# Patient Record
Sex: MALE | Race: WHITE | Employment: OTHER | URBAN - NONMETROPOLITAN AREA
[De-identification: names, ages, dates, MRNs, and addresses within clinical notes are randomized per-mention and may not be internally consistent; named-entity substitution may affect disease eponyms.]

---

## 2021-08-27 ENCOUNTER — HOSPITAL ENCOUNTER (INPATIENT)
Age: 66
LOS: 5 days | Discharge: ANOTHER HOSPITAL | DRG: 253 | End: 2021-09-01
Attending: HOSPITALIST | Admitting: HOSPITALIST
Payer: OTHER GOVERNMENT

## 2021-08-27 ENCOUNTER — APPOINTMENT (OUTPATIENT)
Dept: GENERAL RADIOLOGY | Age: 66
DRG: 253 | End: 2021-08-27
Attending: HOSPITALIST
Payer: OTHER GOVERNMENT

## 2021-08-27 PROBLEM — I99.8 ISCHEMIA OF LEFT UPPER EXTREMITY: Status: ACTIVE | Noted: 2021-08-27

## 2021-08-27 LAB
ABO/RH: NORMAL
ALBUMIN SERPL-MCNC: 3.6 G/DL (ref 3.5–5.2)
ALP BLD-CCNC: 86 U/L (ref 40–130)
ALT SERPL-CCNC: 44 U/L (ref 5–41)
ANION GAP SERPL CALCULATED.3IONS-SCNC: 14 MMOL/L (ref 7–19)
ANTIBODY SCREEN: NORMAL
APTT: 22.7 SEC (ref 26–36.2)
AST SERPL-CCNC: 22 U/L (ref 5–40)
BASOPHILS ABSOLUTE: 0.1 K/UL (ref 0–0.2)
BASOPHILS RELATIVE PERCENT: 0.3 % (ref 0–1)
BILIRUB SERPL-MCNC: 0.7 MG/DL (ref 0.2–1.2)
BUN BLDV-MCNC: 41 MG/DL (ref 8–23)
CALCIUM SERPL-MCNC: 8.6 MG/DL (ref 8.8–10.2)
CHLORIDE BLD-SCNC: 99 MMOL/L (ref 98–111)
CO2: 22 MMOL/L (ref 22–29)
CREAT SERPL-MCNC: 1.4 MG/DL (ref 0.5–1.2)
D DIMER: 0.94 UG/ML FEU (ref 0–0.48)
EOSINOPHILS ABSOLUTE: 0 K/UL (ref 0–0.6)
EOSINOPHILS RELATIVE PERCENT: 0.2 % (ref 0–5)
GFR AFRICAN AMERICAN: >59
GFR NON-AFRICAN AMERICAN: 51
GLUCOSE BLD-MCNC: 147 MG/DL (ref 74–109)
HCT VFR BLD CALC: 51.1 % (ref 42–52)
HEMOGLOBIN: 16.8 G/DL (ref 14–18)
IMMATURE GRANULOCYTES #: 0.3 K/UL
INR BLD: 0.99 (ref 0.88–1.18)
LYMPHOCYTES ABSOLUTE: 1.2 K/UL (ref 1.1–4.5)
LYMPHOCYTES RELATIVE PERCENT: 5.7 % (ref 20–40)
MAGNESIUM: 2.1 MG/DL (ref 1.6–2.4)
MCH RBC QN AUTO: 29.7 PG (ref 27–31)
MCHC RBC AUTO-ENTMCNC: 32.9 G/DL (ref 33–37)
MCV RBC AUTO: 90.3 FL (ref 80–94)
MONOCYTES ABSOLUTE: 1.3 K/UL (ref 0–0.9)
MONOCYTES RELATIVE PERCENT: 6.3 % (ref 0–10)
NEUTROPHILS ABSOLUTE: 18.1 K/UL (ref 1.5–7.5)
NEUTROPHILS RELATIVE PERCENT: 86.1 % (ref 50–65)
OVALOCYTES: ABNORMAL
PDW BLD-RTO: 12.8 % (ref 11.5–14.5)
PHOSPHORUS: 6.5 MG/DL (ref 2.5–4.5)
PLATELET # BLD: 103 K/UL (ref 130–400)
PLATELET SLIDE REVIEW: ABNORMAL
PMV BLD AUTO: 10.5 FL (ref 9.4–12.4)
POTASSIUM SERPL-SCNC: 4.8 MMOL/L (ref 3.5–5)
PROTHROMBIN TIME: 13.3 SEC (ref 12–14.6)
RBC # BLD: 5.66 M/UL (ref 4.7–6.1)
REASON FOR REJECTION: NORMAL
REJECTED TEST: NORMAL
SODIUM BLD-SCNC: 135 MMOL/L (ref 136–145)
TOTAL PROTEIN: 6.3 G/DL (ref 6.6–8.7)
TROPONIN: <0.01 NG/ML (ref 0–0.03)
WBC # BLD: 21 K/UL (ref 4.8–10.8)

## 2021-08-27 PROCEDURE — 85730 THROMBOPLASTIN TIME PARTIAL: CPT

## 2021-08-27 PROCEDURE — 86900 BLOOD TYPING SEROLOGIC ABO: CPT

## 2021-08-27 PROCEDURE — 86850 RBC ANTIBODY SCREEN: CPT

## 2021-08-27 PROCEDURE — 93931 UPPER EXTREMITY STUDY: CPT

## 2021-08-27 PROCEDURE — 71045 X-RAY EXAM CHEST 1 VIEW: CPT

## 2021-08-27 PROCEDURE — 6360000002 HC RX W HCPCS: Performed by: HOSPITALIST

## 2021-08-27 PROCEDURE — 80053 COMPREHEN METABOLIC PANEL: CPT

## 2021-08-27 PROCEDURE — 85379 FIBRIN DEGRADATION QUANT: CPT

## 2021-08-27 PROCEDURE — 99221 1ST HOSP IP/OBS SF/LOW 40: CPT | Performed by: SURGERY

## 2021-08-27 PROCEDURE — 1210000000 HC MED SURG R&B

## 2021-08-27 PROCEDURE — 85610 PROTHROMBIN TIME: CPT

## 2021-08-27 PROCEDURE — 85025 COMPLETE CBC W/AUTO DIFF WBC: CPT

## 2021-08-27 PROCEDURE — 2580000003 HC RX 258: Performed by: HOSPITALIST

## 2021-08-27 PROCEDURE — 36415 COLL VENOUS BLD VENIPUNCTURE: CPT

## 2021-08-27 PROCEDURE — 83735 ASSAY OF MAGNESIUM: CPT

## 2021-08-27 PROCEDURE — 86901 BLOOD TYPING SEROLOGIC RH(D): CPT

## 2021-08-27 PROCEDURE — 84100 ASSAY OF PHOSPHORUS: CPT

## 2021-08-27 PROCEDURE — 84484 ASSAY OF TROPONIN QUANT: CPT

## 2021-08-27 RX ORDER — CALCIUM CARBONATE 200(500)MG
500 TABLET,CHEWABLE ORAL 3 TIMES DAILY PRN
Status: DISCONTINUED | OUTPATIENT
Start: 2021-08-27 | End: 2021-09-01 | Stop reason: HOSPADM

## 2021-08-27 RX ORDER — SODIUM CHLORIDE 0.9 % (FLUSH) 0.9 %
5-40 SYRINGE (ML) INJECTION PRN
Status: DISCONTINUED | OUTPATIENT
Start: 2021-08-27 | End: 2021-08-30 | Stop reason: SDUPTHER

## 2021-08-27 RX ORDER — POLYETHYLENE GLYCOL 3350 17 G/17G
17 POWDER, FOR SOLUTION ORAL DAILY PRN
Status: DISCONTINUED | OUTPATIENT
Start: 2021-08-27 | End: 2021-08-31

## 2021-08-27 RX ORDER — SODIUM CHLORIDE 9 MG/ML
25 INJECTION, SOLUTION INTRAVENOUS PRN
Status: DISCONTINUED | OUTPATIENT
Start: 2021-08-27 | End: 2021-08-30 | Stop reason: SDUPTHER

## 2021-08-27 RX ORDER — NAPROXEN 375 MG/1
375 TABLET ORAL 2 TIMES DAILY WITH MEALS
COMMUNITY

## 2021-08-27 RX ORDER — SODIUM CHLORIDE 0.9 % (FLUSH) 0.9 %
5-40 SYRINGE (ML) INJECTION EVERY 12 HOURS SCHEDULED
Status: DISCONTINUED | OUTPATIENT
Start: 2021-08-27 | End: 2021-08-30 | Stop reason: SDUPTHER

## 2021-08-27 RX ORDER — HEPARIN SODIUM 1000 [USP'U]/ML
4000 INJECTION, SOLUTION INTRAVENOUS; SUBCUTANEOUS PRN
Status: DISCONTINUED | OUTPATIENT
Start: 2021-08-27 | End: 2021-08-28

## 2021-08-27 RX ORDER — HEPARIN SODIUM 1000 [USP'U]/ML
2000 INJECTION, SOLUTION INTRAVENOUS; SUBCUTANEOUS PRN
Status: DISCONTINUED | OUTPATIENT
Start: 2021-08-27 | End: 2021-08-28

## 2021-08-27 RX ORDER — OMEPRAZOLE 20 MG/1
20 CAPSULE, DELAYED RELEASE ORAL DAILY
COMMUNITY

## 2021-08-27 RX ORDER — HYDROCHLOROTHIAZIDE 25 MG/1
25 TABLET ORAL DAILY
COMMUNITY

## 2021-08-27 RX ORDER — LISINOPRIL 20 MG/1
20 TABLET ORAL DAILY
COMMUNITY

## 2021-08-27 RX ORDER — MECOBALAMIN 5000 MCG
5 TABLET,DISINTEGRATING ORAL NIGHTLY PRN
Status: DISCONTINUED | OUTPATIENT
Start: 2021-08-27 | End: 2021-09-01 | Stop reason: HOSPADM

## 2021-08-27 RX ORDER — MIRTAZAPINE 15 MG/1
45 TABLET, FILM COATED ORAL NIGHTLY
COMMUNITY

## 2021-08-27 RX ORDER — NALOXONE HYDROCHLORIDE 0.4 MG/ML
0.4 INJECTION, SOLUTION INTRAMUSCULAR; INTRAVENOUS; SUBCUTANEOUS PRN
Status: DISCONTINUED | OUTPATIENT
Start: 2021-08-27 | End: 2021-09-01 | Stop reason: HOSPADM

## 2021-08-27 RX ORDER — ONDANSETRON 2 MG/ML
4 INJECTION INTRAMUSCULAR; INTRAVENOUS EVERY 6 HOURS PRN
Status: DISCONTINUED | OUTPATIENT
Start: 2021-08-27 | End: 2021-09-01 | Stop reason: HOSPADM

## 2021-08-27 RX ORDER — ONDANSETRON 4 MG/1
4 TABLET, ORALLY DISINTEGRATING ORAL EVERY 8 HOURS PRN
Status: DISCONTINUED | OUTPATIENT
Start: 2021-08-27 | End: 2021-09-01 | Stop reason: HOSPADM

## 2021-08-27 RX ORDER — HEPARIN SODIUM 10000 [USP'U]/100ML
5-30 INJECTION, SOLUTION INTRAVENOUS CONTINUOUS
Status: DISCONTINUED | OUTPATIENT
Start: 2021-08-27 | End: 2021-08-28

## 2021-08-27 RX ADMIN — SODIUM CHLORIDE, PRESERVATIVE FREE 10 ML: 5 INJECTION INTRAVENOUS at 21:56

## 2021-08-27 RX ADMIN — HEPARIN SODIUM 4000 UNITS: 1000 INJECTION INTRAVENOUS; SUBCUTANEOUS at 21:54

## 2021-08-27 RX ADMIN — HEPARIN SODIUM 9.77 UNITS/KG/HR: 10000 INJECTION, SOLUTION INTRAVENOUS at 21:53

## 2021-08-27 ASSESSMENT — PAIN SCALES - GENERAL: PAINLEVEL_OUTOF10: 0

## 2021-08-28 ENCOUNTER — ANESTHESIA (OUTPATIENT)
Dept: OPERATING ROOM | Age: 66
DRG: 253 | End: 2021-08-28
Payer: OTHER GOVERNMENT

## 2021-08-28 ENCOUNTER — APPOINTMENT (OUTPATIENT)
Dept: INTERVENTIONAL RADIOLOGY/VASCULAR | Age: 66
DRG: 253 | End: 2021-08-28
Attending: HOSPITALIST
Payer: OTHER GOVERNMENT

## 2021-08-28 ENCOUNTER — ANESTHESIA EVENT (OUTPATIENT)
Dept: OPERATING ROOM | Age: 66
DRG: 253 | End: 2021-08-28
Payer: OTHER GOVERNMENT

## 2021-08-28 VITALS — SYSTOLIC BLOOD PRESSURE: 128 MMHG | DIASTOLIC BLOOD PRESSURE: 64 MMHG | TEMPERATURE: 98.4 F | OXYGEN SATURATION: 97 %

## 2021-08-28 PROBLEM — I82.602: Status: ACTIVE | Noted: 2021-08-28

## 2021-08-28 PROBLEM — I74.2 ARTERIAL EMBOLISM AND THROMBOSIS OF UPPER EXTREMITY (HCC): Status: ACTIVE | Noted: 2021-08-28

## 2021-08-28 LAB
ANION GAP SERPL CALCULATED.3IONS-SCNC: 13 MMOL/L (ref 7–19)
APTT: 41.5 SEC (ref 26–36.2)
APTT: 50.2 SEC (ref 26–36.2)
APTT: 50.5 SEC (ref 26–36.2)
APTT: 51.7 SEC (ref 26–36.2)
APTT: 54.9 SEC (ref 26–36.2)
BASOPHILS ABSOLUTE: 0.1 K/UL (ref 0–0.2)
BASOPHILS ABSOLUTE: 0.1 K/UL (ref 0–0.2)
BASOPHILS RELATIVE PERCENT: 0.3 % (ref 0–1)
BASOPHILS RELATIVE PERCENT: 0.3 % (ref 0–1)
BUN BLDV-MCNC: 43 MG/DL (ref 8–23)
CALCIUM SERPL-MCNC: 8.6 MG/DL (ref 8.8–10.2)
CHLORIDE BLD-SCNC: 100 MMOL/L (ref 98–111)
CO2: 20 MMOL/L (ref 22–29)
CREAT SERPL-MCNC: 1.4 MG/DL (ref 0.5–1.2)
EOSINOPHILS ABSOLUTE: 0 K/UL (ref 0–0.6)
EOSINOPHILS ABSOLUTE: 0 K/UL (ref 0–0.6)
EOSINOPHILS RELATIVE PERCENT: 0.1 % (ref 0–5)
EOSINOPHILS RELATIVE PERCENT: 0.2 % (ref 0–5)
GFR AFRICAN AMERICAN: >59
GFR NON-AFRICAN AMERICAN: 51
GLUCOSE BLD-MCNC: 222 MG/DL (ref 74–109)
HCT VFR BLD CALC: 47.6 % (ref 42–52)
HCT VFR BLD CALC: 50.8 % (ref 42–52)
HEMOGLOBIN: 15.5 G/DL (ref 14–18)
HEMOGLOBIN: 16.5 G/DL (ref 14–18)
IMMATURE GRANULOCYTES #: 0.3 K/UL
IMMATURE GRANULOCYTES #: 0.3 K/UL
LV EF: 58 %
LVEF MODALITY: NORMAL
LYMPHOCYTES ABSOLUTE: 1 K/UL (ref 1.1–4.5)
LYMPHOCYTES ABSOLUTE: 1.5 K/UL (ref 1.1–4.5)
LYMPHOCYTES RELATIVE PERCENT: 5.7 % (ref 20–40)
LYMPHOCYTES RELATIVE PERCENT: 7.3 % (ref 20–40)
MCH RBC QN AUTO: 29.5 PG (ref 27–31)
MCH RBC QN AUTO: 29.8 PG (ref 27–31)
MCHC RBC AUTO-ENTMCNC: 32.5 G/DL (ref 33–37)
MCHC RBC AUTO-ENTMCNC: 32.6 G/DL (ref 33–37)
MCV RBC AUTO: 90.7 FL (ref 80–94)
MCV RBC AUTO: 91.5 FL (ref 80–94)
MONOCYTES ABSOLUTE: 0.4 K/UL (ref 0–0.9)
MONOCYTES ABSOLUTE: 1.5 K/UL (ref 0–0.9)
MONOCYTES RELATIVE PERCENT: 2.3 % (ref 0–10)
MONOCYTES RELATIVE PERCENT: 7.1 % (ref 0–10)
NEUTROPHILS ABSOLUTE: 16.4 K/UL (ref 1.5–7.5)
NEUTROPHILS ABSOLUTE: 17.1 K/UL (ref 1.5–7.5)
NEUTROPHILS RELATIVE PERCENT: 83.7 % (ref 50–65)
NEUTROPHILS RELATIVE PERCENT: 89.8 % (ref 50–65)
PDW BLD-RTO: 12.8 % (ref 11.5–14.5)
PDW BLD-RTO: 13 % (ref 11.5–14.5)
PLATELET # BLD: 108 K/UL (ref 130–400)
PLATELET # BLD: 97 K/UL (ref 130–400)
PMV BLD AUTO: 10.8 FL (ref 9.4–12.4)
PMV BLD AUTO: 11.1 FL (ref 9.4–12.4)
POTASSIUM REFLEX MAGNESIUM: 4.7 MMOL/L (ref 3.5–5)
RBC # BLD: 5.2 M/UL (ref 4.7–6.1)
RBC # BLD: 5.6 M/UL (ref 4.7–6.1)
SODIUM BLD-SCNC: 133 MMOL/L (ref 136–145)
WBC # BLD: 18.3 K/UL (ref 4.8–10.8)
WBC # BLD: 20.4 K/UL (ref 4.8–10.8)

## 2021-08-28 PROCEDURE — 36415 COLL VENOUS BLD VENIPUNCTURE: CPT

## 2021-08-28 PROCEDURE — 2580000003 HC RX 258: Performed by: SURGERY

## 2021-08-28 PROCEDURE — 3600000017 HC SURGERY HYBRID ADDL 15MIN: Performed by: SURGERY

## 2021-08-28 PROCEDURE — 6360000002 HC RX W HCPCS: Performed by: SURGERY

## 2021-08-28 PROCEDURE — 7100000001 HC PACU RECOVERY - ADDTL 15 MIN: Performed by: SURGERY

## 2021-08-28 PROCEDURE — 99221 1ST HOSP IP/OBS SF/LOW 40: CPT | Performed by: INTERNAL MEDICINE

## 2021-08-28 PROCEDURE — 03CA0ZZ EXTIRPATION OF MATTER FROM LEFT ULNAR ARTERY, OPEN APPROACH: ICD-10-PCS | Performed by: SURGERY

## 2021-08-28 PROCEDURE — 7100000000 HC PACU RECOVERY - FIRST 15 MIN: Performed by: SURGERY

## 2021-08-28 PROCEDURE — 34101 REMOVAL OF ARTERY CLOT: CPT | Performed by: SURGERY

## 2021-08-28 PROCEDURE — 80048 BASIC METABOLIC PNL TOTAL CA: CPT

## 2021-08-28 PROCEDURE — 88304 TISSUE EXAM BY PATHOLOGIST: CPT

## 2021-08-28 PROCEDURE — 03CC0ZZ EXTIRPATION OF MATTER FROM LEFT RADIAL ARTERY, OPEN APPROACH: ICD-10-PCS | Performed by: SURGERY

## 2021-08-28 PROCEDURE — 2700000000 HC OXYGEN THERAPY PER DAY

## 2021-08-28 PROCEDURE — 34111 REMOVAL OF ARM ARTERY CLOT: CPT | Performed by: SURGERY

## 2021-08-28 PROCEDURE — 6360000002 HC RX W HCPCS

## 2021-08-28 PROCEDURE — 2580000003 HC RX 258

## 2021-08-28 PROCEDURE — 3700000001 HC ADD 15 MINUTES (ANESTHESIA): Performed by: SURGERY

## 2021-08-28 PROCEDURE — 03C80ZZ EXTIRPATION OF MATTER FROM LEFT BRACHIAL ARTERY, OPEN APPROACH: ICD-10-PCS | Performed by: SURGERY

## 2021-08-28 PROCEDURE — 99223 1ST HOSP IP/OBS HIGH 75: CPT | Performed by: INTERNAL MEDICINE

## 2021-08-28 PROCEDURE — 6370000000 HC RX 637 (ALT 250 FOR IP): Performed by: SURGERY

## 2021-08-28 PROCEDURE — 2500000003 HC RX 250 WO HCPCS

## 2021-08-28 PROCEDURE — C1757 CATH, THROMBECTOMY/EMBOLECT: HCPCS | Performed by: SURGERY

## 2021-08-28 PROCEDURE — 85025 COMPLETE CBC W/AUTO DIFF WBC: CPT

## 2021-08-28 PROCEDURE — 2709999900 HC NON-CHARGEABLE SUPPLY: Performed by: SURGERY

## 2021-08-28 PROCEDURE — 3600000007 HC SURGERY HYBRID BASE: Performed by: SURGERY

## 2021-08-28 PROCEDURE — 85730 THROMBOPLASTIN TIME PARTIAL: CPT

## 2021-08-28 PROCEDURE — C8929 TTE W OR WO FOL WCON,DOPPLER: HCPCS

## 2021-08-28 PROCEDURE — 3700000000 HC ANESTHESIA ATTENDED CARE: Performed by: SURGERY

## 2021-08-28 PROCEDURE — 1210000000 HC MED SURG R&B

## 2021-08-28 PROCEDURE — C1769 GUIDE WIRE: HCPCS | Performed by: SURGERY

## 2021-08-28 RX ORDER — DEXAMETHASONE SODIUM PHOSPHATE 10 MG/ML
INJECTION, SOLUTION INTRAMUSCULAR; INTRAVENOUS PRN
Status: DISCONTINUED | OUTPATIENT
Start: 2021-08-28 | End: 2021-08-28 | Stop reason: SDUPTHER

## 2021-08-28 RX ORDER — SODIUM CHLORIDE 9 MG/ML
25 INJECTION, SOLUTION INTRAVENOUS PRN
Status: DISCONTINUED | OUTPATIENT
Start: 2021-08-28 | End: 2021-08-30 | Stop reason: SDUPTHER

## 2021-08-28 RX ORDER — SUCCINYLCHOLINE CHLORIDE 20 MG/ML
INJECTION INTRAMUSCULAR; INTRAVENOUS PRN
Status: DISCONTINUED | OUTPATIENT
Start: 2021-08-28 | End: 2021-08-28 | Stop reason: SDUPTHER

## 2021-08-28 RX ORDER — MORPHINE SULFATE 4 MG/ML
4 INJECTION, SOLUTION INTRAMUSCULAR; INTRAVENOUS EVERY 5 MIN PRN
Status: DISCONTINUED | OUTPATIENT
Start: 2021-08-28 | End: 2021-08-28 | Stop reason: HOSPADM

## 2021-08-28 RX ORDER — PROMETHAZINE HYDROCHLORIDE 25 MG/ML
6.25 INJECTION, SOLUTION INTRAMUSCULAR; INTRAVENOUS
Status: DISCONTINUED | OUTPATIENT
Start: 2021-08-28 | End: 2021-08-28 | Stop reason: HOSPADM

## 2021-08-28 RX ORDER — HYDROMORPHONE HYDROCHLORIDE 1 MG/ML
0.25 INJECTION, SOLUTION INTRAMUSCULAR; INTRAVENOUS; SUBCUTANEOUS EVERY 5 MIN PRN
Status: DISCONTINUED | OUTPATIENT
Start: 2021-08-28 | End: 2021-08-28 | Stop reason: HOSPADM

## 2021-08-28 RX ORDER — ROCURONIUM BROMIDE 10 MG/ML
INJECTION, SOLUTION INTRAVENOUS PRN
Status: DISCONTINUED | OUTPATIENT
Start: 2021-08-28 | End: 2021-08-28 | Stop reason: SDUPTHER

## 2021-08-28 RX ORDER — PROPOFOL 10 MG/ML
INJECTION, EMULSION INTRAVENOUS PRN
Status: DISCONTINUED | OUTPATIENT
Start: 2021-08-28 | End: 2021-08-28 | Stop reason: SDUPTHER

## 2021-08-28 RX ORDER — LISINOPRIL 20 MG/1
20 TABLET ORAL DAILY
Status: DISCONTINUED | OUTPATIENT
Start: 2021-08-28 | End: 2021-09-01 | Stop reason: HOSPADM

## 2021-08-28 RX ORDER — MIDAZOLAM HYDROCHLORIDE 1 MG/ML
INJECTION INTRAMUSCULAR; INTRAVENOUS PRN
Status: DISCONTINUED | OUTPATIENT
Start: 2021-08-28 | End: 2021-08-28 | Stop reason: SDUPTHER

## 2021-08-28 RX ORDER — MIRTAZAPINE 15 MG/1
45 TABLET, FILM COATED ORAL NIGHTLY
Status: DISCONTINUED | OUTPATIENT
Start: 2021-08-28 | End: 2021-08-31

## 2021-08-28 RX ORDER — LIDOCAINE HYDROCHLORIDE 10 MG/ML
INJECTION, SOLUTION EPIDURAL; INFILTRATION; INTRACAUDAL; PERINEURAL PRN
Status: DISCONTINUED | OUTPATIENT
Start: 2021-08-28 | End: 2021-08-28 | Stop reason: SDUPTHER

## 2021-08-28 RX ORDER — HYDROCODONE BITARTRATE AND ACETAMINOPHEN 7.5; 325 MG/1; MG/1
1 TABLET ORAL EVERY 4 HOURS PRN
Status: DISCONTINUED | OUTPATIENT
Start: 2021-08-28 | End: 2021-09-01 | Stop reason: HOSPADM

## 2021-08-28 RX ORDER — ONDANSETRON 2 MG/ML
INJECTION INTRAMUSCULAR; INTRAVENOUS PRN
Status: DISCONTINUED | OUTPATIENT
Start: 2021-08-28 | End: 2021-08-28 | Stop reason: SDUPTHER

## 2021-08-28 RX ORDER — HYDROMORPHONE HYDROCHLORIDE 1 MG/ML
0.5 INJECTION, SOLUTION INTRAMUSCULAR; INTRAVENOUS; SUBCUTANEOUS EVERY 5 MIN PRN
Status: DISCONTINUED | OUTPATIENT
Start: 2021-08-28 | End: 2021-08-28 | Stop reason: HOSPADM

## 2021-08-28 RX ORDER — SODIUM CHLORIDE 0.9 % (FLUSH) 0.9 %
5-40 SYRINGE (ML) INJECTION EVERY 12 HOURS SCHEDULED
Status: DISCONTINUED | OUTPATIENT
Start: 2021-08-28 | End: 2021-08-30 | Stop reason: SDUPTHER

## 2021-08-28 RX ORDER — PANTOPRAZOLE SODIUM 40 MG/1
40 TABLET, DELAYED RELEASE ORAL
Status: DISCONTINUED | OUTPATIENT
Start: 2021-08-28 | End: 2021-09-01 | Stop reason: HOSPADM

## 2021-08-28 RX ORDER — LABETALOL HYDROCHLORIDE 5 MG/ML
5 INJECTION, SOLUTION INTRAVENOUS EVERY 10 MIN PRN
Status: DISCONTINUED | OUTPATIENT
Start: 2021-08-28 | End: 2021-08-28 | Stop reason: HOSPADM

## 2021-08-28 RX ORDER — EPHEDRINE SULFATE 50 MG/ML
INJECTION, SOLUTION INTRAVENOUS PRN
Status: DISCONTINUED | OUTPATIENT
Start: 2021-08-28 | End: 2021-08-28 | Stop reason: SDUPTHER

## 2021-08-28 RX ORDER — HYDROCHLOROTHIAZIDE 25 MG/1
25 TABLET ORAL DAILY
Status: DISCONTINUED | OUTPATIENT
Start: 2021-08-28 | End: 2021-09-01

## 2021-08-28 RX ORDER — HYDROMORPHONE HYDROCHLORIDE 1 MG/ML
0.5 INJECTION, SOLUTION INTRAMUSCULAR; INTRAVENOUS; SUBCUTANEOUS EVERY 4 HOURS PRN
Status: DISCONTINUED | OUTPATIENT
Start: 2021-08-28 | End: 2021-08-29

## 2021-08-28 RX ORDER — ENALAPRILAT 2.5 MG/2ML
1.25 INJECTION INTRAVENOUS
Status: DISCONTINUED | OUTPATIENT
Start: 2021-08-28 | End: 2021-08-28 | Stop reason: HOSPADM

## 2021-08-28 RX ORDER — CEFAZOLIN SODIUM 1 G/3ML
INJECTION, POWDER, FOR SOLUTION INTRAMUSCULAR; INTRAVENOUS PRN
Status: DISCONTINUED | OUTPATIENT
Start: 2021-08-28 | End: 2021-08-28 | Stop reason: SDUPTHER

## 2021-08-28 RX ORDER — FENTANYL CITRATE 50 UG/ML
INJECTION, SOLUTION INTRAMUSCULAR; INTRAVENOUS PRN
Status: DISCONTINUED | OUTPATIENT
Start: 2021-08-28 | End: 2021-08-28 | Stop reason: SDUPTHER

## 2021-08-28 RX ORDER — MEPERIDINE HYDROCHLORIDE 25 MG/ML
12.5 INJECTION INTRAMUSCULAR; INTRAVENOUS; SUBCUTANEOUS EVERY 5 MIN PRN
Status: DISCONTINUED | OUTPATIENT
Start: 2021-08-28 | End: 2021-08-28 | Stop reason: HOSPADM

## 2021-08-28 RX ORDER — CALCIUM CHLORIDE 100 MG/ML
INJECTION INTRAVENOUS; INTRAVENTRICULAR PRN
Status: DISCONTINUED | OUTPATIENT
Start: 2021-08-28 | End: 2021-08-28 | Stop reason: SDUPTHER

## 2021-08-28 RX ORDER — METOCLOPRAMIDE HYDROCHLORIDE 5 MG/ML
10 INJECTION INTRAMUSCULAR; INTRAVENOUS
Status: DISCONTINUED | OUTPATIENT
Start: 2021-08-28 | End: 2021-08-28 | Stop reason: HOSPADM

## 2021-08-28 RX ORDER — HYDRALAZINE HYDROCHLORIDE 20 MG/ML
5 INJECTION INTRAMUSCULAR; INTRAVENOUS EVERY 10 MIN PRN
Status: DISCONTINUED | OUTPATIENT
Start: 2021-08-28 | End: 2021-08-28 | Stop reason: HOSPADM

## 2021-08-28 RX ORDER — SODIUM CHLORIDE 9 MG/ML
INJECTION, SOLUTION INTRAVENOUS CONTINUOUS
Status: DISCONTINUED | OUTPATIENT
Start: 2021-08-28 | End: 2021-08-30 | Stop reason: ALTCHOICE

## 2021-08-28 RX ORDER — SODIUM CHLORIDE 0.9 % (FLUSH) 0.9 %
5-40 SYRINGE (ML) INJECTION PRN
Status: DISCONTINUED | OUTPATIENT
Start: 2021-08-28 | End: 2021-08-30 | Stop reason: SDUPTHER

## 2021-08-28 RX ORDER — MORPHINE SULFATE 4 MG/ML
2 INJECTION, SOLUTION INTRAMUSCULAR; INTRAVENOUS EVERY 5 MIN PRN
Status: DISCONTINUED | OUTPATIENT
Start: 2021-08-28 | End: 2021-08-28 | Stop reason: HOSPADM

## 2021-08-28 RX ORDER — SODIUM CHLORIDE, SODIUM LACTATE, POTASSIUM CHLORIDE, CALCIUM CHLORIDE 600; 310; 30; 20 MG/100ML; MG/100ML; MG/100ML; MG/100ML
INJECTION, SOLUTION INTRAVENOUS CONTINUOUS PRN
Status: DISCONTINUED | OUTPATIENT
Start: 2021-08-28 | End: 2021-08-28 | Stop reason: SDUPTHER

## 2021-08-28 RX ORDER — DIPHENHYDRAMINE HYDROCHLORIDE 50 MG/ML
12.5 INJECTION INTRAMUSCULAR; INTRAVENOUS
Status: DISCONTINUED | OUTPATIENT
Start: 2021-08-28 | End: 2021-08-28 | Stop reason: HOSPADM

## 2021-08-28 RX ORDER — HEPARIN SODIUM 1000 [USP'U]/ML
INJECTION, SOLUTION INTRAVENOUS; SUBCUTANEOUS PRN
Status: DISCONTINUED | OUTPATIENT
Start: 2021-08-28 | End: 2021-08-28 | Stop reason: SDUPTHER

## 2021-08-28 RX ADMIN — FENTANYL CITRATE 100 MCG: 50 INJECTION, SOLUTION INTRAMUSCULAR; INTRAVENOUS at 00:43

## 2021-08-28 RX ADMIN — CALCIUM CHLORIDE 0.5 G: 100 INJECTION, SOLUTION INTRAVENOUS; INTRAVENTRICULAR at 01:25

## 2021-08-28 RX ADMIN — ARGATROBAN 2 MCG/KG/MIN: 100 INJECTION, SOLUTION INTRAVENOUS at 08:12

## 2021-08-28 RX ADMIN — CEFAZOLIN SODIUM 2000 MG: 1 INJECTION, POWDER, FOR SOLUTION INTRAMUSCULAR; INTRAVENOUS at 17:04

## 2021-08-28 RX ADMIN — EPHEDRINE SULFATE 15 MG: 50 INJECTION INTRAMUSCULAR; INTRAVENOUS; SUBCUTANEOUS at 01:04

## 2021-08-28 RX ADMIN — MIDAZOLAM 2 MG: 1 INJECTION INTRAMUSCULAR; INTRAVENOUS at 00:42

## 2021-08-28 RX ADMIN — SODIUM CHLORIDE, PRESERVATIVE FREE 10 ML: 5 INJECTION INTRAVENOUS at 08:23

## 2021-08-28 RX ADMIN — SUGAMMADEX 400 MG: 100 INJECTION, SOLUTION INTRAVENOUS at 04:09

## 2021-08-28 RX ADMIN — ROCURONIUM BROMIDE 30 MG: 10 INJECTION, SOLUTION INTRAVENOUS at 02:11

## 2021-08-28 RX ADMIN — SODIUM CHLORIDE: 9 INJECTION, SOLUTION INTRAVENOUS at 06:32

## 2021-08-28 RX ADMIN — HEPARIN SODIUM 5000 UNITS: 1000 INJECTION, SOLUTION INTRAVENOUS; SUBCUTANEOUS at 01:38

## 2021-08-28 RX ADMIN — LIDOCAINE HYDROCHLORIDE 50 MG: 10 INJECTION, SOLUTION EPIDURAL; INFILTRATION; INTRACAUDAL; PERINEURAL at 00:47

## 2021-08-28 RX ADMIN — ONDANSETRON HYDROCHLORIDE 4 MG: 2 INJECTION, SOLUTION INTRAMUSCULAR; INTRAVENOUS at 04:04

## 2021-08-28 RX ADMIN — ROCURONIUM BROMIDE 50 MG: 10 INJECTION, SOLUTION INTRAVENOUS at 01:02

## 2021-08-28 RX ADMIN — EPHEDRINE SULFATE 20 MG: 50 INJECTION INTRAMUSCULAR; INTRAVENOUS; SUBCUTANEOUS at 03:46

## 2021-08-28 RX ADMIN — CEFAZOLIN SODIUM 2000 MG: 1 INJECTION, POWDER, FOR SOLUTION INTRAMUSCULAR; INTRAVENOUS at 01:00

## 2021-08-28 RX ADMIN — HYDROCHLOROTHIAZIDE 25 MG: 25 TABLET ORAL at 08:12

## 2021-08-28 RX ADMIN — DEXAMETHASONE SODIUM PHOSPHATE 10 MG: 10 INJECTION, SOLUTION INTRAMUSCULAR; INTRAVENOUS at 01:02

## 2021-08-28 RX ADMIN — CEFAZOLIN SODIUM 2000 MG: 1 INJECTION, POWDER, FOR SOLUTION INTRAMUSCULAR; INTRAVENOUS at 08:11

## 2021-08-28 RX ADMIN — PANTOPRAZOLE SODIUM 40 MG: 40 TABLET, DELAYED RELEASE ORAL at 06:32

## 2021-08-28 RX ADMIN — PROPOFOL 160 MG: 10 INJECTION, EMULSION INTRAVENOUS at 00:47

## 2021-08-28 RX ADMIN — HEPARIN SODIUM 1000 UNITS: 1000 INJECTION, SOLUTION INTRAVENOUS; SUBCUTANEOUS at 02:49

## 2021-08-28 RX ADMIN — SODIUM CHLORIDE, SODIUM LACTATE, POTASSIUM CHLORIDE, AND CALCIUM CHLORIDE: 600; 310; 30; 20 INJECTION, SOLUTION INTRAVENOUS at 00:42

## 2021-08-28 RX ADMIN — MIRTAZAPINE 45 MG: 15 TABLET, FILM COATED ORAL at 21:14

## 2021-08-28 RX ADMIN — FENTANYL CITRATE 50 MCG: 50 INJECTION, SOLUTION INTRAMUSCULAR; INTRAVENOUS at 04:14

## 2021-08-28 RX ADMIN — CALCIUM CHLORIDE 0.5 G: 100 INJECTION, SOLUTION INTRAVENOUS; INTRAVENTRICULAR at 00:55

## 2021-08-28 RX ADMIN — SUCCINYLCHOLINE CHLORIDE 140 MG: 20 INJECTION, SOLUTION INTRAMUSCULAR; INTRAVENOUS at 00:47

## 2021-08-28 RX ADMIN — SODIUM CHLORIDE, PRESERVATIVE FREE 10 ML: 5 INJECTION INTRAVENOUS at 08:24

## 2021-08-28 RX ADMIN — ROCURONIUM BROMIDE 20 MG: 10 INJECTION, SOLUTION INTRAVENOUS at 02:51

## 2021-08-28 RX ADMIN — Medication 5 MG: at 21:14

## 2021-08-28 RX ADMIN — LISINOPRIL 20 MG: 20 TABLET ORAL at 08:12

## 2021-08-28 RX ADMIN — FENTANYL CITRATE 100 MCG: 50 INJECTION, SOLUTION INTRAMUSCULAR; INTRAVENOUS at 04:32

## 2021-08-28 RX ADMIN — SODIUM CHLORIDE, SODIUM LACTATE, POTASSIUM CHLORIDE, AND CALCIUM CHLORIDE: 600; 310; 30; 20 INJECTION, SOLUTION INTRAVENOUS at 02:05

## 2021-08-28 ASSESSMENT — ENCOUNTER SYMPTOMS
ABDOMINAL PAIN: 0
RESPIRATORY NEGATIVE: 1
VOMITING: 0
CONSTIPATION: 0
EYES NEGATIVE: 1
DIARRHEA: 1
NAUSEA: 1
COUGH: 0
ALLERGIC/IMMUNOLOGIC NEGATIVE: 1
GASTROINTESTINAL NEGATIVE: 1

## 2021-08-28 ASSESSMENT — PAIN SCALES - GENERAL
PAINLEVEL_OUTOF10: 0
PAINLEVEL_OUTOF10: 2

## 2021-08-28 NOTE — ANESTHESIA PRE PROCEDURE
Department of Anesthesiology  Preprocedure Note       Name:  Concetta Goldstein   Age:  61 y.o.  :  1955                                          MRN:  [id number]         Date:  2021      Surgeon: Mesha Olivares):  Herminia Espinoza DO    Procedure: Procedure(s):  WOUND EXPLORATION AND/OR REVISION    Medications prior to admission:   Prior to Admission medications    Medication Sig Start Date End Date Taking?  Authorizing Provider   lisinopril (PRINIVIL;ZESTRIL) 20 MG tablet Take 20 mg by mouth daily   Yes Historical Provider, MD   mirtazapine (REMERON) 15 MG tablet Take 45 mg by mouth nightly   Yes Historical Provider, MD   naproxen (NAPROSYN) 375 MG tablet Take 375 mg by mouth 2 times daily (with meals)   Yes Historical Provider, MD   omeprazole (PRILOSEC) 20 MG delayed release capsule Take 20 mg by mouth daily   Yes Historical Provider, MD   hydroCHLOROthiazide (HYDRODIURIL) 25 MG tablet Take 25 mg by mouth daily   Yes Historical Provider, MD       Current medications:    Current Facility-Administered Medications   Medication Dose Route Frequency Provider Last Rate Last Admin    meperidine (DEMEROL) injection 12.5 mg  12.5 mg IntraVENous Q5 Min PRN Nell Danas, APRN - CRNA        HYDROmorphone HCl PF (DILAUDID) injection 0.25 mg  0.25 mg IntraVENous Q5 Min PRN Nell Danas, APRN - CRNA        HYDROmorphone HCl PF (DILAUDID) injection 0.5 mg  0.5 mg IntraVENous Q5 Min PRN Nell Danas, APRN - CRNA        morphine injection 2 mg  2 mg IntraVENous Q5 Min PRN Nell Danas, APRN - CRNA        morphine injection 4 mg  4 mg IntraVENous Q5 Min PRN Nell Danas, APRN - CRNA        promethazine (PHENERGAN) injection 6.25 mg  6.25 mg IntraVENous Once PRN Nell Danas, APRN - CRNA        metoclopramide (REGLAN) injection 10 mg  10 mg IntraVENous Once PRN Nell Danas, APRN - CRNA        diphenhydrAMINE (BENADRYL) injection 12.5 mg  12.5 mg IntraVENous Once PRN Nell Danas, APRN - CRNA        labetalol (NORMODYNE;TRANDATE) injection 5 mg  5 mg IntraVENous Q10 Min PRN Yogesh Govern, APRN - CRNA        hydrALAZINE (APRESOLINE) injection 5 mg  5 mg IntraVENous Q10 Min PRN Yogesh Govern, APRN - CRNA        enalaprilat (VASOTEC) injection 1.25 mg  1.25 mg IntraVENous Once PRN Yogesh Govern, APRN - CRNA        heparin (porcine) injection 4,000 Units  4,000 Units IntraVENous PRN Ofelia Mackey MD   4,000 Units at 08/27/21 2154    heparin (porcine) injection 2,000 Units  2,000 Units IntraVENous PRN Ofelia Mackey MD        heparin 25,000 units in dextrose 5% 250 mL (premix) infusion  5-30 Units/kg/hr IntraVENous Continuous Ofelia Mackey MD 10 mL/hr at 08/27/21 2153 9.77 Units/kg/hr at 08/27/21 2153    calcium carbonate (TUMS) chewable tablet 500 mg  500 mg Oral TID PRN Ofelia Mackey MD        polyethylene glycol [facility]) packet 17 g  17 g Oral Daily PRN Ofelia Mackey MD        melatonin disintegrating tablet 5 mg  5 mg Oral Nightly PRN Ofelia Mackey MD        [facility]) injection 0.4 mg  0.4 mg IntraVENous PRN Ofelia Mackey MD        sodium chloride flush 0.9 % injection 5-40 mL  5-40 mL IntraVENous 2 times per day Ofelia Mackey MD   10 mL at 08/27/21 2156    sodium chloride flush 0.9 % injection 5-40 mL  5-40 mL IntraVENous PRN Ofelia Mackey MD        0.9 % sodium chloride infusion  25 mL IntraVENous PRN Ofelia Mackey MD        ondansetron (ZOFRAN-ODT) disintegrating tablet 4 mg  4 mg Oral Q8H PRN Ofelia Mackey MD        Or    ondansetron [facility]) injection 4 mg  4 mg IntraVENous Q6H PRN Ofelia Mackey MD           Allergies:  No Known Allergies    Problem List:    Patient Active Problem List   Diagnosis Code    Hypertension I10    Ischemia of left upper extremity I99.8    GERD (gastroesophageal reflux disease), as per medication reconciliation K21.9    Black lung ([address] J60    History of tobacco abuse Z87.891       Past Medical History:        Diagnosis Date    [facility] [address] ([facility] [address]     GERD (gastroesophageal reflux disease), as per medication reconciliation     History of tobacco abuse     Hypertension        Past Surgical History:        Procedure Laterality Date    NECK SURGERY Left     \"years ago\" a per report on        Social History:    Social History     Tobacco Use    Smoking status: Former Smoker     Packs/day: 1.00     Years: 12.00     Pack years: 12.00     Types: Cigarettes     Quit date:      Years since quittin.6    Smokeless tobacco: Never Used    Tobacco comment: smoked from age [age], smoked at 1 [facility], tried 2 cigars only in his life   Substance Use Topics    Alcohol use: Yes     Comment: has about a 12 pack per year                                Counseling given: Not Answered  Comment: smoked from age [age], smoked at 1 [facility], tried 2 cigars only in his life      Vital Signs (Current):   Vitals:    21 2112 21 2353   BP: 139/87 122/79   Pulse: 89 65   Resp: 16 16   Temp: 96.8 °F (36 °C) 96.9 °F (36.1 °C)   TempSrc: Temporal Temporal   SpO2: 96% 99%   Weight: 225 lb 12.8 oz (102.4 kg)    Height: 5' 8\" (1.727 m)                                               BP Readings from Last 3 Encounters:   21 122/79       NPO Status:                                                                                 BMI:   Wt Readings from Last 3 Encounters:   21 225 lb 12.8 oz (102.4 kg)     Body mass index is 34.33 kg/m².     CBC:   Lab Results   Component Value Date    WBC 21.0 2021    RBC 5.66 2021    HGB 16.8 2021    HCT 51.1 2021    MCV 90.3 2021    RDW 12.8 2021     2021       CMP:   Lab Results   Component Value Date     2021    K 4.8 2021    CL 99 2021    CO2 22 2021    BUN 41 2021    CREATININE 1.4 2021    GFRAA >59 2021    LABGLOM 51 2021    GLUCOSE 147 2021    PROT 6.3 2021    CALCIUM 8.6 2021    BILITOT 0.7 2021 ALKPHOS 86 08/27/2021    AST 22 08/27/2021    ALT 44 08/27/2021       POC Tests: No results for input(s): POCGLU, POCNA, POCK, POCCL, POCBUN, POCHEMO, POCHCT in the last 72 hours. Coags:   Lab Results   Component Value Date    PROTIME 13.3 08/27/2021    INR 0.99 08/27/2021    APTT 22.7 08/27/2021       HCG (If Applicable): No results found for: PREGTESTUR, PREGSERUM, HCG, HCGQUANT     ABGs: No results found for: PHART, PO2ART, GAG2MTE, SYA4HOA, BEART, R1RXVBUF     Type & Screen (If Applicable):  No results found for: LABABO, LABRH    Drug/Infectious Status (If Applicable):  No results found for: HIV, HEPCAB    COVID-19 Screening (If Applicable): No results found for: COVID19        Anesthesia Evaluation  Patient summary reviewed and Nursing notes reviewed  Airway: Mallampati: II  TM distance: >3 FB   Neck ROM: full  Mouth opening: > = 3 FB Dental:    (+) lower dentures      Pulmonary:                             ROS comment: Hx Black Lung  Hx Smoking - quit 1/1/90   Cardiovascular:  Exercise tolerance: poor (<4 METS),   (+) hypertension: moderate,          Beta Blocker:  Not on Beta Blocker         Neuro/Psych:   Negative Neuro/Psych ROS              GI/Hepatic/Renal:   (+) GERD: well controlled,           Endo/Other: Negative Endo/Other ROS             Pt had no PAT visit       Abdominal:             Vascular: negative vascular ROS. Other Findings: Edentulous on top          Anesthesia Plan      general     ASA 3 - emergent       Induction: intravenous. Anesthetic plan and risks discussed with patient. Use of blood products discussed with patient whom.                    SANKET Rosales - CRNA   8/28/2021

## 2021-08-28 NOTE — PROGRESS NOTES
ptt 54.9, no change to argatroban gtt per protocol. Will continue to monitor.  Electronically signed by Rob Zimmerman RN on 8/28/2021 at 1:35 PM

## 2021-08-28 NOTE — PROGRESS NOTES
Vascular Preliminary Report      Left Upper Extremity Arterial Duplex Completed. The left brachial artery bifurcates around zone 2 and occludes just proximal to the bifurcation. Dr. Royer Wilson is aware. Final Report Pending.

## 2021-08-28 NOTE — PROGRESS NOTES
ptt 51.9, no change to argatroban gtt per protocol. Q2 PTTs now completed due to 3 consecutive ptt's within range. PTT draws now changed to every 8 hours per argatroban orders/protocol. Will continue to monitor.  Electronically signed by Candelario Hampton RN on 8/28/2021 at 4:09 PM

## 2021-08-28 NOTE — OP NOTE
Operative Note      Patient: Bosie Epley  YOB: 1955  MRN: [id number]    Date of Procedure: 8/28/2021    Pre-Op Diagnosis: acute ischemia of left upper extremity    Post-Op Diagnosis: Same       Procedure(s):  EXPLORATION LEFT UPPER EXTREMITY WITH THROMBECTOMY OF BRACHIAL, ULNAR AND RADIAL ARTERIES  Completion angiogram of left upper extremity    Surgeon(s):  Asha Coello DO    Assistant:   * No surgical staff found *    Anesthesia: General    Estimated Blood Loss (mL): 900    Complications: None    Specimens:   ID Type Source Tests Collected by Time Destination   A : LEFT ARM THROMBUS Tissue Arm [facility]  8/28/2021 0143        Implants:  * No implants in log *      Drains: * No LDAs found *    Findings: thrombus occlusion of brachial artery. High bifurcation of the brachial artery into ulnar, radial and interosseus at the same level. Thrombus occlusion of radial artery that required additional midforearm radial artery exposure. light fatty appearing thrombus with unknown etiology. Send for pathology.       Detailed Description of Procedure:   Patient was brought to the operating room under emergent conditions for left upper extremity ischemia and placed in supine position. He received preoperative antibiotics and he was on heparin drip. Preoperative ultrasound was done and showed occlusion of the brachial artery with high bifurcation at the upper arm. Timeout was performed. Left upper arm incision was made in the vertical fashion at the medial side of the biceps in the bicipital groove with 15 blade. It was then carried down electrocautery. Using Metzenbaum scissors the brachial artery was dissected. Median nerve was encountered and protected. It was dissected proximal to high bifurcation and all branches were taken Vesseloops which was in reality trifurcation into the ulnar, radial and possible interosseous branch.   Patient was given 5000 is of heparin. Using a 15 blade transverse arteriotomy was created and extended a little bit distally and proximally with Sales scissors. Upon entering of the blood vessel with fatty looking clot that was pulled using pickups. Then #3 Rosendo was passed proximally and again same content clot was removed proximal until good forward bleeding. This clot was sent to pathology. Then Rosendo was advanced to 2 branches with the same amount of in same consistency of the clot removed. All of this and the pathology. Backbleeding was encountered. I tried to advance my Rosendo into the a branch that was at the 90 degree takeoff, however successful and probably small branch. Then using tibial tip in the contrast approximately angiogram was performed showing the occlusion at about proximal to mid forearm of the radial artery. There is some ulnar flow was noted. The passing Rosendo did not have any more accomplishment. We then closed the arteriotomy using 7-0 interrupted stitches and additional procedures as needed after flushing technique. We then decided to use butterfly just proximal to the suture of the arteriotomy and performed another look at the upper extremity flow. It is basically the same picture. We then closed that butterfly access site using 7-0 Prolene stitch. We decided to perform cutdown on the mid forearm radial artery to remove as much clot as we could. We thought that the clot consistency just would not let the Rosendo pass beyond that point because is not smooth clot as it kept leaking containing granular clot. So the cutdown of the mid forearm on the radial artery site was performed using 15 blade, it was carried down with electrocautery through subcutaneous tissue. Using Metzenbaum scissors we dissected radial artery. It was difficult dissection as there is no pulse.   We then extended from the proximal incision where I was still pulsatile artery and were able to find it in check it with distal

## 2021-08-28 NOTE — CONSULTS
Vascular Surgery Consultation    Chief complaint - Patient presenter to a hospital at [facility] for left upper extremity coolness and discoloration  56-year-old male presented to the outside hospital for coolness and weakness left upper extremity with finger discoloration. He was and had a CTA suggestive of brachial artery occlusion. He was transferred to our hospital for vascular evaluation and treatment. Patient had the onset of symptoms today 30 minutes before presented to the outside hospital.  On arrival to our institution his left upper extremity was not getting worse. It was motor and sensory intact. He admits to some weakness in heavy use of the arm. Patient denies any procedures with the left upper extremity access, denies chest pain or irregular heart rate. He had on and off tingling in upper extremity. No previous episodes of coolness in the upper extremities or lower extremities. On arrival to our facility he was drawn another set of labs including baseline PTT and INR and was started on heparin drip.     Sharon Kapoor is a 61 y.o. male with the following history reviewed and recorded in [employer]:  Patient Active Problem List    Diagnosis Date Noted    Ischemia of left upper extremity 08/27/2021    Hypertension      Current Facility-Administered Medications   Medication Dose Route Frequency Provider Last Rate Last Admin    heparin (porcine) injection 4,000 Units  4,000 Units IntraVENous PRN Edna Jean-Baptiste MD   4,000 Units at 08/27/21 2154    heparin (porcine) injection 2,000 Units  2,000 Units IntraVENous PRN Edna Jean-Baptiste MD        heparin 25,000 units in dextrose 5% 250 mL (premix) infusion  5-30 Units/kg/hr IntraVENous Continuous Edna Jean-Baptiste MD 10 mL/hr at 08/27/21 2153 9.77 Units/kg/hr at 08/27/21 2153    calcium carbonate (TUMS) chewable tablet 500 mg  500 mg Oral TID PRN Edna Jean-Baptiste MD        polyethylene glycol (GLYCOLAX) packet 17 g  17 g Oral Daily PRN Blaine Jacobs MD        melatonin disintegrating tablet 5 mg  5 mg Oral Nightly PRN Blaine Jacobs MD        naloxone [facility]) injection 0.4 mg  0.4 mg IntraVENous PRN Blaine Jacobs MD        sodium chloride flush 0.9 % injection 5-40 mL  5-40 mL IntraVENous 2 times per day Blaine Jacobs MD   10 mL at 08/27/21 2156    sodium chloride flush 0.9 % injection 5-40 mL  5-40 mL IntraVENous PRN Blaine Jacobs MD        0.9 % sodium chloride infusion  25 mL IntraVENous PRN Blaine Jacobs MD        ondansetron (ZOFRAN-ODT) disintegrating tablet 4 mg  4 mg Oral Q8H PRN Blaine Jacobs MD        Or    ondansetron [facility]) injection 4 mg  4 mg IntraVENous Q6H PRN Blaine Jacobs MD         Allergies: Patient has no known allergies. Past Medical History:   Diagnosis Date    GERD (gastroesophageal reflux disease), as per medication reconciliation     Hypertension      Past Surgical History:   Procedure Laterality Date    NECK SURGERY Left     \"years ago\" a per report on [id number]     No family history on file. Social History     Tobacco Use    Smoking status: Never Smoker    Smokeless tobacco: Never Used   Substance Use Topics    Alcohol use: Not on file         Review of Systems    Constitutional - no significant activity change, appetite change, or unexpected weight change. No fever or chills. No diaphoresis or significant fatigue. HENT - no significant rhinorrhea or epistaxis. No tinnitus or significant hearing loss. Eyes - no sudden vision change or amaurosis. Respiratory - no significant shortness of breath, +wheezing, patient had \"black lung from coal mining  \", no stridor. No apnea, cough, or chest tightness associated with shortness of breath. Cardiovascular - no chest pain, syncope, or significant dizziness. No palpitations or significant leg swelling. No claudication. Gastrointestinal - has not had abdominal swelling or pain. No blood in stool.   No severe constipation, diarrhea, nausea, or vomiting. Genitourinary - No difficulty urinating, dysuria, frequency, or urgency. No flank pain or hematuria. Musculoskeletal - has had left sciatica pain, no gait disturbance, or myalgia. Skin - no color change, rash, pallor,  or new wound. Neurologic - no dizziness, facial asymmetry, or light headedness. No seizures. No speech difficulty or lateralizing weakness. Hematologic - no easy bruising or excessive bleeding. Psychiatric - no severe anxiety or nervousness. No confusion. All other review of systems are negative. Physical Exam    /87   Pulse 89   Temp 96.8 °F (36 °C) (Temporal)   Resp 16   Ht 5' 8\" (1.727 m)   Wt 225 lb 12.8 oz (102.4 kg)   SpO2 96%   BMI 34.33 kg/m²     Constitutional - well developed, well nourished. No diaphoresis or acute distress. HENT - head normocephalic. Eyes - conjunctiva normal.    No icterus. Neck- ROM appears normal, no tracheal deviation. Cardiovascular - Regular rate and rhythm. Heart sounds are normal.  No murmur, rub, or gallop. Carotid pulses are 2+ to palpation bilaterally without bruit. Extremities - Radial and brachial pulses are 2+ to palpation bilaterally. Right DP: present 2+; Right PT present 2+;  Left DP: present 2+; Left PT: present 2+    + Cyanosis of left distal palm and fingers, no clubbing, or significant edema. No signs atheroembolic event. Pulmonary - effort appears normal.    No respiratory distress. Lungs - Breath sounds normal.  Some wheezes, no rales. GI - Abdomen - soft, non tender, bowel sounds X 4 quadrants. No guarding or rebound tenderness. No distension or palpable mass. Genitourinary - deferred. Musculoskeletal - ROM appears normal.  No significant edema. Neurologic - alert and oriented X 3. Physiologic. Skin - warm, dry, and intact. No rash, erythema, or pallor.    Psychiatric - mood, affect, and behavior appear normal.  Judgment and thought processes appear

## 2021-08-28 NOTE — BRIEF OP NOTE
Brief Postoperative Note      Patient: Tj Villegas  YOB: 1955  MRN: [id number]    Date of Procedure: 8/28/2021    Pre-Op Diagnosis: acute ischemia of left upper extremity    Post-Op Diagnosis: Same       Procedure(s):  EXPLORATION LEFT UPPER EXTREMITY WITH THROMBECTOMY OF BRACHIAL, ULNAR AND RADIAL ARTERIES  Completion angiogram of left upper extremity    Surgeon(s):  Antonette Farooq DO    Assistant:  * No surgical staff found *    Anesthesia: General    Estimated Blood Loss (mL): 920    Complications: None    Specimens:   ID Type Source Tests Collected by Time Destination   A : LEFT [facility]BUS [facility]143        Implants:  * No implants in log *      Drains: * No LDAs found *    Findings: thrombus occlusion of brachial artery. High bifurcation of the brachial artery into ulnar, radial and interosseus at the same level. Thrombus occlusion of radial artery that required additional midforearm radial artery exposure. light fatty appearing thrombus with unknown etiology. Send for pathology.     Electronically signed by Antonette Farooq DO on 8/28/2021 at 4:13 AM

## 2021-08-28 NOTE — PROGRESS NOTES
Ptt 50.5, no change per protocol. Will continue to monitor.  Electronically signed by Moises Paul RN on 8/28/2021 at 11:11 AM

## 2021-08-28 NOTE — PLAN OF CARE
Problem: Falls - Risk of:  Goal: Will remain free from falls  Description: Will remain free from falls  8/28/2021 0424 by Lidya Check RN  Outcome: Ongoing  8/28/2021 0418 by Lidya Jin, RN  Outcome: Ongoing  Goal: Absence of physical injury  Description: Absence of physical injury  8/28/2021 0424 by Lidya Jin RN  Outcome: Ongoing  8/28/2021 0418 by Lidya Jin RN  Outcome: Ongoing

## 2021-08-28 NOTE — CONSULTS
History:  Past Surgical History:   Procedure Laterality Date    NECK SURGERY Left     \"years ago\" a per report on [id number]       Past Family History:  Family History   Problem Relation Age of Onset    Heart Disease Mother     Other Mother         tobacco    Heart Disease Father     Other Father         tobacco    Cancer Father     Diabetes Sister     Heart Disease Sister     Other Sister         smoker    Dementia Sister     COPD Sister         smoker    COPD Sister         smoker    Cancer Sister     COPD Sister         heavy smoker    Drug Abuse Brother     No Known Problems Daughter     Heart Disease Daughter         congenital from the Mother's side       Past Social History:  Social History     Socioeconomic History    Marital status:      Spouse name: Mrs. Mercy Montero    Number of children: 2    Years of education: Not on file    Highest education level: Not on file   Occupational History    Occupation:      Comment: disabled , worked 32.5 years   Tobacco Use    Smoking status: Former Smoker     Packs/day: 1.00     Years: 12.00     Pack years: 12.00     Types: Cigarettes     Quit date:      Years since quittin.6    Smokeless tobacco: Never Used    Tobacco comment: smoked from age [age], smoked at 1 [employer], tried 2 cigars only in his life   Vaping Use    Vaping Use: Never used   Substance and Sexual Activity    Alcohol use: Yes     Comment: has about a 12 pack per year    Drug use: Not Currently     Types: Marijuana     Comment: stopped in  or  when they started drug testing    Sexual activity: Not on file     Comment: 2 daughters   Other Topics Concern    Not on file   Social History Narrative    CODE STATUS: Full Code    HEALTH CARE PROXY: Mrs. Mercy Montero, his wife, [phone]    AMBULATES: independently normally    DOMICILED: lives in a private home, lives alone with his wife, has a yorkie, no stairs in the home     Social Determinants of Health     Financial Resource Strain:     Difficulty of Paying Living Expenses:    Food Insecurity:     Worried About Running Out of Food in the Last Year:     [address] in the Last Year:    Transportation Needs:     Lack of Transportation (Medical):  Lack of Transportation (Non-Medical):    Physical Activity:     Days of Exercise per Week:     Minutes of Exercise per Session:    Stress:     Feeling of Stress :    Social Connections:     Frequency of Communication with Friends and Family:     Frequency of Social Gatherings with Friends and Family:     Attends Mu-ism Services:     Active Member of Clubs or Organizations:     Attends Club or Organization Meetings:     Marital Status:    Intimate Partner Violence:     Fear of Current or Ex-Partner:     Emotionally Abused:     Physically Abused:     Sexually Abused: Allergies:  No Known Allergies    Home Meds:  Prior to Admission medications    Medication Sig Start Date End Date Taking?  Authorizing Provider   lisinopril (PRINIVIL;ZESTRIL) 20 MG tablet Take 20 mg by mouth daily   Yes Historical Provider, MD   mirtazapine (REMERON) 15 MG tablet Take 45 mg by mouth nightly   Yes Historical Provider, MD   naproxen (NAPROSYN) 375 MG tablet Take 375 mg by mouth 2 times daily (with meals)   Yes Historical Provider, MD   omeprazole (PRILOSEC) 20 MG delayed release capsule Take 20 mg by mouth daily   Yes Historical Provider, MD   hydroCHLOROthiazide (HYDRODIURIL) 25 MG tablet Take 25 mg by mouth daily   Yes Historical Provider, MD       Current Meds:   hydroCHLOROthiazide  25 mg Oral Daily    lisinopril  20 mg Oral Daily    mirtazapine  45 mg Oral Nightly    pantoprazole  40 mg Oral QAM AC    sodium chloride flush  5-40 mL IntraVENous 2 times per day    ceFAZolin (ANCEF) IVPB  2,000 mg IntraVENous Q8H    sodium chloride flush  5-40 mL IntraVENous 2 times per day       Current Infused Meds:   sodium chloride 100 mL/hr at 08/28/21 1021    sodium chloride      argatroban infusion 250 mg in 250 mL 2 mcg/kg/min (08/28/21 1110)    sodium chloride         Physical Exam:  Vitals:    08/28/21 1141   BP: 106/65   Pulse: 87   Resp: 17   Temp: 97.7 °F (36.5 °C)   SpO2: 94%       Intake/Output Summary (Last 24 hours) at 8/28/2021 1319  Last data filed at 8/28/2021 0900  Gross per 24 hour   Intake 1570 ml   Output 1250 ml   Net 320 ml     Estimated body mass index is 34.23 kg/m² as calculated from the following:    Height as of this encounter: 5' 8\" (1.727 m). Weight as of this encounter: 225 lb 2 oz (102.1 kg). Physical Exam  Vitals reviewed. Constitutional:       Appearance: Normal appearance. HENT:      Head: Normocephalic. Right Ear: Tympanic membrane normal.      Left Ear: Tympanic membrane normal.      Nose: Nose normal.      Mouth/Throat:      Mouth: Mucous membranes are dry. Eyes:      Pupils: Pupils are equal, round, and reactive to light. Cardiovascular:      Rate and Rhythm: Normal rate and regular rhythm. Pulses: Normal pulses. Pulmonary:      Effort: Pulmonary effort is normal.      Breath sounds: Normal breath sounds. Abdominal:      General: Abdomen is flat. Musculoskeletal:         General: Normal range of motion. Cervical back: Normal range of motion. Skin:     General: Skin is warm. Neurological:      General: No focal deficit present. Mental Status: He is alert. Labs:  Recent Labs     08/27/21  2203 08/28/21  0008 08/28/21  0458   WBC 21.0* 20.4* 18.3*   HGB 16.8 16.5 15.5   * 97* 108*       Recent Labs     08/27/21  2125 08/28/21  0458   * 133*   K 4.8 4.7   CL 99 100   CO2 22 20*   BUN 41* 43*   CREATININE 1.4* 1.4*   LABGLOM 51* 51*   MG 2.1  --    CALCIUM 8.6* 8.6*   PHOS 6.5*  --        CK, CKMB, Troponin: @LABRCNT (CKTOTAL:3, CKMB:3, TROPONINI:3)@    Last 3 BNP:  No results for input(s): BNP in the last 72 hours.         IMAGING:  VL DUP UPPER EXTREMITY ARTERIES LEFT    Result Date: 8/28/2021  Vascular Upper Extremities Arterial Duplex Procedure  Demographics   Patient Name   Luis Fernando Mike     Age                 61                 [facility]   Patient Number [id number]             Gender              Male   Visit Number   [id number]          Interpreting        Haley Gaytan                                    Physician   Date of Birth  1955         Referring Physician Haley Gaytan   Accession      [id number]         [facility],  Number                                                RDMS  Procedure Type of Study:   Extremities Arteries:Upper Extremities Arterial Duplex, ARTERIAL SCAN/UPR  F/U LTD. Indications for Study:Limb ischemia / digital ischemia and Pain, left upper extremity. Risk Factors   - The patient's risk factor(s) include: arterial hypertension. Impression   Axillary artery with pre occlusive waveform. Brachial artery occluded at  the upper arm. Patient has high bifurcation at the upper arm. There is no  flow seen in the ulnar and radial arteries. Subclavian artery appears without aneurysmal dilatation. Signature   ----------------------------------------------------------------  Electronically signed by Candice Do(Interpreting  physician) on 08/28/2021 11:22 AM  ----------------------------------------------------------------  Velocities are measured in cm/s ; Diameters are measured in mm Left Upper Extremities Duplex Measurements +------------------------+----+-----+---+--------+-------------------------+ ! Location                ! PSV ! Ratio! EDV! Diameter! Wave Description         ! +------------------------+----+-----+---+--------+-------------------------+ ! Prox Subclavian         ! 146 !     !   !        !                         ! +------------------------+----+-----+---+--------+-------------------------+ ! Dist Subclavian         !69  !0.47 !   !        !                         ! +------------------------+----+-----+---+--------+-------------------------+ ! Axillary                ! 53.9!     !   !        !                         ! +------------------------+----+-----+---+--------+-------------------------+ ! Prox Brachial           !12.7!0.18 !   !        !                         ! +------------------------+----+-----+---+--------+-------------------------+ ! Prox Radial             !0   !0    !   !        !                         ! +------------------------+----+-----+---+--------+-------------------------+ ! Mid Radial              !0   !     !   !        !                         ! +------------------------+----+-----+---+--------+-------------------------+ ! Dist Radial             !0   !     !   !        !                         ! +------------------------+----+-----+---+--------+-------------------------+ ! Prox Ulnar              !0   !0    !   !        !                         ! +------------------------+----+-----+---+--------+-------------------------+ ! Mid Ulnar               !0   !     !   !        !                         ! +------------------------+----+-----+---+--------+-------------------------+ ! Dist Ulnar              !0   !     !0  !        !                         ! +------------------------+----+-----+---+--------+-------------------------+    XR CHEST PORTABLE    Result Date: 8/28/2021  XR CHEST PORTABLE 8/27/2021 10:15 PM HISTORY: Blue arm COMPARISON: None. FINDINGS: No lung consolidation. No pleural effusion or pneumothorax. The cardiomediastinal silhouette and pulmonary vascularity are within normal limits. The osseous structures and surrounding soft tissues demonstrate no acute abnormality. 1. No radiographic evidence of acute cardiopulmonary process. Signed by Dr Keith Hahn      Assessment:  1. Left upper extremity acute arterial occlusion  2.  HTN  3. Smoker      Recommendations:    ECHO with bubble study to r/o myxoma, vegetations  Preliminary echo shows no obvious source of embolism. OP Zeal patch for 14 days to r/o a fib  Agree with hypercoag work up  OP cardiology FU    D/w pt and vascular surgery.

## 2021-08-28 NOTE — CONSULTS
MEDICAL ONCOLOGY CONSULTATION    Pt Name: Tj Villegas  MRN: [id number]  YOB: 1955  Date of evaluation: 8/28/2021    REASON FOR CONSULTATION: Acute arterial thromboembolism   REQUESTING PHYSICIAN: vascular/hospitalist    History Obtained From:  patient, electronic medical record    HISTORY OF PRESENT ILLNESS:  Vara Harada was first seen by me on 8/28/2021. The patient presented from outside hospital with complaints of acute onset coldness and discoloration of the left upper extremity with finger discoloration. He was seen by vascular and found to have acute arterial occlusion. He was taken to the OR on 8/27/2021 by vascular for exploration and thrombectomy of the brachial, ulnar and radial arteries. Discussed with vascular. Findings of light fatty appearing thrombus. The patient denies any history of atrial fibrillation or any other cardiac history. He denies any recent procedure arterial.   He has received COVID-19 vaccination with more during the vaccine about 6 weeks ago. I was consulted for findings of thrombocytopenia. His CBC showed WBC count 21,000 with differential showed absolute neutrophil count 18,000, hemoglobin 16.8/MCV 90 and platelet counts 399,750. Patient denies any fever chills. Denies using steroids recently. Prior CBCs in 2015 showed normal WBC 6.2 with hemoglobin 16.5 and platelet counts 037,961. Patient is currently receiving argatroban. Heparin was avoided due to thrombocytopenia.       Past Medical History:    Past Medical History:   Diagnosis Date    Black lung ([address]     GERD (gastroesophageal reflux disease), as per medication reconciliation     History of tobacco abuse     Hypertension     Other chronic back pain     back broken in [place] in mine in [facility]       Past Surgical History:    Past Surgical History:   Procedure Laterality Date    NECK SURGERY Left     \"years ago\" a per report on [id number]       Social History:    · Smoking status: Former smoker but quit many years ago. He smoked for 10 to 12 years.   · ETOH status: No [place], [place]  · Resides: [place]    Family History:   Family History   Problem Relation Age of Onset    Heart Disease Mother     Other Mother         tobacco    Heart Disease Father     Other Father         tobacco    Cancer Father     Diabetes Sister     Heart Disease Sister     Other Sister         smoker    Dementia Sister     COPD Sister         smoker    COPD Sister         smoker    Cancer Sister     COPD Sister         heavy smoker    Drug Abuse Brother     No Known Problems Daughter     Heart Disease Daughter         congenital from the Mother's side       Current Hospital Medications:    Current Facility-Administered Medications   Medication Dose Route Frequency Provider Last Rate Last Admin    hydroCHLOROthiazide (HYDRODIURIL) tablet 25 mg  25 mg Oral Daily Veldon Maser, DO   25 mg at 08/28/21 8508    lisinopril (PRINIVIL;ZESTRIL) tablet 20 mg  20 mg Oral Daily Veldon Maser, DO   20 mg at 08/28/21 4597    mirtazapine (REMERON) tablet 45 mg  45 mg Oral Nightly [place] Ivanoff, DO        pantoprazole (PROTONIX) tablet 40 mg  40 mg Oral QAM AC Veldon Maser, DO   40 mg at 08/28/21 5399    0.9 % sodium chloride infusion   IntraVENous Continuous Veldon Maser,  mL/hr at 08/28/21 1067 New Bag at 08/28/21 5805    sodium chloride flush 0.9 % injection 5-40 mL  5-40 mL IntraVENous 2 times per day Veldon Maser, DO   10 mL at 08/28/21 0824    sodium chloride flush 0.9 % injection 5-40 mL  5-40 mL IntraVENous PRN Veldon Maser, DO        0.9 % sodium chloride infusion  25 mL IntraVENous PRN Veldon Maser, DO        ceFAZolin (ANCEF) 2,000 mg in sterile water 20 mL IV syringe  2,000 mg IntraVENous Q8H Veldon Maser, DO   2,000 mg at 08/28/21 0811    HYDROcodone-acetaminophen (NORCO) 7.5-325 MG per tablet 1 tablet  1 tablet Oral Q4H PRN [facility] José Luis, DO        HYDROmorphone HCl PF (DILAUDID) injection 0.5 mg  0.5 mg IntraVENous Q4H PRN [place] Flair, DO        argatroban 250 mg in dextrose 5 % 250 mL infusion  2 mcg/kg/min IntraVENous Continuous Nataliya Flair, DO 12.3 mL/hr at 08/28/21 0812 2 mcg/kg/min at 08/28/21 2893    calcium carbonate (TUMS) chewable tablet 500 mg  500 mg Oral TID PRN [place] Flair, DO        polyethylene glycol (GLYCOLAX) packet 17 g  17 g Oral Daily PRN Nataliya Flair, DO        melatonin disintegrating tablet 5 mg  5 mg Oral Nightly PRN Nataliya Flair, DO        naloxone [facility]) injection 0.4 mg  0.4 mg IntraVENous PRN [place] Flair, DO        sodium chloride flush 0.9 % injection 5-40 mL  5-40 mL IntraVENous 2 times per day Nataliya Flair, DO   10 mL at 08/28/21 3879    sodium chloride flush 0.9 % injection 5-40 mL  5-40 mL IntraVENous PRN Nataliya Flair, DO        0.9 % sodium chloride infusion  25 mL IntraVENous PRN Nataliya Flair, DO        ondansetron (ZOFRAN-ODT) disintegrating tablet 4 mg  4 mg Oral Q8H PRN Nataliya Flair, DO        Or    ondansetron [facility]) injection 4 mg  4 mg IntraVENous Q6H PRN [place] Flair, DO           Allergies: No Known Allergies      Subjective   REVIEW OF SYSTEMS:   CONSTITUTIONAL: no fever, no night sweats,  fatigue;  HEENT: no blurring of vision, no double vision, no hearing difficulty, no tinnitus, no ulceration, no epistaxis;  LUNGS: no cough, no hemoptysis, no wheeze,  no shortness of breath;  CARDIOVASCULAR: no palpitation, no chest pain, no shortness of breath;  GI: no abdominal pain, no nausea, no vomiting, no diarrhea, no constipation;  DAY: no dysuria, no hematuria, no frequency or urgency, no nephrolithiasis;  MUSCULOSKELETAL: Left upper extremity discoloration/coldness, no joint pain, no swelling, no stiffness;  ENDOCRINE: no polyuria, no polydipsia, no cold or heat intolerance;  HEMATOLOGY: no easy bruising or bleeding, no history of clotting disorder;  DERMATOLOGY: no skin rash, no eczema, no pruritus;  PSYCHIATRY: no depression, no anxiety, no panic attacks, no suicidal ideation, no homicidal ideation;  NEUROLOGY: no syncope, no seizures, no numbness or tingling of hands, no numbness or tingling of feet, no paresis;    Objective   /60   Pulse 92   Temp 96.3 °F (35.7 °C) (Temporal)   Resp 16   Ht 5' 8\" (1.727 m)   Wt 225 lb 2 oz (102.1 kg)   SpO2 94%   BMI 34.23 kg/m²     PHYSICAL EXAM:  CONSTITUTIONAL: Alert, appropriate, no acute distress  EYES: Non icteric, EOM intact, pupils equal round   ENT: Mucus membranes moist, no oral pharyngeal lesions, external inspection of ears and nose are normal  NECK: Supple, no masses. No palpable thyroid mass  CHEST/LUNGS: CTA bilaterally, normal respiratory effort   CARDIOVASCULAR: RRR, no murmurs. No lower extremity edema  ABDOMEN: soft non-tender, active bowel sounds, no HSM. No palpable masses  EXTREMITIES: warm, full ROM in all 4 extremities, no focal weakness. SKIN: warm, dry with no rashes or lesions  LYMPH: No cervical, clavicular, axillary, or inguinal lymphadenopathy  NEUROLOGIC: follows commands, non focal   PSYCH: mood and affect appropriate.  Alert and oriented to time, place, person        LABORATORY RESULTS REVIEWED/ANALYZED BY ME:  Recent Labs     08/28/21  0458 08/28/21  0008 08/27/21  2203   WBC 18.3* 20.4* 21.0*   HGB 15.5 16.5 16.8   HCT 47.6 50.8 51.1   MCV 91.5 90.7 90.3   * 97* 103*       Lab Results   Component Value Date     (L) 08/28/2021    K 4.7 08/28/2021     08/28/2021    CO2 20 (L) 08/28/2021    BUN 43 (H) 08/28/2021    CREATININE 1.4 (H) 08/28/2021    GLUCOSE 222 (H) 08/28/2021    CALCIUM 8.6 (L) 08/28/2021    PROT 6.3 (L) 08/27/2021    LABALBU 3.6 08/27/2021    BILITOT 0.7 08/27/2021    ALKPHOS 86 08/27/2021    AST 22 08/27/2021    ALT 44 (H) 08/27/2021    LABGLOM 51 (A) 08/28/2021    GFRAA >59 08/28/2021       Lab Results   Component Value Date    INR 0.99 08/27/2021    PROTIME 13.3 08/27/2021       RADIOLOGY STUDIES REPORT/REVIEWED AND INTERPRETED BY ME:  XR CHEST PORTABLE    Result Date: 8/28/2021  XR CHEST PORTABLE 8/27/2021 10:15 PM HISTORY: Blue arm COMPARISON: None. FINDINGS: No lung consolidation. No pleural effusion or pneumothorax. The cardiomediastinal silhouette and pulmonary vascularity are within normal limits. The osseous structures and surrounding soft tissues demonstrate no acute abnormality. 1. No radiographic evidence of acute cardiopulmonary process. Signed by Dr Darylene Hoffman:  Left upper extremity acute arterial occlusion  Consider cardiac workup for A fib Left atria thrombus  Patient denies any history of atrial fibrillation. Pulse is regular. I reviewed telemetry that showed no evidence of A. fib or any other cardiac arrhythmia. 8/27/2021- Op note  Findings: thrombus occlusion of brachial artery. High bifurcation of the brachial artery into ulnar, radial and interosseus at the same level. Thrombus occlusion of radial artery that required additional midforearm radial artery exposure. Light fatty appearing thrombus with unknown etiology. Send for pathology. Troponin -<0.01     Excerpt from up-to-date: Thromboembolism is the most common cause of acute upper extremity ischemia responsible for 61 percent of cases, and typically affecting older patients. The most common etiologies include atrial fibrillation (51 percent), valvular heart disease (6 percent), and isolated ischemic heart disease with left ventricular hypokinesis (4 percent). Other sources of cardioembolism include left atrial myxoma, left ventricular aneurysm, valvular vegetations in infective endocarditis, or nonbacterial marantic endocarditis.   Atherosclerosis of the ascending aorta or arch, innominate or subclavian arteries, or aneurysm affecting the subclavian, axillary (eg, poststenotic, chronic trauma from crutch use, or ulnar artery (eg, hypothenar hammer syndrome can also be sources of embolism    Discussed with vascular. Apparently no evidence of aortic aneurysm or aneurysm involving the left upper extremity arterial system. Renal impairment-creatinine 1.48/EGFR 51    PLAN:  Continue argatroban  Discussed with vascular. 2D echo  Continue telemetry    I have seen, examined and reviewed this patient medication list, appropriate labs and imaging studies. I reviewed relevant medical records and others physicians notes. I discussed the plans of care with the patient. I answered all the questions to the patients satisfaction. I have also reviewed the chief complaint (CC) and part of the history (History of Present Illness (HPI), Past Family Social History [facility]), or Review of Systems (ROS) and made changes when appropriated.        (Please note that portions of this note were completed with a voice recognition program. Efforts were made to edit the dictations but occasionally words are mis-transcribed.)        Nakul Coto MD    08/28/21  8:52 AM

## 2021-08-28 NOTE — H&P
Patient Information:  Patient: Patsy Rendon  MRN: [id number]   Acct: [de-identified]  YOB: 1955  Admit Date: 8/27/2021      Primary Care Physician: Haydee Goldberg  Advance Directive: Full Code  Health Care Proxy: Mrs. Audrey Barajas, his wife, [phone]        SUBJECTIVE:    OSH Referring ED Sign Out:  onset at about 18:15 of severe left hand pain and purple color. 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 His PMH is HTN  Sx Hx left neck Sx many years ago, else no surgeries  No Allergies  Lisinopril  Mirtazapine  Naprxoen  Omeprazole  HCTZ  Labs remarkable for WBC 30k  Had diarrhea onset today  COVID test negative  Hb 18.3 HCT 53    DDimer 638  INR 1.0  CMP had , Cr 1.5m, BUN 39 nd all else \"looked good\"  Cardiac enzymes negative  EKG \"SR\"  CXR \"unremarkable\"  Nonsmoker    HPI:  Mr. Linda Hernandez is a pleasant  [place] gentleman of 66 years. He presented to the [facility] Emergency Department for sudden left hand pain with purple color. He was referred to us for an ischemic left upper extremity with threatened hand by Emmanuelle Copeland NP. He had the onset at about 18:15 of severe left hand pain and purple color. 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 He is reported to be a nonsmoker with a PMHx of only HTN diagnosed. He is on Lisinopril, HCTZ, Naproxen, Mirtazapine, and Omeprazole at home. He has no known allergies. His only Sx Hx is of left neck surgery \"years ago\". Review of Systems:   Review of Systems   Constitutional: Negative for chills, diaphoresis, fatigue and fever. HENT: Negative for sneezing. Respiratory: Negative for cough. Chronic dyspnea at baseline, chronoic wheezing at baseline   Cardiovascular: Negative for chest pain. Gastrointestinal: Positive for diarrhea and nausea. Negative for abdominal pain, constipation and vomiting. Neurological: Negative for weakness.    Psychiatric/Behavioral: Negative for confusion. Past Medical History:   Diagnosis Date    Black lung ([address]     GERD (gastroesophageal reflux disease), as per medication reconciliation     History of tobacco abuse     Hypertension     Other chronic back pain     back broken in [place] in [address] [facility]     Past Psychiatric History:  Insomnia    Past Surgical History:   Procedure Laterality Date    NECK SURGERY Left     \"years ago\" a per report on [id number]     Social History     Tobacco History     Smoking Status  Former Smoker Quit date  1/1/1990 Smoking Frequency  1 pack/day for 12 years (12 pk yrs) Smoking Tobacco Type  Cigarettes    Smokeless Tobacco Use  Never Used    Tobacco Comment  smoked from age [age], smoked at 1 PPD, tried 2 cigars only in his life          Alcohol History     Alcohol Use Status  Yes Comment  has about a 12 pack per year          Drug Use     Drug Use Status  Not Currently Types  Marijuana Comment  stopped in 1989 or 1990 when they started drug testing          Sexual Activity     Sexually Active  Not Asked Comment  2 daughters         CODE STATUS: Full Code  HEALTH CARE PROXY: Mrs. Deon Simeon, his wife, [phone]  AMBULATES: independently normally  DOMICILED: lives in a private home, lives alone with his wife, has a yorkie, no stairs in the home    Family History   Problem Relation Age of Onset    Heart Disease Mother     Other Mother         tobacco    Heart Disease Father     Other Father         tobacco    Cancer Father     Diabetes Sister     Heart Disease Sister     Other Sister         smoker    Dementia Sister     COPD Sister         smoker    COPD Sister         smoker    Cancer Sister     COPD Sister         heavy smoker    Drug Abuse Brother     No Known Problems Daughter     Heart Disease Daughter         congenital from the Mother's side     Allergies:  No Known Allergies    Home Medications:  Prior to Admission medications    Medication Sig Start Date End Date Taking? Authorizing Provider   lisinopril (PRINIVIL;ZESTRIL) 20 MG tablet Take 20 mg by mouth daily   Yes Historical Provider, MD   mirtazapine (REMERON) 15 MG tablet Take 45 mg by mouth nightly   Yes Historical Provider, MD   naproxen (NAPROSYN) 375 MG tablet Take 375 mg by mouth 2 times daily (with meals)   Yes Historical Provider, MD   omeprazole (PRILOSEC) 20 MG delayed release capsule Take 20 mg by mouth daily   Yes Historical Provider, MD   hydroCHLOROthiazide (HYDRODIURIL) 25 MG tablet Take 25 mg by mouth daily   Yes Historical Provider, MD         OBJECTIVE:    Vitals:    08/27/21 2353   BP: 122/79   Pulse: 65   Resp: 16   Temp: 96.9 °F (36.1 °C)   SpO2: 99%   breathing on nasal cannula    /79   Pulse 65   Temp 96.9 °F (36.1 °C) (Temporal)   Resp 16   Ht 5' 8\" (1.727 m)   Wt 225 lb 12.8 oz (102.4 kg)   SpO2 99%   BMI 34.33 kg/m²     No intake or output data in the 24 hours ending 08/28/21 0023    Physical Exam  Vitals reviewed. Constitutional:       General: He is not in acute distress. Appearance: Normal appearance. He is obese. He is not ill-appearing or toxic-appearing. HENT:      Head: Normocephalic and atraumatic. Nose: No congestion or rhinorrhea. Eyes:      General:         Right eye: No discharge. Left eye: No discharge. Neck:      Comments: Trachea appears midline  Cardiovascular:      Rate and Rhythm: Normal rate and regular rhythm. Heart sounds: No murmur heard. No friction rub. No gallop. Pulmonary:      Effort: Pulmonary effort is normal. No respiratory distress. Breath sounds: No stridor. No wheezing, rhonchi or rales. Chest:      Chest wall: No tenderness. Abdominal:      General: Bowel sounds are normal.      Tenderness: There is no abdominal tenderness. There is no guarding or rebound. Comments: Abdominal obesity   Musculoskeletal:         General: No tenderness or signs of injury. Cervical back: Neck supple.       Right lower leg: No edema. Left lower leg: No edema. Comments: No wasting of muscle stores, has increased fat stores, has bilaterally symmetric  strength, HOWEVER left hand is markedly cooler to touch than right hand   Skin:     General: Skin is warm. Comments: nondiaphoretic   Neurological:      Mental Status: He is alert. Cranial Nerves: No dysarthria. Motor: No tremor or seizure activity.       Comments: Sensation to gross touch of exam bilaterally symmetric   Psychiatric:         Mood and Affect: Mood normal.         Behavior: Behavior normal.       LABORATORY DATA:    CBC:   Recent Labs     08/27/21 2203   WBC 21.0*   HGB 16.8   HCT 51.1   *     BMP:   Recent Labs     08/27/21 2125   *   K 4.8   CL 99   CO2 22   BUN 41*   CREATININE 1.4*   CALCIUM 8.6*   PHOS 6.5*     Hepatic Profile:   Recent Labs     08/27/21 2125   AST 22   ALT 44*   BILITOT 0.7   ALKPHOS 86     Coag Panel:   Recent Labs     08/27/21 2125   INR 0.99   PROTIME 13.3   APTT 22.7*     Cardiac Enzymes:   Recent Labs     08/27/21 2203   TROPONINI <0.01         ASESSMENTS & PLANS:    Critical Left Upper Extremity Ischemia with Painful Purple Left Hand: (no longer purple here)  Vascular Sx already consulted as per transfer center, RN team to alert her when patient arrives  Admit to medical surgical [facility] under hospitalist team  CMP with Mag and Phos on arrival, BMP with Mag reflex from tomorrow  CBC with Differential now and from tomorrow QAM  PTT on arrival, INR and DDimer as well  Heparin GGT high intensity started at OSH ED, will continue here  NPO except sips with meds from arrival  Strict Is and Os  Daily Weights    Chronic Medical Problems:  Continue home regimen as indicated   hydroCHLOROthiazide  25 mg Oral Daily    lisinopril  20 mg Oral Daily    mirtazapine  45 mg Oral Nightly    pantoprazole  40 mg Oral QAM AC   HOLD Naproxen  Holding parameters on antihypertensives    Supportive and Prophylactic:   DVT PPx: Heparin GGT as per above  GI (PUD) PPx: not indicated as such but covered as per home regimen  PT: not indicated from my view point, defer to Vascular Surgery  Diet NPO Exceptions are: Sips of Water with Meds  meperidine, HYDROmorphone, HYDROmorphone, morphine, morphine, promethazine, metoclopramide, diphenhydrAMINE, labetalol, hydrALAZINE, enalaprilat, heparin irrigation, heparin (porcine), heparin (porcine), calcium carbonate, polyethylene glycol, melatonin, naloxone, sodium chloride flush, sodium chloride, ondansetron **OR** ondansetron      Care time of >40 minutes  Pt seen/examined and admitted to inpatient status. Inpatient status is used for patients with an expected LOS extending past two midnights due to medical therapy and or critical care needs, otherwise patients are placed to OBServation status. Signed:  Electronically signed by Suzy Camilo MD on 8/28/21 at 12:34 AM CDT.

## 2021-08-29 ENCOUNTER — ANESTHESIA (OUTPATIENT)
Dept: OPERATING ROOM | Age: 66
DRG: 253 | End: 2021-08-29
Payer: OTHER GOVERNMENT

## 2021-08-29 ENCOUNTER — ANESTHESIA EVENT (OUTPATIENT)
Dept: OPERATING ROOM | Age: 66
DRG: 253 | End: 2021-08-29
Payer: OTHER GOVERNMENT

## 2021-08-29 ENCOUNTER — APPOINTMENT (OUTPATIENT)
Dept: INTERVENTIONAL RADIOLOGY/VASCULAR | Age: 66
DRG: 253 | End: 2021-08-29
Attending: HOSPITALIST
Payer: OTHER GOVERNMENT

## 2021-08-29 VITALS — OXYGEN SATURATION: 90 % | SYSTOLIC BLOOD PRESSURE: 84 MMHG | DIASTOLIC BLOOD PRESSURE: 46 MMHG | TEMPERATURE: 99.1 F

## 2021-08-29 LAB
ANION GAP SERPL CALCULATED.3IONS-SCNC: 10 MMOL/L (ref 7–19)
APTT: 45 SEC (ref 26–36.2)
APTT: 54.8 SEC (ref 26–36.2)
APTT: 57.8 SEC (ref 26–36.2)
BASOPHILS ABSOLUTE: 0 K/UL (ref 0–0.2)
BASOPHILS RELATIVE PERCENT: 0.2 % (ref 0–1)
BUN BLDV-MCNC: 48 MG/DL (ref 8–23)
CALCIUM SERPL-MCNC: 7.8 MG/DL (ref 8.8–10.2)
CHLORIDE BLD-SCNC: 98 MMOL/L (ref 98–111)
CO2: 23 MMOL/L (ref 22–29)
CREAT SERPL-MCNC: 1.2 MG/DL (ref 0.5–1.2)
EOSINOPHILS ABSOLUTE: 0 K/UL (ref 0–0.6)
EOSINOPHILS RELATIVE PERCENT: 0 % (ref 0–5)
GFR AFRICAN AMERICAN: >59
GFR NON-AFRICAN AMERICAN: >60
GLUCOSE BLD-MCNC: 267 MG/DL (ref 74–109)
HCT VFR BLD CALC: 40.5 % (ref 42–52)
HCT VFR BLD CALC: 41.8 % (ref 42–52)
HEMOGLOBIN: 12.9 G/DL (ref 14–18)
HEMOGLOBIN: 13.6 G/DL (ref 14–18)
IMMATURE GRANULOCYTES #: 0.2 K/UL
LYMPHOCYTES ABSOLUTE: 1.1 K/UL (ref 1.1–4.5)
LYMPHOCYTES RELATIVE PERCENT: 5.5 % (ref 20–40)
MCH RBC QN AUTO: 29.8 PG (ref 27–31)
MCH RBC QN AUTO: 29.8 PG (ref 27–31)
MCHC RBC AUTO-ENTMCNC: 31.9 G/DL (ref 33–37)
MCHC RBC AUTO-ENTMCNC: 32.5 G/DL (ref 33–37)
MCV RBC AUTO: 91.5 FL (ref 80–94)
MCV RBC AUTO: 93.5 FL (ref 80–94)
MONOCYTES ABSOLUTE: 1.3 K/UL (ref 0–0.9)
MONOCYTES RELATIVE PERCENT: 6.8 % (ref 0–10)
NEUTROPHILS ABSOLUTE: 16.6 K/UL (ref 1.5–7.5)
NEUTROPHILS RELATIVE PERCENT: 86.5 % (ref 50–65)
PDW BLD-RTO: 13.1 % (ref 11.5–14.5)
PDW BLD-RTO: 13.1 % (ref 11.5–14.5)
PHOSPHORUS: 4 MG/DL (ref 2.5–4.5)
PLATELET # BLD: 115 K/UL (ref 130–400)
PLATELET # BLD: 146 K/UL (ref 130–400)
PMV BLD AUTO: 10.7 FL (ref 9.4–12.4)
PMV BLD AUTO: 11 FL (ref 9.4–12.4)
POC ACT LR: 240 SEC
POTASSIUM REFLEX MAGNESIUM: 5 MMOL/L (ref 3.5–5)
RBC # BLD: 4.33 M/UL (ref 4.7–6.1)
RBC # BLD: 4.57 M/UL (ref 4.7–6.1)
SODIUM BLD-SCNC: 131 MMOL/L (ref 136–145)
WBC # BLD: 19.2 K/UL (ref 4.8–10.8)
WBC # BLD: 22.6 K/UL (ref 4.8–10.8)

## 2021-08-29 PROCEDURE — 06BQ0ZZ EXCISION OF LEFT SAPHENOUS VEIN, OPEN APPROACH: ICD-10-PCS | Performed by: SURGERY

## 2021-08-29 PROCEDURE — 99231 SBSQ HOSP IP/OBS SF/LOW 25: CPT | Performed by: INTERNAL MEDICINE

## 2021-08-29 PROCEDURE — C1760 CLOSURE DEV, VASC: HCPCS | Performed by: SURGERY

## 2021-08-29 PROCEDURE — 2500000003 HC RX 250 WO HCPCS

## 2021-08-29 PROCEDURE — C1894 INTRO/SHEATH, NON-LASER: HCPCS | Performed by: SURGERY

## 2021-08-29 PROCEDURE — 3700000000 HC ANESTHESIA ATTENDED CARE: Performed by: SURGERY

## 2021-08-29 PROCEDURE — C1876 STENT, NON-COA/NON-COV W/DEL: HCPCS | Performed by: SURGERY

## 2021-08-29 PROCEDURE — 99024 POSTOP FOLLOW-UP VISIT: CPT | Performed by: SURGERY

## 2021-08-29 PROCEDURE — 7100000001 HC PACU RECOVERY - ADDTL 15 MIN: Performed by: SURGERY

## 2021-08-29 PROCEDURE — 88304 TISSUE EXAM BY PATHOLOGIST: CPT

## 2021-08-29 PROCEDURE — 75710 ARTERY X-RAYS ARM/LEG: CPT

## 2021-08-29 PROCEDURE — 75710 ARTERY X-RAYS ARM/LEG: CPT | Performed by: SURGERY

## 2021-08-29 PROCEDURE — 03CA0ZZ EXTIRPATION OF MATTER FROM LEFT ULNAR ARTERY, OPEN APPROACH: ICD-10-PCS | Performed by: SURGERY

## 2021-08-29 PROCEDURE — 36215 PLACE CATHETER IN ARTERY: CPT | Performed by: SURGERY

## 2021-08-29 PROCEDURE — 03CC0ZZ EXTIRPATION OF MATTER FROM LEFT RADIAL ARTERY, OPEN APPROACH: ICD-10-PCS | Performed by: SURGERY

## 2021-08-29 PROCEDURE — 6360000002 HC RX W HCPCS: Performed by: SURGERY

## 2021-08-29 PROCEDURE — 2580000003 HC RX 258: Performed by: SURGERY

## 2021-08-29 PROCEDURE — C1769 GUIDE WIRE: HCPCS | Performed by: SURGERY

## 2021-08-29 PROCEDURE — 84100 ASSAY OF PHOSPHORUS: CPT

## 2021-08-29 PROCEDURE — 03H43DZ INSERTION OF INTRALUMINAL DEVICE INTO LEFT SUBCLAVIAN ARTERY, PERCUTANEOUS APPROACH: ICD-10-PCS | Performed by: SURGERY

## 2021-08-29 PROCEDURE — C1887 CATHETER, GUIDING: HCPCS | Performed by: SURGERY

## 2021-08-29 PROCEDURE — C1757 CATH, THROMBECTOMY/EMBOLECT: HCPCS | Performed by: SURGERY

## 2021-08-29 PROCEDURE — 36221 PLACE CATH THORACIC AORTA: CPT | Performed by: SURGERY

## 2021-08-29 PROCEDURE — 2000000000 HC ICU R&B

## 2021-08-29 PROCEDURE — B31J1ZZ FLUOROSCOPY OF LEFT UPPER EXTREMITY ARTERIES USING LOW OSMOLAR CONTRAST: ICD-10-PCS | Performed by: SURGERY

## 2021-08-29 PROCEDURE — 37236 OPEN/PERQ PLACE STENT 1ST: CPT | Performed by: SURGERY

## 2021-08-29 PROCEDURE — 36415 COLL VENOUS BLD VENIPUNCTURE: CPT

## 2021-08-29 PROCEDURE — 93931 UPPER EXTREMITY STUDY: CPT

## 2021-08-29 PROCEDURE — 2580000003 HC RX 258

## 2021-08-29 PROCEDURE — 85025 COMPLETE CBC W/AUTO DIFF WBC: CPT

## 2021-08-29 PROCEDURE — 85027 COMPLETE CBC AUTOMATED: CPT

## 2021-08-29 PROCEDURE — 34101 REMOVAL OF ARTERY CLOT: CPT | Performed by: SURGERY

## 2021-08-29 PROCEDURE — 85347 COAGULATION TIME ACTIVATED: CPT

## 2021-08-29 PROCEDURE — C1893 INTRO/SHEATH, FIXED,NON-PEEL: HCPCS | Performed by: SURGERY

## 2021-08-29 PROCEDURE — 6360000002 HC RX W HCPCS: Performed by: HOSPITALIST

## 2021-08-29 PROCEDURE — 2709999900 HC NON-CHARGEABLE SUPPLY: Performed by: SURGERY

## 2021-08-29 PROCEDURE — 6370000000 HC RX 637 (ALT 250 FOR IP): Performed by: SURGERY

## 2021-08-29 PROCEDURE — 7100000000 HC PACU RECOVERY - FIRST 15 MIN: Performed by: SURGERY

## 2021-08-29 PROCEDURE — 03U807Z SUPPLEMENT LEFT BRACHIAL ARTERY WITH AUTOLOGOUS TISSUE SUBSTITUTE, OPEN APPROACH: ICD-10-PCS | Performed by: SURGERY

## 2021-08-29 PROCEDURE — 2500000003 HC RX 250 WO HCPCS: Performed by: SURGERY

## 2021-08-29 PROCEDURE — 80048 BASIC METABOLIC PNL TOTAL CA: CPT

## 2021-08-29 PROCEDURE — C1725 CATH, TRANSLUMIN NON-LASER: HCPCS | Performed by: SURGERY

## 2021-08-29 PROCEDURE — 6360000002 HC RX W HCPCS

## 2021-08-29 PROCEDURE — 85730 THROMBOPLASTIN TIME PARTIAL: CPT

## 2021-08-29 PROCEDURE — 3700000001 HC ADD 15 MINUTES (ANESTHESIA): Performed by: SURGERY

## 2021-08-29 PROCEDURE — 3600000007 HC SURGERY HYBRID BASE: Performed by: SURGERY

## 2021-08-29 PROCEDURE — 3600000017 HC SURGERY HYBRID ADDL 15MIN: Performed by: SURGERY

## 2021-08-29 PROCEDURE — 34111 REMOVAL OF ARM ARTERY CLOT: CPT | Performed by: SURGERY

## 2021-08-29 DEVICE — PREMOUNTED STENT SYSTEM
Type: IMPLANTABLE DEVICE | Site: SUBCLAVIAN | Status: FUNCTIONAL
Brand: EXPRESS® LD ILIAC / BILIARY

## 2021-08-29 RX ORDER — LIDOCAINE HYDROCHLORIDE 10 MG/ML
INJECTION, SOLUTION EPIDURAL; INFILTRATION; INTRACAUDAL; PERINEURAL PRN
Status: DISCONTINUED | OUTPATIENT
Start: 2021-08-29 | End: 2021-08-29 | Stop reason: SDUPTHER

## 2021-08-29 RX ORDER — LABETALOL HYDROCHLORIDE 5 MG/ML
5 INJECTION, SOLUTION INTRAVENOUS EVERY 10 MIN PRN
Status: DISCONTINUED | OUTPATIENT
Start: 2021-08-29 | End: 2021-08-29 | Stop reason: HOSPADM

## 2021-08-29 RX ORDER — MORPHINE SULFATE 4 MG/ML
4 INJECTION, SOLUTION INTRAMUSCULAR; INTRAVENOUS EVERY 5 MIN PRN
Status: DISCONTINUED | OUTPATIENT
Start: 2021-08-29 | End: 2021-08-29 | Stop reason: HOSPADM

## 2021-08-29 RX ORDER — CALCIUM CHLORIDE 100 MG/ML
INJECTION INTRAVENOUS; INTRAVENTRICULAR PRN
Status: DISCONTINUED | OUTPATIENT
Start: 2021-08-29 | End: 2021-08-29 | Stop reason: SDUPTHER

## 2021-08-29 RX ORDER — DIPHENHYDRAMINE HYDROCHLORIDE 50 MG/ML
12.5 INJECTION INTRAMUSCULAR; INTRAVENOUS ONCE
Status: COMPLETED | OUTPATIENT
Start: 2021-08-29 | End: 2021-08-29

## 2021-08-29 RX ORDER — HYDROMORPHONE HYDROCHLORIDE 1 MG/ML
0.5 INJECTION, SOLUTION INTRAMUSCULAR; INTRAVENOUS; SUBCUTANEOUS
Status: DISCONTINUED | OUTPATIENT
Start: 2021-08-29 | End: 2021-08-30

## 2021-08-29 RX ORDER — PROMETHAZINE HYDROCHLORIDE 25 MG/ML
6.25 INJECTION, SOLUTION INTRAMUSCULAR; INTRAVENOUS
Status: DISCONTINUED | OUTPATIENT
Start: 2021-08-29 | End: 2021-08-29 | Stop reason: HOSPADM

## 2021-08-29 RX ORDER — MEPERIDINE HYDROCHLORIDE 25 MG/ML
12.5 INJECTION INTRAMUSCULAR; INTRAVENOUS; SUBCUTANEOUS EVERY 5 MIN PRN
Status: DISCONTINUED | OUTPATIENT
Start: 2021-08-29 | End: 2021-08-29 | Stop reason: HOSPADM

## 2021-08-29 RX ORDER — PROPOFOL 10 MG/ML
INJECTION, EMULSION INTRAVENOUS PRN
Status: DISCONTINUED | OUTPATIENT
Start: 2021-08-29 | End: 2021-08-29 | Stop reason: SDUPTHER

## 2021-08-29 RX ORDER — ENALAPRILAT 2.5 MG/2ML
1.25 INJECTION INTRAVENOUS
Status: DISCONTINUED | OUTPATIENT
Start: 2021-08-29 | End: 2021-08-29 | Stop reason: HOSPADM

## 2021-08-29 RX ORDER — SODIUM CHLORIDE 0.9 % (FLUSH) 0.9 %
5-40 SYRINGE (ML) INJECTION PRN
Status: DISCONTINUED | OUTPATIENT
Start: 2021-08-29 | End: 2021-09-01 | Stop reason: HOSPADM

## 2021-08-29 RX ORDER — SUCCINYLCHOLINE CHLORIDE 20 MG/ML
INJECTION INTRAMUSCULAR; INTRAVENOUS PRN
Status: DISCONTINUED | OUTPATIENT
Start: 2021-08-29 | End: 2021-08-29 | Stop reason: SDUPTHER

## 2021-08-29 RX ORDER — ROCURONIUM BROMIDE 10 MG/ML
INJECTION, SOLUTION INTRAVENOUS PRN
Status: DISCONTINUED | OUTPATIENT
Start: 2021-08-29 | End: 2021-08-29 | Stop reason: SDUPTHER

## 2021-08-29 RX ORDER — SODIUM CHLORIDE 0.9 % (FLUSH) 0.9 %
5-40 SYRINGE (ML) INJECTION EVERY 12 HOURS SCHEDULED
Status: DISCONTINUED | OUTPATIENT
Start: 2021-08-29 | End: 2021-09-01 | Stop reason: HOSPADM

## 2021-08-29 RX ORDER — SODIUM CHLORIDE 9 MG/ML
25 INJECTION, SOLUTION INTRAVENOUS PRN
Status: DISCONTINUED | OUTPATIENT
Start: 2021-08-29 | End: 2021-09-01 | Stop reason: HOSPADM

## 2021-08-29 RX ORDER — NITROGLYCERIN 20 MG/100ML
INJECTION INTRAVENOUS CONTINUOUS PRN
Status: DISCONTINUED | OUTPATIENT
Start: 2021-08-29 | End: 2021-08-29 | Stop reason: SDUPTHER

## 2021-08-29 RX ORDER — DIPHENHYDRAMINE HYDROCHLORIDE 50 MG/ML
12.5 INJECTION INTRAMUSCULAR; INTRAVENOUS
Status: DISCONTINUED | OUTPATIENT
Start: 2021-08-29 | End: 2021-08-29 | Stop reason: HOSPADM

## 2021-08-29 RX ORDER — SODIUM CHLORIDE 9 MG/ML
INJECTION, SOLUTION INTRAVENOUS CONTINUOUS
Status: DISCONTINUED | OUTPATIENT
Start: 2021-08-29 | End: 2021-08-30

## 2021-08-29 RX ORDER — NITROGLYCERIN 20 MG/100ML
5-200 INJECTION INTRAVENOUS CONTINUOUS
Status: DISCONTINUED | OUTPATIENT
Start: 2021-08-29 | End: 2021-09-01 | Stop reason: HOSPADM

## 2021-08-29 RX ORDER — FENTANYL CITRATE 50 UG/ML
INJECTION, SOLUTION INTRAMUSCULAR; INTRAVENOUS PRN
Status: DISCONTINUED | OUTPATIENT
Start: 2021-08-29 | End: 2021-08-29 | Stop reason: SDUPTHER

## 2021-08-29 RX ORDER — ONDANSETRON 2 MG/ML
INJECTION INTRAMUSCULAR; INTRAVENOUS PRN
Status: DISCONTINUED | OUTPATIENT
Start: 2021-08-29 | End: 2021-08-29 | Stop reason: SDUPTHER

## 2021-08-29 RX ORDER — SODIUM CHLORIDE, SODIUM LACTATE, POTASSIUM CHLORIDE, CALCIUM CHLORIDE 600; 310; 30; 20 MG/100ML; MG/100ML; MG/100ML; MG/100ML
INJECTION, SOLUTION INTRAVENOUS CONTINUOUS PRN
Status: DISCONTINUED | OUTPATIENT
Start: 2021-08-29 | End: 2021-08-29 | Stop reason: SDUPTHER

## 2021-08-29 RX ORDER — HYDRALAZINE HYDROCHLORIDE 20 MG/ML
5 INJECTION INTRAMUSCULAR; INTRAVENOUS EVERY 10 MIN PRN
Status: DISCONTINUED | OUTPATIENT
Start: 2021-08-29 | End: 2021-08-29 | Stop reason: HOSPADM

## 2021-08-29 RX ORDER — HYDROMORPHONE HYDROCHLORIDE 1 MG/ML
0.25 INJECTION, SOLUTION INTRAMUSCULAR; INTRAVENOUS; SUBCUTANEOUS EVERY 5 MIN PRN
Status: DISCONTINUED | OUTPATIENT
Start: 2021-08-29 | End: 2021-08-29 | Stop reason: HOSPADM

## 2021-08-29 RX ORDER — DEXAMETHASONE SODIUM PHOSPHATE 10 MG/ML
INJECTION, SOLUTION INTRAMUSCULAR; INTRAVENOUS PRN
Status: DISCONTINUED | OUTPATIENT
Start: 2021-08-29 | End: 2021-08-29 | Stop reason: SDUPTHER

## 2021-08-29 RX ORDER — METOCLOPRAMIDE HYDROCHLORIDE 5 MG/ML
10 INJECTION INTRAMUSCULAR; INTRAVENOUS
Status: DISCONTINUED | OUTPATIENT
Start: 2021-08-29 | End: 2021-08-29 | Stop reason: HOSPADM

## 2021-08-29 RX ORDER — HYDROMORPHONE HYDROCHLORIDE 1 MG/ML
0.5 INJECTION, SOLUTION INTRAMUSCULAR; INTRAVENOUS; SUBCUTANEOUS EVERY 5 MIN PRN
Status: DISCONTINUED | OUTPATIENT
Start: 2021-08-29 | End: 2021-08-29 | Stop reason: HOSPADM

## 2021-08-29 RX ORDER — MORPHINE SULFATE 4 MG/ML
2 INJECTION, SOLUTION INTRAMUSCULAR; INTRAVENOUS EVERY 5 MIN PRN
Status: DISCONTINUED | OUTPATIENT
Start: 2021-08-29 | End: 2021-08-29 | Stop reason: HOSPADM

## 2021-08-29 RX ORDER — CEFAZOLIN SODIUM 1 G/3ML
INJECTION, POWDER, FOR SOLUTION INTRAMUSCULAR; INTRAVENOUS PRN
Status: DISCONTINUED | OUTPATIENT
Start: 2021-08-29 | End: 2021-08-29 | Stop reason: SDUPTHER

## 2021-08-29 RX ORDER — MIDAZOLAM HYDROCHLORIDE 1 MG/ML
INJECTION INTRAMUSCULAR; INTRAVENOUS PRN
Status: DISCONTINUED | OUTPATIENT
Start: 2021-08-29 | End: 2021-08-29 | Stop reason: SDUPTHER

## 2021-08-29 RX ADMIN — PHENYLEPHRINE HYDROCHLORIDE 100 MCG: 10 INJECTION INTRAVENOUS at 11:56

## 2021-08-29 RX ADMIN — MIDAZOLAM 2 MG: 1 INJECTION INTRAMUSCULAR; INTRAVENOUS at 11:33

## 2021-08-29 RX ADMIN — PROPOFOL 160 MG: 10 INJECTION, EMULSION INTRAVENOUS at 11:47

## 2021-08-29 RX ADMIN — HYDROCODONE BITARTRATE AND ACETAMINOPHEN 1 TABLET: 7.5; 325 TABLET ORAL at 19:25

## 2021-08-29 RX ADMIN — PANTOPRAZOLE SODIUM 40 MG: 40 TABLET, DELAYED RELEASE ORAL at 05:46

## 2021-08-29 RX ADMIN — CALCIUM CHLORIDE 0.5 G: 100 INJECTION, SOLUTION INTRAVENOUS; INTRAVENTRICULAR at 11:56

## 2021-08-29 RX ADMIN — CEFAZOLIN SODIUM 2000 MG: 1 INJECTION, POWDER, FOR SOLUTION INTRAMUSCULAR; INTRAVENOUS at 11:57

## 2021-08-29 RX ADMIN — SODIUM CHLORIDE, SODIUM LACTATE, POTASSIUM CHLORIDE, AND CALCIUM CHLORIDE: 600; 310; 30; 20 INJECTION, SOLUTION INTRAVENOUS at 12:37

## 2021-08-29 RX ADMIN — Medication 5 MCG/MIN: at 16:26

## 2021-08-29 RX ADMIN — CALCIUM CHLORIDE 0.5 G: 100 INJECTION, SOLUTION INTRAVENOUS; INTRAVENTRICULAR at 12:29

## 2021-08-29 RX ADMIN — FENTANYL CITRATE 50 MCG: 50 INJECTION, SOLUTION INTRAMUSCULAR; INTRAVENOUS at 11:40

## 2021-08-29 RX ADMIN — MIRTAZAPINE 45 MG: 15 TABLET, FILM COATED ORAL at 21:38

## 2021-08-29 RX ADMIN — ARGATROBAN 2.5 MCG/KG/MIN: 100 INJECTION, SOLUTION INTRAVENOUS at 21:55

## 2021-08-29 RX ADMIN — ROCURONIUM BROMIDE 50 MG: 10 INJECTION, SOLUTION INTRAVENOUS at 13:22

## 2021-08-29 RX ADMIN — LISINOPRIL 20 MG: 20 TABLET ORAL at 08:59

## 2021-08-29 RX ADMIN — FENTANYL CITRATE 50 MCG: 50 INJECTION, SOLUTION INTRAMUSCULAR; INTRAVENOUS at 11:33

## 2021-08-29 RX ADMIN — SODIUM CHLORIDE: 9 INJECTION, SOLUTION INTRAVENOUS at 19:25

## 2021-08-29 RX ADMIN — SODIUM CHLORIDE, PRESERVATIVE FREE 10 ML: 5 INJECTION INTRAVENOUS at 21:39

## 2021-08-29 RX ADMIN — DIPHENHYDRAMINE HYDROCHLORIDE 12.5 MG: 50 INJECTION, SOLUTION INTRAMUSCULAR; INTRAVENOUS at 23:22

## 2021-08-29 RX ADMIN — ONDANSETRON HYDROCHLORIDE 4 MG: 2 INJECTION, SOLUTION INTRAMUSCULAR; INTRAVENOUS at 16:46

## 2021-08-29 RX ADMIN — ROCURONIUM BROMIDE 50 MG: 10 INJECTION, SOLUTION INTRAVENOUS at 12:00

## 2021-08-29 RX ADMIN — SODIUM CHLORIDE, SODIUM LACTATE, POTASSIUM CHLORIDE, AND CALCIUM CHLORIDE: 600; 310; 30; 20 INJECTION, SOLUTION INTRAVENOUS at 11:26

## 2021-08-29 RX ADMIN — HYDROCHLOROTHIAZIDE 25 MG: 25 TABLET ORAL at 08:59

## 2021-08-29 RX ADMIN — NITROGLYCERIN 5 MCG/MIN: 20 INJECTION INTRAVENOUS at 19:21

## 2021-08-29 RX ADMIN — SUGAMMADEX 204 MG: 100 INJECTION, SOLUTION INTRAVENOUS at 16:48

## 2021-08-29 RX ADMIN — Medication 5 MG: at 21:38

## 2021-08-29 RX ADMIN — DEXAMETHASONE SODIUM PHOSPHATE 10 MG: 10 INJECTION, SOLUTION INTRAMUSCULAR; INTRAVENOUS at 11:58

## 2021-08-29 RX ADMIN — LIDOCAINE HYDROCHLORIDE 50 MG: 10 INJECTION, SOLUTION EPIDURAL; INFILTRATION; INTRACAUDAL; PERINEURAL at 11:47

## 2021-08-29 RX ADMIN — SODIUM CHLORIDE, PRESERVATIVE FREE 10 ML: 5 INJECTION INTRAVENOUS at 21:40

## 2021-08-29 RX ADMIN — CEFEPIME HYDROCHLORIDE 2000 MG: 2 INJECTION, POWDER, FOR SOLUTION INTRAVENOUS at 21:45

## 2021-08-29 RX ADMIN — HYDROMORPHONE HYDROCHLORIDE 0.5 MG: 1 INJECTION, SOLUTION INTRAMUSCULAR; INTRAVENOUS; SUBCUTANEOUS at 17:32

## 2021-08-29 RX ADMIN — HYDROMORPHONE HYDROCHLORIDE 0.5 MG: 1 INJECTION, SOLUTION INTRAMUSCULAR; INTRAVENOUS; SUBCUTANEOUS at 18:00

## 2021-08-29 RX ADMIN — CEFAZOLIN SODIUM 1000 MG: 1 INJECTION, POWDER, FOR SOLUTION INTRAMUSCULAR; INTRAVENOUS at 15:57

## 2021-08-29 RX ADMIN — HYDROMORPHONE HYDROCHLORIDE 0.5 MG: 1 INJECTION, SOLUTION INTRAMUSCULAR; INTRAVENOUS; SUBCUTANEOUS at 22:38

## 2021-08-29 RX ADMIN — ARGATROBAN 2 MCG/KG/MIN: 100 INJECTION, SOLUTION INTRAVENOUS at 03:22

## 2021-08-29 RX ADMIN — ANTACID TABLETS 500 MG: 500 TABLET, CHEWABLE ORAL at 02:33

## 2021-08-29 RX ADMIN — HYDROMORPHONE HYDROCHLORIDE 0.5 MG: 1 INJECTION, SOLUTION INTRAMUSCULAR; INTRAVENOUS; SUBCUTANEOUS at 20:34

## 2021-08-29 RX ADMIN — SUCCINYLCHOLINE CHLORIDE 100 MG: 20 INJECTION, SOLUTION INTRAMUSCULAR; INTRAVENOUS at 11:47

## 2021-08-29 ASSESSMENT — PAIN DESCRIPTION - ORIENTATION
ORIENTATION: LEFT

## 2021-08-29 ASSESSMENT — PAIN DESCRIPTION - LOCATION
LOCATION: BACK
LOCATION: HAND
LOCATION: BACK
LOCATION: HAND

## 2021-08-29 ASSESSMENT — PAIN DESCRIPTION - PROGRESSION: CLINICAL_PROGRESSION: GRADUALLY IMPROVING

## 2021-08-29 ASSESSMENT — PAIN SCALES - GENERAL
PAINLEVEL_OUTOF10: 8
PAINLEVEL_OUTOF10: 7
PAINLEVEL_OUTOF10: 5
PAINLEVEL_OUTOF10: 10
PAINLEVEL_OUTOF10: 8
PAINLEVEL_OUTOF10: 4
PAINLEVEL_OUTOF10: 3

## 2021-08-29 ASSESSMENT — PAIN DESCRIPTION - PAIN TYPE
TYPE: ACUTE PAIN;CHRONIC PAIN
TYPE: SURGICAL PAIN;ACUTE PAIN
TYPE: ACUTE PAIN;SURGICAL PAIN
TYPE: SURGICAL PAIN;CHRONIC PAIN

## 2021-08-29 ASSESSMENT — PAIN DESCRIPTION - FREQUENCY
FREQUENCY: CONTINUOUS
FREQUENCY: OTHER (COMMENT)

## 2021-08-29 ASSESSMENT — PAIN DESCRIPTION - ONSET
ONSET: SUDDEN
ONSET: PROGRESSIVE
ONSET: PROGRESSIVE

## 2021-08-29 ASSESSMENT — PAIN DESCRIPTION - DESCRIPTORS
DESCRIPTORS: SHOOTING;SORE
DESCRIPTORS: SHARP;SHOOTING
DESCRIPTORS: SHOOTING;SHARP

## 2021-08-29 ASSESSMENT — PAIN - FUNCTIONAL ASSESSMENT: PAIN_FUNCTIONAL_ASSESSMENT: PREVENTS OR INTERFERES SOME ACTIVE ACTIVITIES AND ADLS

## 2021-08-29 NOTE — PROGRESS NOTES
CBC results called to Dr. Lidia Smalls. Pt.'s BP currently 145/65. Orders given to restart at 5 mcg/min and increase as pt.'s BP tolerates.

## 2021-08-29 NOTE — PROGRESS NOTES
Vascular Surgery -  Sharmaine Dhillon M.D. Daily Progress Note    Pt Name: Zuleika Handy  Medical Record Number: [id number]  Date of Birth 1955   Today's Date: 8/29/2021    Chief Complaint:  No chief complaint on file. SUBJECTIVE:     Patient was seen and examined. Pain is  controlled  No complaints     OBJECTIVE:     Patient Vitals for the past 24 hrs:   BP Temp Temp src Pulse Resp SpO2   08/29/21 0738 126/71 97.2 °F (36.2 °C) Temporal 94 16 96 %   08/28/21 2316 (!) 95/48 97.1 °F (36.2 °C) Temporal 81 16 94 %   08/28/21 1930 -- -- -- 82 -- --   08/28/21 1857 128/65 97 °F (36.1 °C) -- 87 16 96 %   08/28/21 1141 106/65 97.7 °F (36.5 °C) Temporal 87 17 94 %         Intake/Output Summary (Last 24 hours) at 8/29/2021 1110  Last data filed at 8/29/2021 0725  Gross per 24 hour   Intake 648.65 ml   Output 2350 ml   Net -1701.35 ml       In: 648.7 [P.O.:240; I.V.:408.7]  Out: 2350 [Urine:2350]    I/O last 3 completed shifts: In: 1218.7 [P.O.:810; I.V.:408.7]  Out: 2200 [Urine:2200]     Date 08/29/21 0000 - 08/29/21 2359   Shift 2899-7610 3899-0298 1035-0879 24 Hour Total   INTAKE   I.V.(mL/kg) 408. 7(4)   408. 7(4)   Shift Total(mL/kg) 408. 7(4)   408. 7(4)   OUTPUT   Urine(mL/kg/hr) 6044(7.0)   2000   Shift Total(mL/kg) 5420(30.5)   2000(19.6)   Weight (kg) 102.1 102.1 102.1 102.1       Wt Readings from Last 3 Encounters:   08/28/21 225 lb 2 oz (102.1 kg)        Body mass index is 34.23 kg/m².      Diet: Diet NPO Exceptions are: Sips of Water with Meds        MEDS:     Scheduled Meds:   cefepime  2,000 mg IntraVENous Q12H    hydroCHLOROthiazide  25 mg Oral Daily    lisinopril  20 mg Oral Daily    mirtazapine  45 mg Oral Nightly    pantoprazole  40 mg Oral QAM AC    sodium chloride flush  5-40 mL IntraVENous 2 times per day    sodium chloride flush  5-40 mL IntraVENous 2 times per day     Continuous Infusions:   sodium chloride 100 mL/hr at 08/28/21 4584    sodium chloride      argatroban infusion 250 mg in 250 mL 2.5 mcg/kg/min (21 0805)    sodium chloride       PRN Meds:sodium chloride flush, 5-40 mL, PRN  sodium chloride, 25 mL, PRN  HYDROcodone-acetaminophen, 1 tablet, Q4H PRN  HYDROmorphone, 0.5 mg, Q4H PRN  calcium carbonate, 500 mg, TID PRN  polyethylene glycol, 17 g, Daily PRN  melatonin, 5 mg, Nightly PRN  naloxone, 0.4 mg, PRN  sodium chloride flush, 5-40 mL, PRN  sodium chloride, 25 mL, PRN  ondansetron, 4 mg, Q8H PRN   Or  ondansetron, 4 mg, Q6H PRN          PHYSICAL EXAM:     CONSTITUTIONAL: Alert and oriented times 3, no acute distress and cooperative to examination. HEENT: Head is normocephalic, atraumatic. NECK: Soft, trachea midline and straight  LUNGS: Chest expands equally bilaterally upon respiration, no accessory muscle used. Ausculation reveals no wheezes, rales or rhonchi. CARDIOVASCULAR: Heart regular rate and rhythm  ABDOMEN: not examined  NEUROLOGIC: CN II-XII are grossly intact. There are no focalizing motor or sensory deficits  WOUND/INCISION:  healing well, no drainage, no erythema  EXTREMITY: during exam this am patient has distal left finger tip discoloration,   Doppler signals. He is  motor/sensory intact    LABS:       CBC:   Recent Labs     21  0008 21  0458 21  0719   WBC 20.4* 18.3* 19.2*   RBC 5.60 5.20 4.57*   HGB 16.5 15.5 13.6*   HCT 50.8 47.6 41.8*   MCV 90.7 91.5 91.5   MCH 29.5 29.8 29.8   MCHC 32.5* 32.6* 32.5*   RDW 12.8 13.0 13.1   PLT 97* 108* 115*   MPV 10.8 11.1 10.7      Last 3 CMP:   Recent Labs     21  2125 21  0458 21  0719   * 133* 131*   K 4.8 4.7 5.0   CL 99 100 98   CO2 22 20* 23   BUN 41* 43* 48*   CREATININE 1.4* 1.4* 1.2   GLUCOSE 147* 222* 267*   CALCIUM 8.6* 8.6* 7.8*   PROT 6.3*  --   --    LABALBU 3.6  --   --    BILITOT 0.7  --   --    ALKPHOS 86  --   --    AST 22  --   --    ALT 44*  --   --       Troponin:   Recent Labs     21  2203   TROPONINI <0.01     Calcium:   Lab Results   Component Value Date    CALCIUM 7.8 08/29/2021    CALCIUM 8.6 08/28/2021    CALCIUM 8.6 08/27/2021      Ionized Calcium: No results found for: IONCA   Lipids: No results for input(s): CHOL, HDL in the last 72 hours. Invalid input(s): LDLCALCU  INR:   Recent Labs     08/27/21 2125   INR 0.99     Lactic Acid: No results found for: LACTA     CBC:   Lab Results   Component Value Date    WBC 19.2 08/29/2021    RBC 4.57 08/29/2021    HGB 13.6 08/29/2021    HCT 41.8 08/29/2021    MCV 91.5 08/29/2021    MCH 29.8 08/29/2021    MCHC 32.5 08/29/2021    RDW 13.1 08/29/2021     08/29/2021    MPV 10.7 08/29/2021                 DVT prophylaxis: [] Lovenox                                 [] SCDs                                 [] SQ Heparin                                 [] Encourage ambulation, low risk for DVT, no chemical or mechanical prophylaxis necessary              [x] Already on Anticoagulation      RADIOLOGY:      VL DUP UPPER EXTREMITY ARTERIES LEFT    Result Date: 8/28/2021  Vascular Upper Extremities Arterial Duplex Procedure  Demographics   Patient Name   Luis Fernando Mike     Age                 61                 [facility]   Patient Number [id number]             Gender              Male   Visit Number   [id number]          Interpreting        Haley Gaytan                                    Physician   Date of Birth  1955         Referring Physician Haley Gaytan   Accession      [id number]         [address] [place] [address],  Number                                                RDMS  Procedure Type of Study:   Extremities Arteries:Upper Extremities Arterial Duplex, ARTERIAL SCAN/UPR  F/U LTD. Indications for Study:Limb ischemia / digital ischemia and Pain, left upper extremity. Risk Factors   - The patient's risk factor(s) include: arterial hypertension. Impression   Axillary artery with pre occlusive waveform. Brachial artery occluded at  the upper arm.  Patient has high regurgitation. Aortic valve appears to be tricuspid. Structurally normal aortic valve. No significant aortic regurgitation or stenosis is noted. Tricuspid valve is structurally normal.  No evidence of tricuspid regurgitation. Normal size left atrium. Normal size left atrium. no myxoma  Normal left ventricular size with preserved LV function and an estimated  ejection fraction of approximately 55-60%. No evidence of left ventricular mass or thrombus noted. Normal size left atrium. no myxoma. bubble study negative  Normal right ventricular size with preserved RV function. no thombus or mass  No evidence of significant pericardial effusion is noted. Signature   ----------------------------------------------------------------  Electronically signed by Alexander Hodgkins MD(Interpreting  physician) on 08/28/2021 01:45 PM  ----------------------------------------------------------------   Findings   Mitral Valve  Structurally normal mitral valve with normal leaflet mobility. No evidence  of mitral valve stenosis or mild mitral regurgitation. Aortic Valve  Aortic valve appears to be tricuspid. Structurally normal aortic valve. No significant aortic regurgitation or stenosis is noted. Tricuspid Valve  Tricuspid valve is structurally normal.  No evidence of tricuspid regurgitation. Left Atrium  Normal size left atrium. no myxoma. bubble study negative   Left Ventricle  Normal left ventricular size with preserved LV function and an estimated  ejection fraction of approximately 55-60%. No evidence of left ventricular mass or thrombus noted. Right Atrium  Normal right atrial dimension with no evidence of thrombus or mass noted. Right Ventricle  Normal right ventricular size with preserved RV function. no thombus or mass   Pericardial Effusion  No evidence of significant pericardial effusion is noted. Miscellaneous  Aortic root is within normal limits.   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 Arterial duplex seen and showed radial and ulnar occlusion at the level of proximal to mid forearm despite being therapeutic on anticoagulation. There is patent radial collateral vessel distally. Discussed with patient repeating exploration of the radial artery. Discussed risks and benefits. He agreed. Shweta Real M.D.    Electronically signed 8/29/2021 at 11:10 AM

## 2021-08-29 NOTE — PROGRESS NOTES
Progress Note    Patient name: Archie Collins  Patient : 1955  MR #[id number]  Room: 36    Portions of this note have been copied forward, however, changed to reflect the most current clinical status of this patient. Subjective: POD#1 thrombectomy of the brachial, ulnar and radial arteries on 2021. Feeling well. Has LUE wrapped, states \"arm is a little cool\"    HISTORY OF PRESENT ILLNESS:  Ankit Laws was first seen in hematology consultation as an inpatient by Dr. Celia Paget on 2021. The patient presented from outside hospital with complaints of acute onset coldness and discoloration of the left upper extremity with finger discoloration. He was seen by vascular and found to have acute arterial occlusion. He was taken to the OR on 2021 by vascular for exploration and thrombectomy of the brachial, ulnar and radial arteries. Discussed with vascular. Findings of light fatty appearing thrombus. The patient denies any history of atrial fibrillation or any other cardiac history. He denies any recent procedure arterial.   He has received COVID-19 vaccination with more during the vaccine about 6 weeks ago. I was consulted for findings of thrombocytopenia. His CBC showed WBC count 21,000 with differential showed absolute neutrophil count 18,000, hemoglobin 16.8/MCV 90 and platelet counts 704,008. Patient denies any fever or chills. Denies using steroids recently. Prior CBCs in  showed normal WBC 6.2 with hemoglobin 16.5 and platelet counts 911,448. Patient is currently receiving argatroban. Heparin was avoided due to thrombocytopenia. Objective   PHYSICAL EXAM:  Physical Exam  Vitals reviewed. Constitutional:       General: He is not in acute distress. Appearance: He is well-developed. He is obese. He is not toxic-appearing or diaphoretic. Comments: Wearing a facial mask. HENT:      Head: Normocephalic and atraumatic.       Right Ear: External ear normal.      Left Ear: External ear normal.      Nose: Nose normal.      Mouth/Throat:      Mouth: Mucous membranes are moist.   Eyes:      General: No scleral icterus. Right eye: No discharge. Left eye: No discharge. Conjunctiva/sclera: Conjunctivae normal.   Neck:      Trachea: No tracheal deviation. Cardiovascular:      Rate and Rhythm: Normal rate and regular rhythm. Comments: Left radial pulse present though diminished   Pulmonary:      Effort: Pulmonary effort is normal. No respiratory distress. Breath sounds: Normal breath sounds. No wheezing or rales. Abdominal:      General: Bowel sounds are normal. There is no distension. Palpations: Abdomen is soft. Tenderness: There is no abdominal tenderness. There is no guarding. Genitourinary:     Comments: Exam deferred  Musculoskeletal:         General: No tenderness or deformity. Cervical back: Neck supple. No muscular tenderness. Comments: Normal ROM all four extremities   Lymphadenopathy:      Cervical:      Right cervical: No superficial or deep cervical adenopathy. Left cervical: No superficial or deep cervical adenopathy. Upper Body:      Right upper body: No supraclavicular adenopathy. Left upper body: No supraclavicular adenopathy. Comments:      Skin:     General: Skin is warm and dry. Findings: No rash. Comments: Dressing LUE D/I   Neurological:      Mental Status: He is alert and oriented to person, place, and time. Comments: follows commands, non-focal   Psychiatric:         Behavior: Behavior normal. Behavior is cooperative. Thought Content: Thought content normal.         Judgment: Judgment normal.      Comments: Alert and oriented to person, place and time.        Vital Signs  /71   Pulse 94   Temp 97.2 °F (36.2 °C) (Temporal)   Resp 16   Ht 5' 8\" (1.727 m)   Wt 225 lb 2 oz (102.1 kg)   SpO2 96%   BMI 34.23 kg/m²     Intake/Output Summary (Last 24 hours) at 8/29/2021 0802  Last data filed at 8/29/2021 0725  Gross per 24 hour   Intake 1218.65 ml   Output 3000 ml   Net -1781.35 ml     Labs:  CBC:   Recent Labs     08/28/21  0008 08/28/21  0458 08/29/21  0719   WBC 20.4* 18.3* 19.2*   HGB 16.5 15.5 13.6*   PLT 97* 108* 115*     CMP:   Recent Labs     08/27/21 2125 08/28/21 0458 08/29/21 0719   GLUCOSE 147* 222* 267*   BUN 41* 43* 48*   CREATININE 1.4* 1.4* 1.2   CO2 22 20* 23   CALCIUM 8.6* 8.6* 7.8*   ALKPHOS 86  --   --    AST 22  --   --    ALT 44*  --   --      Hepatic:   Recent Labs     08/27/21 2125   AST 22   ALT 44*   BILITOT 0.7   ALKPHOS 86     Troponin:   Recent Labs     08/27/21 2203   TROPONINI <0.01     BNP: No results for input(s): BNP in the last 72 hours. INR:   Recent Labs     08/27/21 2125   INR 0.99     ABG: No results for input(s): PHART, PMM8ABA, PO2ART, KMV0DNT, B9ITIUIT, BEART in the last 72 hours. 30 Day lookback of cultures:    Blood Culture Recent: No results for input(s): BC in the last 720 hours. Gram Stain Recent: No results for input(s): LABGRAM in the last 720 hours. Resp Culture Recent: No results for input(s): CULTRESP in the last 720 hours. Body Fluid Recent : No results for input(s): BFCX in the last 720 hours. MRSA Recent : No results for input(s): [address] in the last 720 hours. Urine Culture Recent : No results for input(s): LABURIN in the last 720 hours. Organism Recent : No results for input(s): ORG in the last 720 hours. ASSESSMENT/PLAN:  Left upper extremity acute arterial occlusion  Patient denies any history of atrial fibrillation. Pulse is regular. No arrhythmia on tele    8/27/2021- Op note  Findings: thrombus occlusion of brachial artery. High bifurcation of the brachial artery into ulnar, radial and interosseus at the same level. Thrombus occlusion of radial artery that required additional midforearm radial artery exposure. Light fatty appearing thrombus with unknown etiology.  Send for pathology.     Troponin -<0.01      Excerpt from up-to-date: Thromboembolism is the most common cause of acute upper extremity ischemia responsible for 61 percent of cases, and typically affecting older patients. The most common etiologies include atrial fibrillation (51%), valvular heart disease (6%), and isolated ischemic heart disease with left ventricular hypokinesis (4%). Other sources of cardioembolism include left atrial myxoma, left ventricular aneurysm, valvular vegetations in infective endocarditis, or nonbacterial marantic endocarditis. Atherosclerosis of the ascending aorta or arch, innominate or subclavian arteries, or aneurysm affecting the subclavian, axillary (eg, poststenotic, chronic trauma from crutch use, or ulnar artery (eg, hypothenar hammer syndrome can also be sources of embolism     Dr. Jan Montemayor discussed with vascular. Apparently no evidence of aortic aneurysm or aneurysm involving the left upper extremity arterial system. Echo unrevealing for source     Renal impairment improved      Neutrophilic leukocytosis/thrombocytopenia  WBC 19.2, ANC 16.6  Platelets 164,189, improved today    PLAN:  POD#1 thrombectomy of the brachial, ulnar and radial arteries on 8/28/2021 - await pathology  2D echo 8/28/2021: no evidence of LV mass or thrombus. Preserved LV/RV function. EF 55-60%. Bubble study negative  OP Zeal patch x 14 days, per cardiology  Consider MURPHY pending pathology  Continue argatroban  Beta 2 glycoprotein IgG and IgM, cardiolipin antibody IgG and IgM in am  Complete hypercoag work-up as an outpatient    Discussed with patient and spouse.     (Please note that portions of this note were completed with a voice recognition program. Efforts were made to edit the dictations but occasionally words are mis-transcribed.)    Asha Felipe, SANKET  08/29/21  8:02 AM   Physician's attestation/substantial contribution:  I, Dr Tresa Rios, independently performed an evaluation on

## 2021-08-29 NOTE — PROGRESS NOTES
Received notification from clinical [place] that patient was needing to go to surgery. Dr. Tangela Ruiz then notified this nurse that he had another occlusion and she would be taking him back for another thrombectomy and exploration. Consent obtained by patient and placed in soft chart. OR transport here to take patient to surgery.  Electronically signed by Moises Paul RN on 8/29/2021 at 11:30 AM

## 2021-08-29 NOTE — ANESTHESIA PRE PROCEDURE
Department of Anesthesiology  Preprocedure Note       Name:  Roshan Francis   Age:  61 y.o.  :  1955                                          MRN:  [id number]         Date:  2021      Surgeon: Onur Vogel):  Prieto Clarke DO    Procedure: Procedure(s):  WOUND EXPLORATION AND/OR REVISION    Medications prior to admission:   Prior to Admission medications    Medication Sig Start Date End Date Taking? Authorizing Provider   lisinopril (PRINIVIL;ZESTRIL) 20 MG tablet Take 20 mg by mouth daily    Historical Provider, MD   mirtazapine (REMERON) 15 MG tablet Take 45 mg by mouth nightly    Historical Provider, MD   naproxen (NAPROSYN) 375 MG tablet Take 375 mg by mouth 2 times daily (with meals)    Historical Provider, MD   omeprazole (PRILOSEC) 20 MG delayed release capsule Take 20 mg by mouth daily    Historical Provider, MD   hydroCHLOROthiazide (HYDRODIURIL) 25 MG tablet Take 25 mg by mouth daily    Historical Provider, MD       Current medications:    No current facility-administered medications for this visit. No current outpatient medications on file.      Facility-Administered Medications Ordered in Other Visits   Medication Dose Route Frequency Provider Last Rate Last Admin    ceFEPIme (MAXIPIME) 2,000 mg in sterile water 20 mL IV syringe  2,000 mg IntraVENous Q12H Isael Hanna MD        hydroCHLOROthiazide (HYDRODIURIL) tablet 25 mg  25 mg Oral Daily [facility] Ivmaximeoff, DO   25 mg at 21 0859    lisinopril (PRINIVIL;ZESTRIL) tablet 20 mg  20 mg Oral Daily Prieto Clarke, DO   20 mg at 21 0859    mirtazapine (REMERON) tablet 45 mg  45 mg Oral Nightly Prieto Clarke, DO   45 mg at 214    pantoprazole (PROTONIX) tablet 40 mg  40 mg Oral [facility] Ivanukoff, DO   40 mg at 21 0546    0.9 % sodium chloride infusion   IntraVENous Continuous Prieto Clarke,  mL/hr at 21 9401 New Bag at 21 0248    sodium chloride flush 0.9 % injection 5-40 mL  5-40 mL IntraVENous 2 times per day Asha Heads, DO   10 mL at 08/28/21 0824    sodium chloride flush 0.9 % injection 5-40 mL  5-40 mL IntraVENous PRN Asha Heads, DO        0.9 % sodium chloride infusion  25 mL IntraVENous PRN Asha Heads, DO        HYDROcodone-acetaminophen (NORCO) 7.5-325 MG per tablet 1 tablet  1 tablet Oral Q4H PRN Asha Heads, DO        HYDROmorphone HCl PF (DILAUDID) injection 0.5 mg  0.5 mg IntraVENous Q4H PRN Asha Heads, DO        argatroban 250 mg in dextrose 5 % 250 mL infusion  2 mcg/kg/min IntraVENous Continuous Asha Heads, DO 15.4 mL/hr at 08/29/21 0805 2.5 mcg/kg/min at 08/29/21 0805    calcium carbonate (TUMS) chewable tablet 500 mg  500 mg Oral TID PRN Asha Heads, DO   500 mg at 08/29/21 0233    polyethylene glycol (GLYCOLAX) packet 17 g  17 g Oral Daily PRN Asha Heads, DO        melatonin disintegrating tablet 5 mg  5 mg Oral Nightly PRN Asha Heads, DO   5 mg at 08/28/21 2114    naloxone [facility]) injection 0.4 mg  0.4 mg IntraVENous PRN Asha Heads, DO        sodium chloride flush 0.9 % injection 5-40 mL  5-40 mL IntraVENous 2 times per day Asha Heads, DO   10 mL at 08/28/21 1815    sodium chloride flush 0.9 % injection 5-40 mL  5-40 mL IntraVENous PRN Asha Heads, DO        0.9 % sodium chloride infusion  25 mL IntraVENous PRN Asha Heads, DO        ondansetron (ZOFRAN-ODT) disintegrating tablet 4 mg  4 mg Oral Q8H PRN Asha Heads, DO        Or    ondansetron [facility] injection 4 mg  4 mg IntraVENous Q6H PRN Asha Heads, DO           Allergies:  No Known Allergies    Problem List:    Patient Active Problem List   Diagnosis Code    Hypertension I10    Ischemia of left upper extremity I99.8    GERD (gastroesophageal reflux disease), as per medication reconciliation K21.9    Black lung ([address] J60    History of tobacco abuse Z87.891    Arterial embolism and thrombosis of upper extremity (HCC) I74.2       Past Medical History:        Diagnosis Date    Black lung ([address]     GERD (gastroesophageal reflux disease), as per medication reconciliation     History of tobacco abuse     Hypertension     Other chronic back pain     back broken in [place] in [facility]       Past Surgical History:        Procedure Laterality Date    NECK SURGERY Left     \"years ago\" a per report on [id number]       Social History:    Social History     Tobacco Use    Smoking status: Former Smoker     Packs/day: 1.00     Years: 12.00     Pack years: 12.00     Types: Cigarettes     Quit date:      Years since quittin.6    Smokeless tobacco: Never Used    Tobacco comment: smoked from age [age], smoked at 1 [facility], tried 2 cigars only in his life   Substance Use Topics    Alcohol use: Yes     Comment: has about a 12 pack per year                                Counseling given: Not Answered  Comment: smoked from age [age], smoked at 1 [facility], tried 2 cigars only in his life      Vital Signs (Current): There were no vitals filed for this visit.                                            BP Readings from Last 3 Encounters:   21 126/71   21 128/64       NPO Status:                                                                                 BMI:   Wt Readings from Last 3 Encounters:   21 225 lb 2 oz (102.1 kg)     There is no height or weight on file to calculate BMI.    CBC:   Lab Results   Component Value Date    WBC 19.2 2021    RBC 4.57 2021    HGB 13.6 2021    HCT 41.8 2021    MCV 91.5 2021    RDW 13.1 2021     2021       CMP:   Lab Results   Component Value Date     2021    K 5.0 2021    CL 98 2021    CO2 23 2021    BUN 48 2021    CREATININE 1.2 2021    GFRAA >59 2021    LABGLOM >60 2021    GLUCOSE 267 2021    PROT 6.3 2021 CALCIUM 7.8 08/29/2021    BILITOT 0.7 08/27/2021    ALKPHOS 86 08/27/2021    AST 22 08/27/2021    ALT 44 08/27/2021       POC Tests: No results for input(s): POCGLU, POCNA, POCK, POCCL, POCBUN, POCHEMO, POCHCT in the last 72 hours. Coags:   Lab Results   Component Value Date    PROTIME 13.3 08/27/2021    INR 0.99 08/27/2021    APTT 45.0 08/29/2021       HCG (If Applicable): No results found for: PREGTESTUR, PREGSERUM, HCG, HCGQUANT     ABGs: No results found for: PHART, PO2ART, VIA1RHZ, QGD2ACV, BEART, E4LWLUAK     Type & Screen (If Applicable):  No results found for: LABABO, LABRH    Drug/Infectious Status (If Applicable):  No results found for: HIV, HEPCAB    COVID-19 Screening (If Applicable): No results found for: COVID19        Anesthesia Evaluation  Patient summary reviewed and Nursing notes reviewed  Airway: Mallampati: II  TM distance: >3 FB   Neck ROM: full  Mouth opening: > = 3 FB Dental:    (+) lower dentures      Pulmonary:                             ROS comment: Hx Black Lung  Hx Smoking - quit 1/1/90   Cardiovascular:  Exercise tolerance: poor (<4 METS),   (+) hypertension: moderate,          Beta Blocker:  Not on Beta Blocker         Neuro/Psych:                ROS comment: Back Fx from [place] in 1989 --> chronic back pain GI/Hepatic/Renal:   (+) GERD: well controlled,           Endo/Other: Negative Endo/Other ROS             Pt had no PAT visit       Abdominal:             Vascular: negative vascular ROS. Other Findings: Edentulous on top              Anesthesia Plan      general     ASA 3 - emergent       Induction: intravenous. MIPS: Postoperative opioids intended. Anesthetic plan and risks discussed with patient. Use of blood products discussed with patient whom consented to blood products.      Attending anesthesiologist reviewed and agrees with Preprocedure content              SANKET Francois - CRNA   8/29/2021

## 2021-08-29 NOTE — PROGRESS NOTES
Sister     COPD Sister         smoker    COPD Sister         smoker    Cancer Sister     COPD Sister         heavy smoker    Drug Abuse Brother     No Known Problems Daughter     Heart Disease Daughter         congenital from the Mother's side           OBJECTIVE:  /60   Pulse 92   Temp 97.3 °F (36.3 °C) (Temporal)   Resp 14   Ht 5' 8\" (1.727 m)   Wt 225 lb 2 oz (102.1 kg)   SpO2 96%   BMI 34.23 kg/m²   I/O this shift:  In: -   Out: 1700 [Urine:1000;  Blood:700]    PHYSICAL  EXAMINATION:    JANETH:  Awake, alert, oriented x 3, patient appears tired and fatigued   Head/Eyes:  Normocephalic, atraumatic, EOMI and PERRLA bilaterally   Respiratory:   Bilateral fair air entry in both lung fields, mild B/L crackles, symmetric expansion of chest   Cardiovascular:  Regular rate and rhythm, S1+S2+0, no murmurs/rubs   Abdomen:   Soft, non-tender, bowel sounds +ve, no organomegaly   Extremities: Moves all, full range of motion, + left arm in bandage status post vascular surgery intervention   Neurologic: Awake, alert, oriented x 3, cranial nerves II-XII intact, no focal neurological deficits, sensory system intact   Psychiatric: Normal mood, non-suicidal       CURRENT MEDICATIONS:  Scheduled:   [MAR Hold] cefepime  2,000 mg IntraVENous Q12H    [MAR Hold] hydroCHLOROthiazide  25 mg Oral Daily    [MAR Hold] lisinopril  20 mg Oral Daily    [MAR Hold] mirtazapine  45 mg Oral Nightly    [MAR Hold] pantoprazole  40 mg Oral QAM AC    [MAR Hold] sodium chloride flush  5-40 mL IntraVENous 2 times per day    [MAR Hold] sodium chloride flush  5-40 mL IntraVENous 2 times per day        PRN:  [MAR Hold] meperidine, 12.5 mg, Q5 Min PRN  [MAR Hold] HYDROmorphone, 0.25 mg, Q5 Min PRN  [MAR Hold] HYDROmorphone, 0.5 mg, Q5 Min PRN  [MAR Hold] morphine, 2 mg, Q5 Min PRN  [MAR Hold] morphine, 4 mg, Q5 Min PRN  [MAR Hold] promethazine, 6.25 mg, Once PRN  [MAR Hold] metoclopramide, 10 mg, Once PRN  [MAR Hold] diphenhydrAMINE, 12.5 mg, Once PRN  [MAR Hold] labetalol, 5 mg, Q10 Min PRN  [MAR Hold] hydrALAZINE, 5 mg, Q10 Min PRN  [MAR Hold] enalaprilat, 1.25 mg, Once PRN  heparin irrigation, , PRN  papaverine irrigation, , PRN  [MAR Hold] sodium chloride flush, 5-40 mL, PRN  [MAR Hold] sodium chloride, 25 mL, PRN  [MAR Hold] HYDROcodone-acetaminophen, 1 tablet, Q4H PRN  [MAR Hold] HYDROmorphone, 0.5 mg, Q4H PRN  [MAR Hold] calcium carbonate, 500 mg, TID PRN  [MAR Hold] polyethylene glycol, 17 g, Daily PRN  [MAR Hold] melatonin, 5 mg, Nightly PRN  [MAR Hold] naloxone, 0.4 mg, PRN  [MAR Hold] sodium chloride flush, 5-40 mL, PRN  [MAR Hold] sodium chloride, 25 mL, PRN  [MAR Hold] ondansetron, 4 mg, Q8H PRN   Or  [MAR Hold] ondansetron, 4 mg, Q6H PRN        Infusions:   [MAR Hold] sodium chloride 100 mL/hr at 08/28/21 0632    [MAR Hold] sodium chloride      [MAR Hold] argatroban infusion 250 mg in 250 mL 2.5 mcg/kg/min (08/29/21 1212)    [MAR Hold] sodium chloride         Laboratory Data:  Recent Labs     08/28/21  0458 08/29/21  0719 08/29/21  1726   WBC 18.3* 19.2* 22.6*   HGB 15.5 13.6* 12.9*   * 115* 146     Recent Labs     08/27/21 2125 08/28/21  0458 08/29/21  0719   * 133* 131*   K 4.8 4.7 5.0   CL 99 100 98   CO2 22 20* 23   BUN 41* 43* 48*   CREATININE 1.4* 1.4* 1.2   GLUCOSE 147* 222* 267*     Recent Labs     08/27/21 2125   AST 22   ALT 44*   BILITOT 0.7   ALKPHOS 86     Troponin T:   Recent Labs     08/27/21 2203   TROPONINI <0.01     Pro-BNP: No results for input(s): BNP in the last 72 hours. INR:   Recent Labs     08/27/21 2125   INR 0.99     UA:No results for input(s): NITRITE, COLORU, PHUR, LABCAST, WBCUA, RBCUA, MUCUS, TRICHOMONAS, YEAST, BACTERIA, CLARITYU, SPECGRAV, LEUKOCYTESUR, UROBILINOGEN, BILIRUBINUR, BLOODU, GLUCOSEU, AMORPHOUS in the last 72 hours. Invalid input(s): Suzette Joyner  A1C: No results for input(s): LABA1C in the last 72 hours.   ABG:No results for input(s): PHART, PFS8XRQ, PO2ART, GZT8DHM, BEART, HGBAE, E4ZUAIPX, CARBOXHGBART in the last 72 hours. Imaging:    XR CHEST PORTABLE    Result Date: 8/28/2021  1. No radiographic evidence of acute cardiopulmonary process. Signed by Dr Esteban Pea:    Principal Problem:    Arterial embolism and thrombosis of upper extremity ([facility]  Active Problems:    Hypertension    Ischemia of left upper extremity    GERD (gastroesophageal reflux disease), as per medication reconciliation    Black lung ([facility]    History of tobacco abuse  Resolved Problems:    * No resolved hospital problems. *          Patient status post extensive vascular surgery as above . ..... POD # 0 &  POD # 1  Continue with current medications  Continue with the IV argatroban drip as per vascular surgery  Continue with IV hydration  Strict I's and O's  Monitor renal functions  Vascular surgery, cardiology and oncology recommendations reviewed, appreciated and agreed with  Continue with work-up as per specialists  Patient is undergoing hypercoagulable work-up  No preliminary evidence of any myxoma or atherogenic source heart as per cardiology  Follow-up closely with vascular surgery for further treatment recommendations  Patient is having persistent leukocytosis  I ordered Maxipime 2 g IV twice daily for bacterial infection prophylaxis  Patient is being transferred to ICU for higher level of care      Repeat labs in a.m. Electrolyte replacement as per protocol. Patient will be monitored very closely on the floor. Further recommendations as per the hospital course. Discharge Plan: To be determined as per hospital course      Patient  is on DVT prophylaxis  Current medications reviewed  Lab work reviewed  Radiology/Chest x-ray films reviewed  Treatment recommendations from suspecialities reviewed, appreciated and agreed with  Discussed with the nurse and addressed all questions/concerns  Discussed with Patient and/or Family at the bedside in detail . .. they understand and agree with the management plan. Parris Dudley MD  8/29/2021 6:29 PM      DISCLAIMER: This note was created with electronic voice recognition which does have occasional errors. If you have any questions regarding the content within the note please do not hesitate to contact me. .. Thanks.

## 2021-08-29 NOTE — PROGRESS NOTES
[facility], [facility], [address]    DEPARTMENT OF HOSPITALIST MEDICINE      PROGRESS  NOTE:    NOTE: Portions of this note have been copied forward, however, changed to reflect the most current clinical status of this patient. Patient:  Leonora Lala  YOB: 1955  Date of Service: 8/28/2021  MRN: [id number]   Acct: [de-identified]   Primary Care Physician: Yuri Bourgeois  Advance Directive: Full Code  Admit Date: 8/27/2021       Hospital Day: 1      CHIEF COMPLAINT:  No chief complaint on file. SUBJECTIVE:  I saw and examined the patient this morning at bedside. He is feeling better today. Still has some numbness in his left hand. CUMULATIVE  HOSPITAL  COURSE:    8/28/2021  Patient admitted last night and underwent emergent intervention by our vascular surgery team for removal of thrombus from the left arm arterial system. Postop is doing well. Still has some numbness in the left hand and arm. Patient seen and evaluated by cardiology and oncology. Patient undergoing hypercoagulable work-up. He is still on argatroban IV infusion. 8/27/2021  OSH Referring ED Sign Out:  onset at about 18:15 of severe left hand pain and purple color. 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 His PMH is HTN  Sx Hx left neck Sx many years ago, else no surgeries  No Allergies  Lisinopril  Mirtazapine  Naprxoen  Omeprazole  HCTZ  Labs remarkable for WBC 30k  Had diarrhea onset today  COVID test negative  Hb 18.3 HCT 53    DDimer 638  INR 1.0  CMP had , Cr 1.5m, BUN 39 nd all else \"looked good\"  Cardiac enzymes negative  EKG \"SR\"  CXR \"unremarkable\"  Nonsmoker     HPI:  Mr. Pepper Espinal is a pleasant  [place] gentleman of 66 years. He presented to the [facility] Emergency Department for sudden left hand pain with purple color. He was referred to us for an ischemic left upper extremity with threatened hand by Sue Cruz NP.  He had the onset at about 18:15 of severe left hand pain and purple color. 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 He is reported to be a nonsmoker with a PMHx of only HTN diagnosed. He is on Lisinopril, HCTZ, Naproxen, Mirtazapine, and Omeprazole at home. He has no known allergies. His only Sx Hx is of left neck surgery \"years ago\".        REVIEW  OF  SYSTEMS:    Constitutional:  No fevers, chills, nausea, vomiting, + tiredness and fatigue   Lungs:   No hemoptysis, pleurisy, cough, SOB   Heart:  No chest pressure with exertion, palpitations,    Abdomen:   No new masses, no bright red blood per rectum   Extremities: No acute pain while ambulating, no new lesions   Neurologic: No new motor or sensory changes, + numbness left arm and hand after thrombectomy       PAST MEDICAL HISTORY:  Past Medical History:   Diagnosis Date    Black lung ([address]     GERD (gastroesophageal reflux disease), as per medication reconciliation     History of tobacco abuse     Hypertension     Other chronic back pain     back broken in rockfall in mine in [address] [place] [address] Street:  Past Surgical History:   Procedure Laterality Date    NECK SURGERY Left     \"years ago\" a per report on [id number]        FAMILY HISTORY:  Family History   Problem Relation Age of Onset    Heart Disease Mother     Other Mother         tobacco    Heart Disease Father     Other Father         tobacco    Cancer Father     Diabetes Sister     Heart Disease Sister     Other Sister         smoker    Dementia Sister     COPD Sister         smoker    COPD Sister         smoker    Cancer Sister     COPD Sister         heavy smoker    Drug Abuse Brother     No Known Problems Daughter     Heart Disease Daughter         congenital from the Mother's side           OBJECTIVE:  /65   Pulse 87   Temp 97 °F (36.1 °C)   Resp 16   Ht 5' 8\" (1.727 m)   Wt 225 lb 2 oz (102.1 kg)   SpO2 96%   BMI 34.23 kg/m²   I/O this shift:  In: 240 [P.O.:240]  Out: -     PHYSICAL  EXAMINATION:    JANETH:  Awake, alert, oriented x 3, patient appears tired and fatigued   Head/Eyes:  Normocephalic, atraumatic, EOMI and PERRLA bilaterally   Respiratory:   Bilateral fair air entry in both lung fields, mild B/L crackles, symmetric expansion of chest   Cardiovascular:  Regular rate and rhythm, S1+S2+0, no murmurs/rubs   Abdomen:   Soft, non-tender, bowel sounds +ve, no organomegaly   Extremities: Moves all, full range of motion, no edema   Neurologic: Awake, alert, oriented x 3, cranial nerves II-XII intact, no focal neurological deficits, sensory system intact   Psychiatric: Normal mood, non-suicidal       CURRENT MEDICATIONS:  Scheduled:   hydroCHLOROthiazide  25 mg Oral Daily    lisinopril  20 mg Oral Daily    mirtazapine  45 mg Oral Nightly    pantoprazole  40 mg Oral QAM AC    sodium chloride flush  5-40 mL IntraVENous 2 times per day    sodium chloride flush  5-40 mL IntraVENous 2 times per day        PRN:  sodium chloride flush, 5-40 mL, PRN  sodium chloride, 25 mL, PRN  HYDROcodone-acetaminophen, 1 tablet, Q4H PRN  HYDROmorphone, 0.5 mg, Q4H PRN  calcium carbonate, 500 mg, TID PRN  polyethylene glycol, 17 g, Daily PRN  melatonin, 5 mg, Nightly PRN  naloxone, 0.4 mg, PRN  sodium chloride flush, 5-40 mL, PRN  sodium chloride, 25 mL, PRN  ondansetron, 4 mg, Q8H PRN   Or  ondansetron, 4 mg, Q6H PRN        Infusions:   sodium chloride 100 mL/hr at 08/28/21 4071    sodium chloride      argatroban infusion 250 mg in 250 mL 2 mcg/kg/min (08/28/21 1334)    sodium chloride         Laboratory Data:  Recent Labs     08/27/21  2203 08/28/21  0008 08/28/21  0458   WBC 21.0* 20.4* 18.3*   HGB 16.8 16.5 15.5   * 97* 108*     Recent Labs     08/27/21  2125 08/28/21  0458   * 133*   K 4.8 4.7   CL 99 100   CO2 22 20*   BUN 41* 43*   CREATININE 1.4* 1.4*   GLUCOSE 147* 222*     Recent Labs     08/27/21 2125   AST 22   ALT 44*   BILITOT 0.7   ALKPHOS 86     Troponin T: prophylaxis  Current medications reviewed  Lab work reviewed  Radiology/Chest x-ray films reviewed  Treatment recommendations from suspecialities reviewed, appreciated and agreed with  Discussed with the nurse and addressed all questions/concerns  Discussed with Patient and/or Family at the bedside in detail . .. they understand and agree with the management plan. Sun Acosta MD  8/28/2021 10:24 PM      DISCLAIMER: This note was created with electronic voice recognition which does have occasional errors. If you have any questions regarding the content within the note please do not hesitate to contact me. .. Thanks.

## 2021-08-29 NOTE — PROGRESS NOTES
Received update from clinical house - patient still in surgery and will be going to ICU after procedure completed.  Electronically signed by Karina Barajas RN on 8/29/2021 at 4:34 PM

## 2021-08-29 NOTE — BRIEF OP NOTE
Brief Postoperative Note      Patient: Italo Thompson  YOB: 1955  MRN: [id number]    Date of Procedure: 8/29/2021    Pre-Op Diagnosis: left radial and ulnar occlusion    Post-Op Diagnosis: Same       Procedure(s):  ARCH AORTOGRAM,LEFT ANGIOGRAM, EXPLORATION OF BRACHIAL ARTERY WITH THROMBECTOMY OF RADIAL AND ULNAR ARTERIES, LEFT SUBCLAVIAN STENT, Vein patch angioplasty of the brachial artery    Surgeon(s):  Mitch Fay DO    Assistant:  * No surgical staff found *    Anesthesia: General    Estimated Blood Loss (mL): 988    Complications: None    Specimens:   ID Type Source Tests Collected by Time Destination   A : LEFT RADIAL, ULNAR ARTERIES THROMBUS Tissue Arm [facility] 8/29/2021 1708        Implants:  Implant Name Type Inv.  Item Serial No.  Lot No. LRB No. Used Action   STENT PERIPH AD L37MM DIA7MM CATH L135CM BLLN L40MM SHTH  STENT PERIPH AD L37MM DIA7MM CATH L135CM BLLN L40MM [facility]  [email or web address] [id number] Left 1 Implanted         Drains:   Urethral Catheter Double-lumen 16 fr (Active)       Findings: subclavian artery chronic thrombus stenosis, collateralized flow to the hand    Electronically signed by Mitch Fay DO on 8/29/2021 at 5:10 PM

## 2021-08-29 NOTE — ANESTHESIA POSTPROCEDURE EVALUATION
Department of Anesthesiology  Postprocedure Note    Patient: Yenny Agudelo  MRN: [id number]  YOB: 1955  Date of evaluation: 8/29/2021  Time:  5:06 PM     Procedure Summary     Date: 08/29/21 Room / Location: [facility] OR [address]    Anesthesia Start: 1138 Anesthesia Stop: 1706    Procedure: ARCH AORTOGRAM,LEFT ANGIOGRAM, EXPLORATION OF BRACHIAL ARTERY WITH THROMBECTOMY OF RADIAL AND ULNAR ARTERIES, LEFT SUBCLAVIAN STENT (Left ) Diagnosis: (LEFT ARM THROMBUS)    Surgeons: Nataliya Taylor DO Responsible Provider: Radha Gardner MD    Anesthesia Type: general ASA Status: 3 - Emergent          Anesthesia Type: general    Sang Phase I: Sang Score: 9    Sang Phase II:      Last vitals: Reviewed and per EMR flowsheets. Anesthesia Post Evaluation    Patient location during evaluation: bedside  Level of consciousness: awake and alert  Airway patency: patent  Nausea & Vomiting: no nausea and no vomiting  Complications: no  Cardiovascular status: blood pressure returned to baseline  Respiratory status: acceptable and nasal cannula  Hydration status: stable  Comments: Patient transferred to [facility], spontaneous respirations, patent airway, report given to nurse.

## 2021-08-29 NOTE — PROGRESS NOTES
Went to patient's bedside to assess left arm. Unable to palpate radial or ulnar pulses. Hand cool to the touch, no complaints of pain. Thumb and pinky finger dusky on tips but no change since yesterday's assessment. Palm of hand splotchy. Unable to doppler radial pulse. Ulnar pulse took time to find, but found faint +1 pulse to ulnar site. Brachial dopplered +3. Dr. Varinder Quijano notified and arterial ultrasound ordered. Patient's arm wrapped in warm blanket. Will continue to monitor.  Electronically signed by Sampson Shane RN on 8/29/2021 at 8:01 AM

## 2021-08-30 LAB
ANION GAP SERPL CALCULATED.3IONS-SCNC: 7 MMOL/L (ref 7–19)
APTT: 136.6 SEC (ref 26–36.2)
APTT: 150.8 SEC (ref 26–36.2)
APTT: 49.2 SEC (ref 26–36.2)
APTT: 51 SEC (ref 26–36.2)
BASOPHILS ABSOLUTE: 0 K/UL (ref 0–0.2)
BASOPHILS RELATIVE PERCENT: 0.2 % (ref 0–1)
BUN BLDV-MCNC: 46 MG/DL (ref 8–23)
CALCIUM SERPL-MCNC: 7.5 MG/DL (ref 8.8–10.2)
CHLORIDE BLD-SCNC: 99 MMOL/L (ref 98–111)
CO2: 24 MMOL/L (ref 22–29)
CREAT SERPL-MCNC: 1.2 MG/DL (ref 0.5–1.2)
EOSINOPHILS ABSOLUTE: 0 K/UL (ref 0–0.6)
EOSINOPHILS RELATIVE PERCENT: 0 % (ref 0–5)
GFR AFRICAN AMERICAN: >59
GFR NON-AFRICAN AMERICAN: >60
GLUCOSE BLD-MCNC: 181 MG/DL (ref 74–109)
HCT VFR BLD CALC: 33.9 % (ref 42–52)
HEMOGLOBIN: 11 G/DL (ref 14–18)
IMMATURE GRANULOCYTES #: 0.5 K/UL
LYMPHOCYTES ABSOLUTE: 1.4 K/UL (ref 1.1–4.5)
LYMPHOCYTES RELATIVE PERCENT: 6 % (ref 20–40)
MCH RBC QN AUTO: 30 PG (ref 27–31)
MCHC RBC AUTO-ENTMCNC: 32.4 G/DL (ref 33–37)
MCV RBC AUTO: 92.4 FL (ref 80–94)
MONOCYTES ABSOLUTE: 1.8 K/UL (ref 0–0.9)
MONOCYTES RELATIVE PERCENT: 7.7 % (ref 0–10)
NEUTROPHILS ABSOLUTE: 19 K/UL (ref 1.5–7.5)
NEUTROPHILS RELATIVE PERCENT: 83.9 % (ref 50–65)
PDW BLD-RTO: 13 % (ref 11.5–14.5)
PLATELET # BLD: 167 K/UL (ref 130–400)
PMV BLD AUTO: 10.2 FL (ref 9.4–12.4)
POTASSIUM REFLEX MAGNESIUM: 5.5 MMOL/L (ref 3.5–5)
RBC # BLD: 3.67 M/UL (ref 4.7–6.1)
SODIUM BLD-SCNC: 130 MMOL/L (ref 136–145)
WBC # BLD: 22.6 K/UL (ref 4.8–10.8)

## 2021-08-30 PROCEDURE — 2700000000 HC OXYGEN THERAPY PER DAY

## 2021-08-30 PROCEDURE — 6370000000 HC RX 637 (ALT 250 FOR IP): Performed by: HOSPITALIST

## 2021-08-30 PROCEDURE — 36415 COLL VENOUS BLD VENIPUNCTURE: CPT

## 2021-08-30 PROCEDURE — 99232 SBSQ HOSP IP/OBS MODERATE 35: CPT | Performed by: INTERNAL MEDICINE

## 2021-08-30 PROCEDURE — 85730 THROMBOPLASTIN TIME PARTIAL: CPT

## 2021-08-30 PROCEDURE — 85025 COMPLETE CBC W/AUTO DIFF WBC: CPT

## 2021-08-30 PROCEDURE — 2000000000 HC ICU R&B

## 2021-08-30 PROCEDURE — 80048 BASIC METABOLIC PNL TOTAL CA: CPT

## 2021-08-30 PROCEDURE — 94640 AIRWAY INHALATION TREATMENT: CPT

## 2021-08-30 PROCEDURE — 2580000003 HC RX 258: Performed by: SURGERY

## 2021-08-30 PROCEDURE — 87040 BLOOD CULTURE FOR BACTERIA: CPT

## 2021-08-30 PROCEDURE — 93970 EXTREMITY STUDY: CPT

## 2021-08-30 PROCEDURE — 6360000002 HC RX W HCPCS: Performed by: SURGERY

## 2021-08-30 PROCEDURE — 6370000000 HC RX 637 (ALT 250 FOR IP): Performed by: SURGERY

## 2021-08-30 PROCEDURE — 6360000002 HC RX W HCPCS

## 2021-08-30 PROCEDURE — 86146 BETA-2 GLYCOPROTEIN ANTIBODY: CPT

## 2021-08-30 PROCEDURE — 86147 CARDIOLIPIN ANTIBODY EA IG: CPT

## 2021-08-30 RX ORDER — SODIUM POLYSTYRENE SULFONATE 15 G/60ML
30 SUSPENSION ORAL; RECTAL ONCE
Status: DISCONTINUED | OUTPATIENT
Start: 2021-08-30 | End: 2021-09-01 | Stop reason: HOSPADM

## 2021-08-30 RX ORDER — FENTANYL CITRATE 50 UG/ML
50 INJECTION, SOLUTION INTRAMUSCULAR; INTRAVENOUS
Status: DISCONTINUED | OUTPATIENT
Start: 2021-08-30 | End: 2021-09-01 | Stop reason: HOSPADM

## 2021-08-30 RX ORDER — DIPHENHYDRAMINE HYDROCHLORIDE 50 MG/ML
25 INJECTION INTRAMUSCULAR; INTRAVENOUS EVERY 6 HOURS PRN
Status: DISCONTINUED | OUTPATIENT
Start: 2021-08-30 | End: 2021-09-01 | Stop reason: HOSPADM

## 2021-08-30 RX ORDER — IPRATROPIUM BROMIDE AND ALBUTEROL SULFATE 2.5; .5 MG/3ML; MG/3ML
1 SOLUTION RESPIRATORY (INHALATION)
Status: DISCONTINUED | OUTPATIENT
Start: 2021-08-30 | End: 2021-09-01 | Stop reason: HOSPADM

## 2021-08-30 RX ORDER — GABAPENTIN 300 MG/1
300 CAPSULE ORAL 3 TIMES DAILY
Status: DISCONTINUED | OUTPATIENT
Start: 2021-08-30 | End: 2021-09-01 | Stop reason: HOSPADM

## 2021-08-30 RX ORDER — DIPHENHYDRAMINE HYDROCHLORIDE 50 MG/ML
INJECTION INTRAMUSCULAR; INTRAVENOUS
Status: COMPLETED
Start: 2021-08-30 | End: 2021-08-30

## 2021-08-30 RX ADMIN — SODIUM CHLORIDE, PRESERVATIVE FREE 10 ML: 5 INJECTION INTRAVENOUS at 09:55

## 2021-08-30 RX ADMIN — CEFEPIME HYDROCHLORIDE 2000 MG: 2 INJECTION, POWDER, FOR SOLUTION INTRAVENOUS at 09:53

## 2021-08-30 RX ADMIN — FENTANYL CITRATE 50 MCG: 50 INJECTION INTRAMUSCULAR; INTRAVENOUS at 09:49

## 2021-08-30 RX ADMIN — CEFEPIME HYDROCHLORIDE 2000 MG: 2 INJECTION, POWDER, FOR SOLUTION INTRAVENOUS at 21:39

## 2021-08-30 RX ADMIN — SODIUM CHLORIDE, PRESERVATIVE FREE 10 ML: 5 INJECTION INTRAVENOUS at 20:49

## 2021-08-30 RX ADMIN — ARGATROBAN 2 MCG/KG/MIN: 100 INJECTION, SOLUTION INTRAVENOUS at 17:53

## 2021-08-30 RX ADMIN — SODIUM CHLORIDE: 9 INJECTION, SOLUTION INTRAVENOUS at 04:08

## 2021-08-30 RX ADMIN — HYDROCODONE BITARTRATE AND ACETAMINOPHEN 1 TABLET: 7.5; 325 TABLET ORAL at 06:29

## 2021-08-30 RX ADMIN — MIRTAZAPINE 45 MG: 15 TABLET, FILM COATED ORAL at 20:48

## 2021-08-30 RX ADMIN — PANTOPRAZOLE SODIUM 40 MG: 40 TABLET, DELAYED RELEASE ORAL at 05:30

## 2021-08-30 RX ADMIN — HYDROMORPHONE HYDROCHLORIDE 0.5 MG: 1 INJECTION, SOLUTION INTRAMUSCULAR; INTRAVENOUS; SUBCUTANEOUS at 01:16

## 2021-08-30 RX ADMIN — DIPHENHYDRAMINE HYDROCHLORIDE 25 MG: 50 INJECTION, SOLUTION INTRAMUSCULAR; INTRAVENOUS at 12:03

## 2021-08-30 RX ADMIN — HYDROCODONE BITARTRATE AND ACETAMINOPHEN 1 TABLET: 7.5; 325 TABLET ORAL at 11:19

## 2021-08-30 RX ADMIN — HYDROCODONE BITARTRATE AND ACETAMINOPHEN 1 TABLET: 7.5; 325 TABLET ORAL at 16:38

## 2021-08-30 RX ADMIN — GABAPENTIN 300 MG: 300 CAPSULE ORAL at 14:13

## 2021-08-30 RX ADMIN — GABAPENTIN 300 MG: 300 CAPSULE ORAL at 20:48

## 2021-08-30 RX ADMIN — HYDROCHLOROTHIAZIDE 25 MG: 25 TABLET ORAL at 07:56

## 2021-08-30 RX ADMIN — HYDROMORPHONE HYDROCHLORIDE 0.5 MG: 1 INJECTION, SOLUTION INTRAMUSCULAR; INTRAVENOUS; SUBCUTANEOUS at 03:31

## 2021-08-30 RX ADMIN — DIPHENHYDRAMINE HYDROCHLORIDE 25 MG: 50 INJECTION, SOLUTION INTRAMUSCULAR; INTRAVENOUS at 16:38

## 2021-08-30 RX ADMIN — HYDROCODONE BITARTRATE AND ACETAMINOPHEN 1 TABLET: 7.5; 325 TABLET ORAL at 22:30

## 2021-08-30 RX ADMIN — GABAPENTIN 300 MG: 300 CAPSULE ORAL at 11:19

## 2021-08-30 RX ADMIN — DIPHENHYDRAMINE HYDROCHLORIDE 25 MG: 50 INJECTION INTRAMUSCULAR; INTRAVENOUS at 12:03

## 2021-08-30 RX ADMIN — SODIUM CHLORIDE, PRESERVATIVE FREE 10 ML: 5 INJECTION INTRAVENOUS at 09:49

## 2021-08-30 RX ADMIN — Medication 5 MG: at 20:48

## 2021-08-30 RX ADMIN — HYDROMORPHONE HYDROCHLORIDE 0.5 MG: 1 INJECTION, SOLUTION INTRAMUSCULAR; INTRAVENOUS; SUBCUTANEOUS at 05:31

## 2021-08-30 RX ADMIN — FENTANYL CITRATE 50 MCG: 50 INJECTION INTRAMUSCULAR; INTRAVENOUS at 12:04

## 2021-08-30 RX ADMIN — IPRATROPIUM BROMIDE AND ALBUTEROL SULFATE 1 AMPULE: .5; 3 SOLUTION RESPIRATORY (INHALATION) at 19:16

## 2021-08-30 RX ADMIN — POLYETHYLENE GLYCOL 3350 17 G: 17 POWDER, FOR SOLUTION ORAL at 20:48

## 2021-08-30 RX ADMIN — FENTANYL CITRATE 50 MCG: 50 INJECTION INTRAMUSCULAR; INTRAVENOUS at 07:56

## 2021-08-30 ASSESSMENT — PAIN DESCRIPTION - PROGRESSION
CLINICAL_PROGRESSION: GRADUALLY WORSENING

## 2021-08-30 ASSESSMENT — PAIN SCALES - GENERAL
PAINLEVEL_OUTOF10: 2
PAINLEVEL_OUTOF10: 6
PAINLEVEL_OUTOF10: 6
PAINLEVEL_OUTOF10: 10
PAINLEVEL_OUTOF10: 0
PAINLEVEL_OUTOF10: 5
PAINLEVEL_OUTOF10: 8
PAINLEVEL_OUTOF10: 0
PAINLEVEL_OUTOF10: 10
PAINLEVEL_OUTOF10: 2
PAINLEVEL_OUTOF10: 10
PAINLEVEL_OUTOF10: 6
PAINLEVEL_OUTOF10: 5
PAINLEVEL_OUTOF10: 3
PAINLEVEL_OUTOF10: 5
PAINLEVEL_OUTOF10: 3
PAINLEVEL_OUTOF10: 10
PAINLEVEL_OUTOF10: 9

## 2021-08-30 ASSESSMENT — PAIN DESCRIPTION - PAIN TYPE
TYPE: ACUTE PAIN;SURGICAL PAIN
TYPE: SURGICAL PAIN;ACUTE PAIN
TYPE: ACUTE PAIN;SURGICAL PAIN
TYPE: ACUTE PAIN
TYPE: ACUTE PAIN;SURGICAL PAIN
TYPE: SURGICAL PAIN;ACUTE PAIN
TYPE: ACUTE PAIN
TYPE: ACUTE PAIN;SURGICAL PAIN

## 2021-08-30 ASSESSMENT — PAIN - FUNCTIONAL ASSESSMENT
PAIN_FUNCTIONAL_ASSESSMENT: PREVENTS OR INTERFERES SOME ACTIVE ACTIVITIES AND ADLS

## 2021-08-30 ASSESSMENT — PAIN DESCRIPTION - ORIENTATION
ORIENTATION: LEFT

## 2021-08-30 ASSESSMENT — PAIN DESCRIPTION - LOCATION
LOCATION: ARM
LOCATION: ARM
LOCATION: HAND
LOCATION: HAND
LOCATION: ARM
LOCATION: HAND
LOCATION: ARM
LOCATION: HAND
LOCATION: ARM
LOCATION: HAND

## 2021-08-30 ASSESSMENT — PAIN DESCRIPTION - ONSET
ONSET: ON-GOING
ONSET: GRADUAL
ONSET: PROGRESSIVE
ONSET: GRADUAL

## 2021-08-30 ASSESSMENT — PAIN DESCRIPTION - FREQUENCY
FREQUENCY: INTERMITTENT
FREQUENCY: CONTINUOUS
FREQUENCY: CONTINUOUS

## 2021-08-30 ASSESSMENT — PAIN DESCRIPTION - DESCRIPTORS
DESCRIPTORS: ACHING
DESCRIPTORS: ACHING
DESCRIPTORS: ACHING;BURNING;DISCOMFORT;CRUSHING
DESCRIPTORS: ACHING
DESCRIPTORS: SHARP;SHOOTING
DESCRIPTORS: BURNING;DISCOMFORT
DESCRIPTORS: ACHING

## 2021-08-30 NOTE — PROGRESS NOTES
[address] [facility] have no available beds per transfer center. They are calling [facility].

## 2021-08-30 NOTE — CONSULTS
**Physician Signature**  This document was electronically signed by: Montez Callaway MD  2021   10:25 PM    **Consult Information**  Member Facility: [address] [place] [address] MRN: [id number]  Visit/Encounter Number: [id number]  Consult ID: [id number]  Facility Time Zone: CT  Date and Time of Request: 2021 10:02 PM  CT  Requesting Clinician: Abbe De Santiago MD  Patient Name: Zelda Perdomo  Date of Birth:   Gender: Male  Patient identity was confirmed at the beginning of the consult with the   patient/family/staff using two personal identifiers: Patient name and       **Reason for Consult**  Reason for Consult: [facility]    **Admission**  Admission Date: 2021  Chief reason for ICU admission: post vasc surg    **Core Metrics**  General orienting sentence for patient: 66M s/p LUE revasc procedure  Chief physiologic deterioration: None - Stable patient  Is the patient on DVT prophylaxis?: Yes  Is the patient on GI prophylaxis?: Not Indicated  Has this patient reached their nutritional goal?: No and issues are being   addressed  Are there current issues with pain management in this patient?:   No  Are there issues with skin integrity?: No  Are there issues with delirium?: No  Has the patient been mobilized?: No  Is this patient currently intubated?: No  Are there ethical or care philosophy or family issues?: No  Do you recommend an in depth evaluation?: No  Disposition Recommendations: Continue ICU level of care    **Physician Signature**  This document was electronically signed by: Montez Callaway MD  2021   10:25 PM

## 2021-08-30 NOTE — PLAN OF CARE
Problem: Falls - Risk of:  Goal: Will remain free from falls  Description: Will remain free from falls  Outcome: Ongoing  Goal: Absence of physical injury  Description: Absence of physical injury  Outcome: Ongoing     Problem: Pain:  Description: Pain management should include both nonpharmacologic and pharmacologic interventions.   Goal: Pain level will decrease  Description: Pain level will decrease  Outcome: Ongoing  Goal: Control of acute pain  Description: Control of acute pain  Outcome: Ongoing  Goal: Control of chronic pain  Description: Control of chronic pain  Outcome: Ongoing     Problem: Bleeding:  Goal: Will show no signs and symptoms of excessive bleeding  Description: Will show no signs and symptoms of excessive bleeding  Outcome: Ongoing     Problem: Fluid Volume:  Goal: Risk for vascular injury will decrease  Description: Risk for vascular injury will decrease  Outcome: Ongoing     Problem: Physical Regulation:  Goal: Complications related to the disease process, condition or treatment will be avoided or minimized  Description: Complications related to the disease process, condition or treatment will be avoided or minimized  Outcome: Ongoing     Problem: Safety:  Goal: Ability to remain free from injury will improve  Description: Ability to remain free from injury will improve  Outcome: Ongoing

## 2021-08-30 NOTE — PROGRESS NOTES
Pain improved in last 3 fingers of left hand and able to move them slightly better. Pain and numbness still in thumb and pointer finger. He thinks Neurontin has helped the pain the most. No pulse with doppler in radial and ulnar artery still. Brachial is +3 with doppler.

## 2021-08-30 NOTE — PROGRESS NOTES
Progress Note    Patient name: Jane Maloney  Patient : 1955  MR #[id number]  Room: 327    Subjective: Patient was taken to the OR yesterday again. He underwent a aortogram and left angiogram with exploration of the brachial artery with thrombectomy of the radial and ulnar artery. Left subclavian stent. Vein patch angioplasty of the brachial artery. Estimated blood loss 500 cc. Findings of a subclavian artery chronically thrombosed stenosis with collaterals flow to the hand. Continues to complain of significant pain and tingling in the left hand. Discussed bedside MICU nurse. Continue argatroban. HISTORY OF PRESENT ILLNESS:  Aleisha Murrieta was first een in hematology consultation as an inpatient by Dr. Wei Floyd on 2021. The patient presented from outside hospital with complaints of acute onset coldness and discoloration of the left upper extremity with finger discoloration. He was seen by vascular and found to have acute arterial occlusion. He was taken to the OR on 2021 by vascular for exploration and thrombectomy of the brachial, ulnar and radial arteries. Discussed with vascular. Findings of light fatty appearing thrombus. The patient denies any history of atrial fibrillation or any other cardiac history. He denies any recent procedure arterial.   He has received COVID-19 vaccination with more during the vaccine about 6 weeks ago. I was consulted for findings of thrombocytopenia. His CBC showed WBC count 21,000 with differential showed absolute neutrophil count 18,000, hemoglobin 16.8/MCV 90 and platelet counts 129,045. Patient denies any fever or chills. Denies using steroids recently. Prior CBCs in  showed normal WBC 6.2 with hemoglobin 16.5 and platelet counts 183,425. Patient is currently receiving argatroban. Heparin was avoided due to thrombocytopenia. 21-persistent pain left hand.  ARCH AORTOGRAM,LEFT ANGIOGRAM, EXPLORATION OF BRACHIAL ARTERY WITH THROMBECTOMY OF RADIAL AND ULNAR ARTERIES, LEFT SUBCLAVIAN STENT. Vein patch angioplasty of the brachial artery. Findings: subclavian artery chronic thrombus stenosis, collateralized flow to the hand    Objective   PHYSICAL EXAM:  Physical Exam  Vitals reviewed. Constitutional:       General: He is not in acute distress. Appearance: He is well-developed. He is obese. He is not toxic-appearing or diaphoretic. Comments: Wearing a facial mask. HENT:      Head: Normocephalic and atraumatic. Right Ear: External ear normal.      Left Ear: External ear normal.      Nose: Nose normal.      Mouth/Throat:      Mouth: Mucous membranes are moist.   Eyes:      General: No scleral icterus. Right eye: No discharge. Left eye: No discharge. Conjunctiva/sclera: Conjunctivae normal.   Neck:      Trachea: No tracheal deviation. Cardiovascular:      Rate and Rhythm: Normal rate and regular rhythm. Comments: Left radial pulse present though diminished   Pulmonary:      Effort: Pulmonary effort is normal. No respiratory distress. Breath sounds: Normal breath sounds. No wheezing or rales. Abdominal:      General: Bowel sounds are normal. There is no distension. Palpations: Abdomen is soft. Tenderness: There is no abdominal tenderness. There is no guarding. Genitourinary:     Comments: Exam deferred  Musculoskeletal:         General: No tenderness or deformity. Cervical back: Neck supple. No muscular tenderness. Comments: Normal ROM all four extremities   Lymphadenopathy:      Cervical:      Right cervical: No superficial or deep cervical adenopathy. Left cervical: No superficial or deep cervical adenopathy. Upper Body:      Right upper body: No supraclavicular adenopathy. Left upper body: No supraclavicular adenopathy. Comments:      Skin:     General: Skin is warm and dry. Findings: No rash.       Comments: Dressing LUE D/I Neurological:      Mental Status: He is alert and oriented to person, place, and time. Comments: follows commands, non-focal   Psychiatric:         Behavior: Behavior normal. Behavior is cooperative. Thought Content: Thought content normal.         Judgment: Judgment normal.      Comments: Alert and oriented to person, place and time. Vital Signs  BP (!) 144/64   Pulse 98   Temp 98.4 °F (36.9 °C) (Temporal)   Resp 11   Ht 5' 8\" (1.727 m)   Wt 236 lb 12.8 oz (107.4 kg)   SpO2 91%   BMI 36.01 kg/m²     Intake/Output Summary (Last 24 hours) at 8/30/2021 0808  Last data filed at 8/30/2021 0600  Gross per 24 hour   Intake 3689.33 ml   Output 4475 ml   Net -785.67 ml     Labs:  CBC:   Recent Labs     08/29/21  0719 08/29/21  1726 08/30/21  0608   WBC 19.2* 22.6* 22.6*   HGB 13.6* 12.9* 11.0*   * 146 167     CMP:   Recent Labs     08/27/21 2125 08/27/21 2125 08/28/21  0458 08/29/21  0719 08/30/21  0608   GLUCOSE 147*   < > 222* 267* 181*   BUN 41*   < > 43* 48* 46*   CREATININE 1.4*   < > 1.4* 1.2 1.2   CO2 22   < > 20* 23 24   CALCIUM 8.6*   < > 8.6* 7.8* 7.5*   ALKPHOS 86  --   --   --   --    AST 22  --   --   --   --    ALT 44*  --   --   --   --     < > = values in this interval not displayed. Hepatic:   Recent Labs     08/27/21 2125   AST 22   ALT 44*   BILITOT 0.7   ALKPHOS 86     Troponin:   Recent Labs     08/27/21 2203   TROPONINI <0.01     INR:   Recent Labs     08/27/21 2125   INR 0.99     ASSESSMENT/PLAN:  Left upper extremity acute arterial occlusion  Patient denies any history of atrial fibrillation. Pulse is regular. No arrhythmia on tele  8/27/2021- Op note  Findings: thrombus occlusion of brachial artery. High bifurcation of the brachial artery into ulnar, radial and interosseus at the same level. Thrombus occlusion of radial artery that required additional midforearm radial artery exposure. Light fatty appearing thrombus with unknown etiology.  Send for pathology.   Troponin -<0.01    Excerpt from up-to-date: Thromboembolism is the most common cause of acute upper extremity ischemia responsible for 61 percent of cases, and typically affecting older patients. The most common etiologies include atrial fibrillation (51%), valvular heart disease (6%), and isolated ischemic heart disease with left ventricular hypokinesis (4%). Other sources of cardioembolism include left atrial myxoma, left ventricular aneurysm, valvular vegetations in infective endocarditis, or nonbacterial marantic endocarditis. Atherosclerosis of the ascending aorta or arch, innominate or subclavian arteries, or aneurysm affecting the subclavian, axillary (eg, poststenotic, chronic trauma from crutch use, or ulnar artery (eg, hypothenar hammer syndrome can also be sources of embolism     Dr. Quang Morgan discussed with vascular. Apparently no evidence of aortic aneurysm or aneurysm involving the left upper extremity arterial system. Echo unrevealing for source  2D echo 8/28/2021: no evidence of LV mass or thrombus. Preserved LV/RV function. EF 55-60%. Bubble study negative  OP Zeal patch x 14 days, per cardiology     Renal impairment- improved      Neutrophilic leukocytosis- will need further   WBC 22.6, ANC 16.6->11  Platelets 102,491, improved today    PLAN:  Continue argatroban  Beta 2 glycoprotein IgG and IgM, cardiolipin antibody IgG and IgM in process  Complete hypercoag work-up as an outpatient  Outpatient [facility] and [facility]  Discussed with vascular. Suggest consideration of transfer to tertiary center.     Discussed with patien    (Please note that portions of this note were completed with a voice recognition program. Efforts were made to edit the dictations but occasionally words are mis-transcribed.)    Alex Zurita MD  08/30/21  8:08 AM

## 2021-08-30 NOTE — CONSULTS
Orthopaedic Inpatient Consultation    NAME:  Nomi Lester   :   MRN: [id number]    2021  9:14 PM    Requesting Physician: Dr. Jacy Pantoja: Left hand/arm pain with numbness and tingling into the hand      HISTORY OF PRESENT ILLNESS:   Mr. Astrid Chin is a right-hand-dominant 80-year-old gentleman who was admitted to the hospital on 2021 with acute ischemia of the left upper extremity. He was taken emergently to the operating room with vascular surgery and underwent exploration and thrombectomy of the brachial, ulnar and radial arteries. The following day, an arterial duplex revealed more distal radial and ulnar artery occlusion near the mid forearm and he returned to the OR for exploration of the brachial artery with thrombectomy of radial and ulnar arteries, stent placement into the left subclavian and a vein patch angioplasty of the brachial artery. Due to persistent numbness and tingling to the left hand, I was consulted for evaluation of the need for a carpal tunnel release and/or hand fasciotomies. On interview, patient was asleep but easily arousable. He reports a constant level of throbbing pain rated 3/10. He reports numbness and tingling to the thumb, index and middle fingers. Denies numbness or tingling to the ring or small fingers. States that he is unable to make a composite fist due to stiffness. With active finger and wrist range of motion he reports minimal, \"incisional\" pain.     Past Medical History:        Diagnosis Date    Black lung ([address]     GERD (gastroesophageal reflux disease), as per medication reconciliation     History of tobacco abuse     Hypertension     Other chronic back pain     back broken in [employer] in mine in [address] [facility] [employer]       Past Surgical History:        Procedure Laterality Date    NECK SURGERY Left     \"years ago\" a per report on     WOUND EXPLORATION Left 2021    ARCH AORTOGRAM,LEFT ANGIOGRAM, EXPLORATION Comment: 2 daughters   Other Topics Concern    Not on file   Social History Narrative    CODE STATUS: Full Code    HEALTH CARE PROXY: Mrs. Brice Bence, his wife, [phone]    AMBULATES: independently normally    DOMICILED: lives in a private home, lives alone with his wife, has a yorkie, no stairs in the home     Social Determinants of Health     Financial Resource Strain:     Difficulty of Paying Living Expenses:    Food Insecurity:     Worried About Running Out of Food in the Last Year:     [address] in the Last Year:    Transportation Needs:     Lack of Transportation (Medical):  Lack of Transportation (Non-Medical):    Physical Activity:     Days of Exercise per Week:     Minutes of Exercise per Session:    Stress:     Feeling of Stress :    Social Connections:     Frequency of Communication with Friends and Family:     Frequency of Social Gatherings with Friends and Family:     Attends Temple Services:     Active Member of Clubs or Organizations:     Attends Club or Organization Meetings:     Marital Status:    Intimate Partner Violence:     Fear of Current or Ex-Partner:     Emotionally Abused:     Physically Abused:     Sexually Abused:        Family History:   Family History   Problem Relation Age of Onset    Heart Disease Mother     Other Mother         tobacco    Heart Disease Father     Other Father         tobacco    Cancer Father     Diabetes Sister     Heart Disease Sister     Other Sister         smoker    Dementia Sister     COPD Sister         smoker    COPD Sister         smoker    Cancer Sister     COPD Sister         heavy smoker    Drug Abuse Brother     No Known Problems Daughter     Heart Disease Daughter         congenital from the Mother's side       REVIEW OF SYSTEMS:  14 point review of systems has been reviewed from the patient's emergency room visit, reviewed with the patient on today's date with no new changes.     PHYSICAL EXAM: extremity exam:  There is no tenderness to palpation about the hip, knee, ankle or foot. Unrestricted full motion is present. Stability is normal with provocative tests, 5/5 strength, and skin is normal.     Left lower extremity exam:  There is no tenderness to palpation about the hip, knee, ankle or foot. Unrestricted full motion is present. Stability is normal with provocative tests, 5/5 strength, and skin is normal.      DATA:    CBC with Differential:    Lab Results   Component Value Date    WBC 22.6 08/30/2021    RBC 3.67 08/30/2021    HGB 11.0 08/30/2021    HCT 33.9 08/30/2021     08/30/2021    MCV 92.4 08/30/2021    MCH 30.0 08/30/2021    MCHC 32.4 08/30/2021    RDW 13.0 08/30/2021    LYMPHOPCT 6.0 08/30/2021    MONOPCT 7.7 08/30/2021    BASOPCT 0.2 08/30/2021    MONOSABS 1.80 08/30/2021    LYMPHSABS 1.4 08/30/2021    EOSABS 0.00 08/30/2021    BASOSABS 0.00 08/30/2021     CMP:    Lab Results   Component Value Date     08/30/2021    K 5.5 08/30/2021    CL 99 08/30/2021    CO2 24 08/30/2021    BUN 46 08/30/2021    CREATININE 1.2 08/30/2021    GFRAA >59 08/30/2021    LABGLOM >60 08/30/2021    GLUCOSE 181 08/30/2021    PROT 6.3 08/27/2021    CALCIUM 7.5 08/30/2021    BILITOT 0.7 08/27/2021    ALKPHOS 86 08/27/2021    AST 22 08/27/2021    ALT 44 08/27/2021     BMP:    Lab Results   Component Value Date     08/30/2021    K 5.5 08/30/2021    CL 99 08/30/2021    CO2 24 08/30/2021    BUN 46 08/30/2021    CREATININE 1.2 08/30/2021    CALCIUM 7.5 08/30/2021    GFRAA >59 08/30/2021    LABGLOM >60 08/30/2021    GLUCOSE 181 08/30/2021         Radiology: I have reviewed the radiology images listed below and agree with the findings of the interpreting radiologist(s).      VL DUP UPPER EXTREMITY ARTERIES LEFT    Result Date: 8/28/2021  Vascular Upper Extremities Arterial Duplex Procedure  Demographics   Patient Name   Luis Fernando Mike     Age                 61                 [facility]   Patient Number [id number]             Gender              Male   Visit Number   [id number]          Interpreting        Cintia De Souza                                    Physician   Date of Birth  1955         Referring Physician Cintia De Souza   Accession      [id number]         [address] [facility], [facility],  Number                                                RDMS  Procedure Type of Study:   Extremities Arteries:Upper Extremities Arterial Duplex, ARTERIAL SCAN/UPR  F/U LTD. Indications for Study:Limb ischemia / digital ischemia and Pain, left upper extremity. Risk Factors   - The patient's risk factor(s) include: arterial hypertension. Impression   Axillary artery with pre occlusive waveform. Brachial artery occluded at  the upper arm. Patient has high bifurcation at the upper arm. There is no  flow seen in the ulnar and radial arteries. Subclavian artery appears without aneurysmal dilatation. Signature   ----------------------------------------------------------------  Electronically signed by Andres Do(Interpreting  physician) on 08/28/2021 11:22 AM  ----------------------------------------------------------------  Velocities are measured in cm/s ; Diameters are measured in mm Left Upper Extremities Duplex Measurements +------------------------+----+-----+---+--------+-------------------------+ ! Location                ! PSV ! Ratio! EDV! Diameter! Wave Description         ! +------------------------+----+-----+---+--------+-------------------------+ ! Prox Subclavian         ! 146 !     !   !        !                         ! +------------------------+----+-----+---+--------+-------------------------+ ! Dist Subclavian         !69  !0.47 !   !        !                         ! +------------------------+----+-----+---+--------+-------------------------+ ! Axillary                ! 53.9!     !   !        !                         ! +------------------------+----+-----+---+--------+-------------------------+ ! Prox Brachial           !12.7!0.18 !   !        !                         ! +------------------------+----+-----+---+--------+-------------------------+ ! Prox Radial             !0   !0    !   !        !                         ! +------------------------+----+-----+---+--------+-------------------------+ ! Mid Radial              !0   !     !   !        !                         ! +------------------------+----+-----+---+--------+-------------------------+ ! Dist Radial             !0   !     !   !        !                         ! +------------------------+----+-----+---+--------+-------------------------+ ! Prox Ulnar              !0   !0    !   !        !                         ! +------------------------+----+-----+---+--------+-------------------------+ ! Mid Ulnar               !0   !     !   !        !                         ! +------------------------+----+-----+---+--------+-------------------------+ ! Dist Ulnar              !0   !     !0  !        !                         ! +------------------------+----+-----+---+--------+-------------------------+    XR CHEST PORTABLE    Result Date: 8/28/2021  XR CHEST PORTABLE 8/27/2021 10:15 PM HISTORY: Blue arm COMPARISON: None. FINDINGS: No lung consolidation. No pleural effusion or pneumothorax. The cardiomediastinal silhouette and pulmonary vascularity are within normal limits. The osseous structures and surrounding soft tissues demonstrate no acute abnormality. 1. No radiographic evidence of acute cardiopulmonary process.  Signed by Dr Rachel Garcia    ECHO 2D [id number]    Result Date: 8/28/2021  Transthoracic Echocardiography Report (TTE)  Demographics   Patient Name Christiane Pompa      Date of Study        08/28/2021               Rao Thibodeaux   MRN          [id number]              Gender               Male   Date of      1955          Room Number          AUN-0047 Birth   Age          61 year(s)   Height:      68 inches           Referring Physician  Carlos Garcia   Weight:      225 pounds          Sonographer          [facility]   BSA:         2.15 m^2            Interpreting         Otilio Justice MD                                   Physician   BMI:         34.21 kg/m^2  Procedure Type of Study   TTE procedure:ECHO 2D W/DOPPLER/COLOR/CONTRAST. Study Location: Echo Lab Technical Quality: Limited visualization due to body habitus. Patient Status: Inpatient Contrast Medium: Definity. Amount - 4 ml BP: 160/65 mmHg Indications:Left Ventricular Dysfunction. Conclusions   Summary  Structurally normal mitral valve with normal leaflet mobility. No evidence  of mitral valve stenosis or mild mitral regurgitation. Aortic valve appears to be tricuspid. Structurally normal aortic valve. No significant aortic regurgitation or stenosis is noted. Tricuspid valve is structurally normal.  No evidence of tricuspid regurgitation. Normal size left atrium. Normal size left atrium. no myxoma  Normal left ventricular size with preserved LV function and an estimated  ejection fraction of approximately 55-60%. No evidence of left ventricular mass or thrombus noted. Normal size left atrium. no myxoma. bubble study negative  Normal right ventricular size with preserved RV function. no thombus or mass  No evidence of significant pericardial effusion is noted. Signature   ----------------------------------------------------------------  Electronically signed by Otilio Justice MD(Interpreting  physician) on 08/28/2021 01:45 PM  ----------------------------------------------------------------   Findings   Mitral Valve  Structurally normal mitral valve with normal leaflet mobility. No evidence  of mitral valve stenosis or mild mitral regurgitation. Aortic Valve  Aortic valve appears to be tricuspid. Structurally normal aortic valve.   No significant aortic regurgitation remained stable. Despite inability to obtain palpable or dopplerable arterial pulses at the wrist or digital arteries, clinically, his hand appears to be perfusing which I discussed with Dr. Kiara Askew with her that I again do not think that outflow is the issue but have more concern with inflow based on his examination and review of intraoperative angiogram images. She feels comfortable that inflow has been corrected. She is going to discuss patient with tertiary care colleagues. I will continue to follow, could consider carpal tunnel release if he is not transferred- discussed with patient and Dr. Charmayne Ash that paresthesias could be related to traction on median nerve in previous surgeries.      Electronically signed by Reji Barker MD on 8/30/2021 at 8:51 AM

## 2021-08-30 NOTE — PROGRESS NOTES
Received pt from PACU at 1838. Pt's upper dentures were at bedside and pt placed in mouth. Pt had asked about his cell phone and his hearing aids. Phoned pt's wife and she advised that she took both his hearing aids and cell phone with her.     Electronically signed by Narda oMore RN on 8/29/2021 at 8:00 PM

## 2021-08-30 NOTE — PROGRESS NOTES
Vascular Preliminary Report      Bilateral Lower Extremity Venous Duplex Completed. No evidence of DVT or SVTnoted at this time. Reflux noted within the Right Common Femoral Vein only at this time. Final Report Pending.

## 2021-08-30 NOTE — PROGRESS NOTES
Patient wife updated and asked to bring patient's cellphone in for him at ER. Explained he wouldn't be allowed visitors till moved out of ICU.

## 2021-08-30 NOTE — PLAN OF CARE
Problem: Falls - Risk of:  Goal: Will remain free from falls  Description: Will remain free from falls  8/30/2021 1727 by Tiffanie Reyes RN  Outcome: Ongoing  8/30/2021 1727 by Tiffanie Reyes RN  Outcome: Ongoing  8/30/2021 0433 by Linnette Perez RN  Outcome: Ongoing  Goal: Absence of physical injury  Description: Absence of physical injury  8/30/2021 1727 by Tiffanie Reyes RN  Outcome: Ongoing  8/30/2021 0433 by Linnette Perez RN  Outcome: Ongoing     Problem: Pain:  Goal: Pain level will decrease  Description: Pain level will decrease  8/30/2021 1727 by Tiffanie Reyes RN  Outcome: Ongoing  8/30/2021 0433 by Linnette Perez RN  Outcome: Ongoing  Goal: Control of acute pain  Description: Control of acute pain  8/30/2021 1727 by Tiffanie Reyes RN  Outcome: Ongoing  8/30/2021 0433 by Linnette Perez RN  Outcome: Ongoing  Goal: Control of chronic pain  Description: Control of chronic pain  8/30/2021 1727 by Tiffanie Reyes RN  Outcome: Ongoing  8/30/2021 0433 by Linnette Perez RN  Outcome: Ongoing     Problem: Bleeding:  Goal: Will show no signs and symptoms of excessive bleeding  Description: Will show no signs and symptoms of excessive bleeding  8/30/2021 1727 by Tiffanie Reyes RN  Outcome: Ongoing  8/30/2021 0433 by Linnette Perez RN  Outcome: Ongoing     Problem: Fluid Volume:  Goal: Risk for vascular injury will decrease  Description: Risk for vascular injury will decrease  8/30/2021 1727 by Tiffanie Reyes RN  Outcome: Ongoing  8/30/2021 0433 by Linnette Perez RN  Outcome: Ongoing     Problem: Physical Regulation:  Goal: Complications related to the disease process, condition or treatment will be avoided or minimized  Description: Complications related to the disease process, condition or treatment will be avoided or minimized  8/30/2021 1727 by Tiffanie Reyes RN  Outcome: Ongoing  8/30/2021 0433 by Linnette Perez RN  Outcome: Ongoing     Problem: Safety:  Goal: Ability to remain free from injury will improve  Description: Ability to remain free from injury will improve  8/30/2021 1727 by Tiffanie Murcia RN  Outcome: Ongoing  8/30/2021 0433 by Loida Nichols RN  Outcome: Ongoing

## 2021-08-31 LAB
ANION GAP SERPL CALCULATED.3IONS-SCNC: 14 MMOL/L (ref 7–19)
APTT: 149.3 SEC (ref 26–36.2)
APTT: 174.1 SEC (ref 26–36.2)
APTT: 49.1 SEC (ref 26–36.2)
APTT: 53.4 SEC (ref 26–36.2)
BASOPHILS ABSOLUTE: 0 K/UL (ref 0–0.2)
BASOPHILS RELATIVE PERCENT: 0.1 % (ref 0–1)
BUN BLDV-MCNC: 44 MG/DL (ref 8–23)
CALCIUM SERPL-MCNC: 7.1 MG/DL (ref 8.8–10.2)
CHLORIDE BLD-SCNC: 88 MMOL/L (ref 98–111)
CO2: 22 MMOL/L (ref 22–29)
CREAT SERPL-MCNC: 1.1 MG/DL (ref 0.5–1.2)
EOSINOPHILS ABSOLUTE: 0 K/UL (ref 0–0.6)
EOSINOPHILS RELATIVE PERCENT: 0 % (ref 0–5)
FERRITIN: 303.4 NG/ML (ref 30–400)
GFR AFRICAN AMERICAN: >59
GFR NON-AFRICAN AMERICAN: >60
GLUCOSE BLD-MCNC: 193 MG/DL (ref 74–109)
HCT VFR BLD CALC: 28.8 % (ref 42–52)
HEMOGLOBIN: 9.7 G/DL (ref 14–18)
IMMATURE GRANULOCYTES #: 0.3 K/UL
IRON SATURATION: 51 % (ref 14–50)
IRON: 89 UG/DL (ref 59–158)
LYMPHOCYTES ABSOLUTE: 1.5 K/UL (ref 1.1–4.5)
LYMPHOCYTES RELATIVE PERCENT: 8.8 % (ref 20–40)
MCH RBC QN AUTO: 29.7 PG (ref 27–31)
MCHC RBC AUTO-ENTMCNC: 33.7 G/DL (ref 33–37)
MCV RBC AUTO: 88.1 FL (ref 80–94)
MONOCYTES ABSOLUTE: 1.9 K/UL (ref 0–0.9)
MONOCYTES RELATIVE PERCENT: 11.1 % (ref 0–10)
NEUTROPHILS ABSOLUTE: 13.3 K/UL (ref 1.5–7.5)
NEUTROPHILS RELATIVE PERCENT: 78.1 % (ref 50–65)
PDW BLD-RTO: 12.4 % (ref 11.5–14.5)
PLATELET # BLD: 140 K/UL (ref 130–400)
PMV BLD AUTO: 10.3 FL (ref 9.4–12.4)
POTASSIUM REFLEX MAGNESIUM: 4.8 MMOL/L (ref 3.5–5)
RBC # BLD: 3.27 M/UL (ref 4.7–6.1)
SODIUM BLD-SCNC: 124 MMOL/L (ref 136–145)
TOTAL IRON BINDING CAPACITY: 175 UG/DL (ref 250–400)
WBC # BLD: 17.1 K/UL (ref 4.8–10.8)

## 2021-08-31 PROCEDURE — 36415 COLL VENOUS BLD VENIPUNCTURE: CPT

## 2021-08-31 PROCEDURE — 94640 AIRWAY INHALATION TREATMENT: CPT

## 2021-08-31 PROCEDURE — 82728 ASSAY OF FERRITIN: CPT

## 2021-08-31 PROCEDURE — 2000000000 HC ICU R&B

## 2021-08-31 PROCEDURE — 6370000000 HC RX 637 (ALT 250 FOR IP): Performed by: HOSPITALIST

## 2021-08-31 PROCEDURE — 99232 SBSQ HOSP IP/OBS MODERATE 35: CPT | Performed by: INTERNAL MEDICINE

## 2021-08-31 PROCEDURE — 84295 ASSAY OF SERUM SODIUM: CPT

## 2021-08-31 PROCEDURE — 83550 IRON BINDING TEST: CPT

## 2021-08-31 PROCEDURE — 85025 COMPLETE CBC W/AUTO DIFF WBC: CPT

## 2021-08-31 PROCEDURE — 99024 POSTOP FOLLOW-UP VISIT: CPT | Performed by: NURSE PRACTITIONER

## 2021-08-31 PROCEDURE — 83540 ASSAY OF IRON: CPT

## 2021-08-31 PROCEDURE — 85730 THROMBOPLASTIN TIME PARTIAL: CPT

## 2021-08-31 PROCEDURE — 2500000003 HC RX 250 WO HCPCS: Performed by: SURGERY

## 2021-08-31 PROCEDURE — 6370000000 HC RX 637 (ALT 250 FOR IP): Performed by: SURGERY

## 2021-08-31 PROCEDURE — 6360000002 HC RX W HCPCS: Performed by: SURGERY

## 2021-08-31 PROCEDURE — 2700000000 HC OXYGEN THERAPY PER DAY

## 2021-08-31 PROCEDURE — 80048 BASIC METABOLIC PNL TOTAL CA: CPT

## 2021-08-31 PROCEDURE — 2580000003 HC RX 258: Performed by: HOSPITALIST

## 2021-08-31 PROCEDURE — 2580000003 HC RX 258: Performed by: SURGERY

## 2021-08-31 RX ORDER — POLYETHYLENE GLYCOL 3350 17 G/17G
17 POWDER, FOR SOLUTION ORAL 2 TIMES DAILY
Status: DISCONTINUED | OUTPATIENT
Start: 2021-08-31 | End: 2021-09-01 | Stop reason: HOSPADM

## 2021-08-31 RX ORDER — MIRTAZAPINE 15 MG/1
45 TABLET, ORALLY DISINTEGRATING ORAL NIGHTLY
Status: DISCONTINUED | OUTPATIENT
Start: 2021-08-31 | End: 2021-09-01 | Stop reason: HOSPADM

## 2021-08-31 RX ORDER — SODIUM CHLORIDE 9 MG/ML
INJECTION, SOLUTION INTRAVENOUS CONTINUOUS
Status: DISCONTINUED | OUTPATIENT
Start: 2021-08-31 | End: 2021-08-31

## 2021-08-31 RX ADMIN — CEFEPIME HYDROCHLORIDE 2000 MG: 2 INJECTION, POWDER, FOR SOLUTION INTRAVENOUS at 21:33

## 2021-08-31 RX ADMIN — SODIUM CHLORIDE, PRESERVATIVE FREE 10 ML: 5 INJECTION INTRAVENOUS at 21:45

## 2021-08-31 RX ADMIN — IPRATROPIUM BROMIDE AND ALBUTEROL SULFATE 1 AMPULE: .5; 3 SOLUTION RESPIRATORY (INHALATION) at 06:19

## 2021-08-31 RX ADMIN — IPRATROPIUM BROMIDE AND ALBUTEROL SULFATE 1 AMPULE: .5; 3 SOLUTION RESPIRATORY (INHALATION) at 10:51

## 2021-08-31 RX ADMIN — FENTANYL CITRATE 50 MCG: 50 INJECTION INTRAMUSCULAR; INTRAVENOUS at 23:18

## 2021-08-31 RX ADMIN — POLYETHYLENE GLYCOL 3350 17 G: 17 POWDER, FOR SOLUTION ORAL at 07:43

## 2021-08-31 RX ADMIN — GABAPENTIN 300 MG: 300 CAPSULE ORAL at 05:30

## 2021-08-31 RX ADMIN — MIRTAZAPINE 45 MG: 15 TABLET, ORALLY DISINTEGRATING ORAL at 21:12

## 2021-08-31 RX ADMIN — CEFEPIME HYDROCHLORIDE 2000 MG: 2 INJECTION, POWDER, FOR SOLUTION INTRAVENOUS at 09:38

## 2021-08-31 RX ADMIN — ARGATROBAN 2.5 MCG/KG/MIN: 100 INJECTION, SOLUTION INTRAVENOUS at 21:49

## 2021-08-31 RX ADMIN — IPRATROPIUM BROMIDE AND ALBUTEROL SULFATE 1 AMPULE: .5; 3 SOLUTION RESPIRATORY (INHALATION) at 18:22

## 2021-08-31 RX ADMIN — PANTOPRAZOLE SODIUM 40 MG: 40 TABLET, DELAYED RELEASE ORAL at 05:30

## 2021-08-31 RX ADMIN — HYDROCODONE BITARTRATE AND ACETAMINOPHEN 1 TABLET: 7.5; 325 TABLET ORAL at 21:39

## 2021-08-31 RX ADMIN — SODIUM CHLORIDE: 9 INJECTION, SOLUTION INTRAVENOUS at 16:44

## 2021-08-31 RX ADMIN — HYDROCODONE BITARTRATE AND ACETAMINOPHEN 1 TABLET: 7.5; 325 TABLET ORAL at 09:39

## 2021-08-31 RX ADMIN — Medication 5 MG: at 21:09

## 2021-08-31 RX ADMIN — HYDROCODONE BITARTRATE AND ACETAMINOPHEN 1 TABLET: 7.5; 325 TABLET ORAL at 05:31

## 2021-08-31 RX ADMIN — LISINOPRIL 20 MG: 20 TABLET ORAL at 18:37

## 2021-08-31 RX ADMIN — POLYETHYLENE GLYCOL 3350 17 G: 17 POWDER, FOR SOLUTION ORAL at 21:12

## 2021-08-31 RX ADMIN — METOPROLOL TARTRATE 25 MG: 25 TABLET, FILM COATED ORAL at 21:09

## 2021-08-31 RX ADMIN — HYDROCHLOROTHIAZIDE 25 MG: 25 TABLET ORAL at 07:43

## 2021-08-31 RX ADMIN — NITROGLYCERIN 40 MCG/MIN: 20 INJECTION INTRAVENOUS at 21:54

## 2021-08-31 RX ADMIN — GABAPENTIN 300 MG: 300 CAPSULE ORAL at 21:09

## 2021-08-31 RX ADMIN — HYDROCODONE BITARTRATE AND ACETAMINOPHEN 1 TABLET: 7.5; 325 TABLET ORAL at 16:44

## 2021-08-31 RX ADMIN — IPRATROPIUM BROMIDE AND ALBUTEROL SULFATE 1 AMPULE: .5; 3 SOLUTION RESPIRATORY (INHALATION) at 15:13

## 2021-08-31 RX ADMIN — ARGATROBAN 3 MCG/KG/MIN: 100 INJECTION, SOLUTION INTRAVENOUS at 08:41

## 2021-08-31 RX ADMIN — GABAPENTIN 300 MG: 300 CAPSULE ORAL at 12:47

## 2021-08-31 RX ADMIN — NITROGLYCERIN 30 MCG/MIN: 20 INJECTION INTRAVENOUS at 05:25

## 2021-08-31 ASSESSMENT — PAIN DESCRIPTION - DESCRIPTORS
DESCRIPTORS: SHARP;SHOOTING;BURNING
DESCRIPTORS: ACHING
DESCRIPTORS: SHARP;SHOOTING

## 2021-08-31 ASSESSMENT — PAIN DESCRIPTION - PAIN TYPE
TYPE: ACUTE PAIN
TYPE: ACUTE PAIN;SURGICAL PAIN
TYPE: ACUTE PAIN

## 2021-08-31 ASSESSMENT — PAIN DESCRIPTION - FREQUENCY
FREQUENCY: INTERMITTENT
FREQUENCY: CONTINUOUS

## 2021-08-31 ASSESSMENT — PAIN DESCRIPTION - ORIENTATION
ORIENTATION: LEFT

## 2021-08-31 ASSESSMENT — PAIN SCALES - GENERAL
PAINLEVEL_OUTOF10: 0
PAINLEVEL_OUTOF10: 2
PAINLEVEL_OUTOF10: 0
PAINLEVEL_OUTOF10: 0
PAINLEVEL_OUTOF10: 10
PAINLEVEL_OUTOF10: 5
PAINLEVEL_OUTOF10: 4
PAINLEVEL_OUTOF10: 0
PAINLEVEL_OUTOF10: 7
PAINLEVEL_OUTOF10: 0
PAINLEVEL_OUTOF10: 8
PAINLEVEL_OUTOF10: 5
PAINLEVEL_OUTOF10: 0

## 2021-08-31 ASSESSMENT — PAIN DESCRIPTION - LOCATION
LOCATION: HAND
LOCATION: ARM
LOCATION: ARM
LOCATION: HAND
LOCATION: HAND

## 2021-08-31 ASSESSMENT — PAIN DESCRIPTION - ONSET
ONSET: PROGRESSIVE
ONSET: AWAKENED FROM SLEEP

## 2021-08-31 ASSESSMENT — PAIN DESCRIPTION - PROGRESSION
CLINICAL_PROGRESSION: GRADUALLY WORSENING
CLINICAL_PROGRESSION: GRADUALLY WORSENING

## 2021-08-31 NOTE — OP NOTE
Operative Note      Patient: Meseret Schmidt  YOB: 1955  MRN: [id number]    Date of Procedure: 8/29/2021    Pre-Op Diagnosis: left arm decreased pulses, recurrent ischemia    Post-Op Diagnosis: Same       Procedure(s):  ARCH AORTOGRAM,LEFT ANGIOGRAM, EXPLORATION OF BRACHIAL ARTERY WITH THROMBECTOMY OF RADIAL AND ULNAR ARTERIES, LEFT SUBCLAVIAN STENT    Surgeon(s):  Sarah Waters DO    Assistant:   * No surgical staff found *    Anesthesia: General    Estimated Blood Loss (mL): 988    Complications: None    Specimens:   ID Type Source Tests Collected by Time Destination   A : LEFT RADIAL, ULNAR ARTERIES THROMBUS Tissue Arm [facility] 8/29/2021 1708        Implants:  Implant Name Type Inv. Item Serial No.  Lot No. LRB No. Used Action   STENT PERIPH AD L37MM DIA7MM CATH L135CM BLLN L40MM SHTH  STENT PERIPH AD L37MM DIA7MM CATH L135CM BLLN L40MM SHTH  [employer] OGK-5 CARDIOVASC-WD [id number] Left 1 Implanted         Drains:   Urethral Catheter Double-lumen 16 fr (Active)   Catheter Indications Perioperative use for selected surgical procedures 08/31/21 0400   Securement Device Date Changed 08/29/21 08/31/21 0400   Site Assessment No urethral drainage 08/31/21 0400   Urine Color Yellow 08/31/21 0600   Urine Appearance Clear 08/31/21 0600   Output (mL) 400 mL 08/31/21 0600       Findings: patent arch aorta without any stenosis, type I arch, patent right innominate, left carotid, 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 collateralized flow to the hand. Detailed Description of Procedure:   Patient was postoperative day #1 and had change of the vascular exam with now absent radial signal and very weak ulnar signal.  Patient was discussed risks, benefits and alternatives of procedure of reexploration of his radial and possible brachial arteries.   He understood and agreed. Patient remained on argatroban drip after his first procedure and was kept on that during the whole case. Patient was brought into the operating room and placed in supine position. His left upper extremity was prepped and draped in sterile fashion. Timeout was performed. The stitches were removed of the is a forearm exploration site. His radial artery was done seen with obvious thrombus. The stitches were cut of the arteriotomy suture after placement of Vesseloops for possible proximal distal control. Thrombus was seen inside. #2 Rosendo was passed proximal and distal.  There was a fresh thrombus seen. There was no good brisk backbleeding and forward bleeding. However there was some. We then decided to evaluate patient's subclavian artery and check for any inflow issues. We decided to perform arch aortogram and left upper extremity angiogram.  Right common femoral artery was prepped and draped in sterile fashion. It was accessed under continuous ultrasound guidance using standard micropuncture technique. 5 Tanzanian glide sheath was inserted, Scalityson wire was advanced proximally followed by pigtail catheter. The wire was removed and pigtail was positioned in the ascending aorta. I was positioned in the Mal[place]. 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 Using a glide cath and glide advantage wire I was able to maneuver those into the left subclavian artery and secured the wire and distally. The catheter was then removed. I then exchanged the 5 Tanzanian glide sheath to 7 x 75 Raabe destination sheath. I placed that into the proximal left subclavian artery. Then I performed sheath injection showing the site of thrombus/occlusion.   We were afraid that the thrombus would be mobile and of the quality that we have seen previously inside the brachial and branch vessels. To prevent inadvertent pushing into the aorta of possible mobile clot we placed a 7 x 40  balloon into the sheath for inflation during manipulation. Next, we decided to open arteriotomy of the brachial artery. We reopened the arm incision and placed retractors exposing the brachial artery. Vesseloops were placed for proximal and the branches were looped with the same Vesseloops for distal control. Using 11 blade we reopened arteriotomy. Then we pushed divided at this wire into the lumen and grabbed with the hemostat. This way we will have the wire flossing. In way placed #5 over-the-wire Rosendo and inflated the proximal subclavian balloon to prevent portion of the clot proximal.  We advanced #5 Rosendo, however we could not pass it beyond the clot. And we inflated balloon and try to perform couple passes, however we did not appreciate anything that was coming back. And then we decided to slowly deflating balloon advanced proximally into the brachial artery try to remove this thrombus. With couple of passes of balloon from proximal to distal in the brachial artery we appreciate couple of pieces of what appeared to be very very chronic clot it was mobile and more created the stenosis. At this moment we decided that the best would be to place a stent at the subclavian artery. We will consider placing the covered stent however that would be causing occlusion of the patent vertebral internal mammary artery. Therefore we decided to place balloon expandable bare-metal stent. We successfully deployed 7 x 37 express stent. On post deployment angiogram there were patent vertebral internal mammary artery and no residual stenosis.   We then passed Rosendo's distally performing thrombectomy of the ulnar artery and then performing angiogram through the the orifice of the ulnar artery showing patency and is chronic occlusion at the distal forearm. Then the perform thrombectomy of the radial artery and performed angiogram through the orifice of the radial artery showing patency of the radial artery. We never saw the patency of the palmar arch and digital vessels as the flow was not going all the way there possibly due to chronic stenosis. Now the backbleeding and forward bleeding were checked at the brachial artery site and there was good. Due to significant manipulation of the brachial artery was the wire that was pulled now there was a small intimal injury that was cleaned using ring forceps. The intima was tacked using 3x7 0 Prolene. Then we decided to perform patch angioplasty using vein patch. At this moment left ankle and lower leg were prepped and draped in sterile fashion and using 15 blade and the ultrasound the incision was made on top of the ankle saphenous vein and was dissected using Metzenbaum scissors, proximally and distally was tied using 3-0 silk ties, the piece of vein was removed and was placed open using Sales scissors. Patch angioplasty of the brachial artery was performed using 7-0 Prolene in a circumferential fashion. It was flushed before completion. This showed flow and sluggish side so the #2 Rosendo was again passed and gave excellent for bleeding and some backbleeding. The lumen was flushed and completed sutured. The sheath injection was then performed through the subclavian sheath and showed excellent inflow into the brachial and branch vessels with very very sluggish flow in the forearm mostly by collateralized vessels. At this moment we decided to reopen radial artery at the forearm and we did not appreciate any thrombus after passing a #2 Rosendo and distally we did not have any backbleeding and no thrombus.   We decided that the best is to suture the radial arteriotomy and continue with conservative management as further manipulation will create more vessel injury and will be counterproductive. He remained on the anticoagulation during the case and a CT showed therapeutic level of 249. His incisions were irrigated and closed in the usual fashion with sterile Vicryl and staples. Sterile dressing was applied. Patient was also started on nitroglycerin drip to maximize medical therapy with vasodilation. Of note I also injected nitroglycerin into the radial artery. In harvest site of the left ankle was closed using 4-0 Vicryl and staples. Right groin sheath was removed and the exit site was closed using 6/7 Mynx. Patient tolerated procedure well was transferred to ICU to continue nitroglycerin drip and anticoagulation with argatroban. There is also the procedure were discussed with wife and patient in detail.       Electronically signed by Mark Zhu DO on 8/31/2021 at 7:57 AM

## 2021-08-31 NOTE — PLAN OF CARE
Problem: Falls - Risk of:  Goal: Will remain free from falls  Description: Will remain free from falls  8/31/2021 0053 by Nikki Rosenthal RN  Outcome: Ongoing  8/30/2021 1727 by Tiffanie Patino RN  Outcome: Ongoing  Goal: Absence of physical injury  Description: Absence of physical injury  8/31/2021 0053 by Nikki Rosenthal RN  Outcome: Ongoing  8/30/2021 1727 by Tiffanie Baldwin RN  Outcome: Ongoing     Problem: Pain:  Description: Pain management should include both nonpharmacologic and pharmacologic interventions.   Goal: Pain level will decrease  Description: Pain level will decrease  8/31/2021 0053 by Nikki Rosenthal RN  Outcome: Ongoing  8/30/2021 1727 by Tiffanie Patino RN  Outcome: Ongoing  Goal: Control of acute pain  Description: Control of acute pain  8/31/2021 0053 by Nikki Rosenthal RN  Outcome: Ongoing  8/30/2021 1727 by Tiffanie Patino RN  Outcome: Ongoing  Goal: Control of chronic pain  Description: Control of chronic pain  8/31/2021 0053 by Nikki Rosenthal RN  Outcome: Ongoing  8/30/2021 1727 by Tiffanie Patino RN  Outcome: Ongoing     Problem: Bleeding:  Goal: Will show no signs and symptoms of excessive bleeding  Description: Will show no signs and symptoms of excessive bleeding  8/31/2021 0053 by Nikki Rosenthal RN  Outcome: Ongoing  8/30/2021 1727 by Tiffanie Patino RN  Outcome: Ongoing     Problem: Fluid Volume:  Goal: Risk for vascular injury will decrease  Description: Risk for vascular injury will decrease  8/31/2021 0053 by Nikki Rosenthal RN  Outcome: Ongoing  8/30/2021 1727 by Tiffanie Patino RN  Outcome: Ongoing     Problem: Physical Regulation:  Goal: Complications related to the disease process, condition or treatment will be avoided or minimized  Description: Complications related to the disease process, condition or treatment will be avoided or minimized  8/31/2021 0053 by Nikki Rosenthal RN  Outcome: Ongoing  8/30/2021 1727 by Tiffanie Patino RN  Outcome: Ongoing     Problem: Safety:  Goal: Ability to remain free from injury will improve  Description: Ability to remain free from injury will improve  8/31/2021 0053 by La Nena Macario RN  Outcome: Ongoing  8/30/2021 1727 by Tiffanie Donohue RN  Outcome: Ongoing

## 2021-08-31 NOTE — PROGRESS NOTES
Progress Note    Patient name: Nataliya Shah  Patient : 1955  MR #[id number]  Room: 144    Portions of this note have been copied forward, however, changed to reflect the most current clinical status of this patient. Subjective: Continues to have numbness in left thumb and pointer finger. No pulse with Doppler in radial and ulnar artery. Brachial artery pulse noted with Doppler. Continues on argatroban drip    POD#3 thrombectomy of the brachial, ulnar and radial arteries on 2021. POD#2 left upper extremity exploratory angiogram of brachial artery with thrombectomy of radial and ulnar arteries and left subclavian stent placement. HISTORY OF PRESENT ILLNESS:  Leslie Lezama was first seen in hematology consultation as an inpatient by Dr. Bob Loomis on 2021. The patient presented from outside hospital with complaints of acute onset coldness and discoloration of the left upper extremity with finger discoloration. He was seen by vascular and found to have acute arterial occlusion. He was taken to the OR on 2021 by vascular for exploration and thrombectomy of the brachial, ulnar and radial arteries. Discussed with vascular. Findings of light fatty appearing thrombus. The patient denies any history of atrial fibrillation or any other cardiac history. He denies any recent procedure arterial.   He has received COVID-19 vaccination with more during the vaccine about 6 weeks ago. I was consulted for findings of thrombocytopenia. His CBC showed WBC count 21,000 with differential showed absolute neutrophil count 18,000, hemoglobin 16.8/MCV 90 and platelet counts 987,879. Patient denies any fever or chills. Denies using steroids recently. Prior CBCs in  showed normal WBC 6.2 with hemoglobin 16.5 and platelet counts 375,291. Patient is currently receiving argatroban. Heparin was avoided due to thrombocytopenia.     Objective   PHYSICAL EXAM:  Physical Exam  Vitals reviewed. Constitutional:       General: He is not in acute distress. Appearance: He is well-developed. He is obese. He is not toxic-appearing or diaphoretic. Comments: Wearing a facial mask. HENT:      Head: Normocephalic and atraumatic. Right Ear: External ear normal.      Left Ear: External ear normal.      Nose: Nose normal.      Mouth/Throat:      Mouth: Mucous membranes are moist.   Eyes:      General: No scleral icterus. Right eye: No discharge. Left eye: No discharge. Conjunctiva/sclera: Conjunctivae normal.   Neck:      Trachea: No tracheal deviation. Cardiovascular:      Rate and Rhythm: Normal rate and regular rhythm. Comments: Left radial pulse present though diminished   Pulmonary:      Effort: Pulmonary effort is normal. No respiratory distress. Breath sounds: Normal breath sounds. No wheezing or rales. Abdominal:      General: Bowel sounds are normal. There is no distension. Palpations: Abdomen is soft. Tenderness: There is no abdominal tenderness. There is no guarding. Genitourinary:     Comments: Exam deferred  Musculoskeletal:         General: No tenderness or deformity. Cervical back: Neck supple. No muscular tenderness. Comments: Normal ROM all four extremities   Lymphadenopathy:      Cervical:      Right cervical: No superficial or deep cervical adenopathy. Left cervical: No superficial or deep cervical adenopathy. Upper Body:      Right upper body: No supraclavicular adenopathy. Left upper body: No supraclavicular adenopathy. Comments:      Skin:     General: Skin is warm and dry. Findings: No rash. Comments: Dressing LUE D/I   Neurological:      Mental Status: He is alert and oriented to person, place, and time. Comments: follows commands, non-focal   Psychiatric:         Behavior: Behavior normal. Behavior is cooperative. Thought Content:  Thought content normal. Judgment: Judgment normal.      Comments: Alert and oriented to person, place and time. Vital Signs  /60   Pulse 95   Temp 97.7 °F (36.5 °C) (Temporal)   Resp 14   Ht 5' 8\" (1.727 m)   Wt 236 lb 12.8 oz (107.4 kg)   SpO2 92%   BMI 36.01 kg/m²     Intake/Output Summary (Last 24 hours) at 8/31/2021 0656  Last data filed at 8/31/2021 0600  Gross per 24 hour   Intake 3469.43 ml   Output 3780 ml   Net -310.57 ml     Labs:  CBC:   Recent Labs     08/29/21  1726 08/30/21  0608 08/31/21  0518   WBC 22.6* 22.6* 17.1*   HGB 12.9* 11.0* 9.7*    167 140     CMP:   Recent Labs     08/29/21  0719 08/30/21  0608 08/31/21  0518   GLUCOSE 267* 181* 193*   BUN 48* 46* 44*   CREATININE 1.2 1.2 1.1   CO2 23 24 22   CALCIUM 7.8* 7.5* 7.1*     Hepatic:   No results for input(s): AST, ALT, ALB, BILITOT, ALKPHOS in the last 72 hours. Troponin:   No results for input(s): TROPONINI in the last 72 hours. BNP: No results for input(s): BNP in the last 72 hours. INR:   No results for input(s): INR in the last 72 hours. ABG: No results for input(s): PHART, AOL7ZGS, PO2ART, PKN3CSH, P1YAIAAJ, BEART in the last 72 hours. 30 Day lookback of cultures:    Blood Culture Recent: No results for input(s): BC in the last 720 hours. Gram Stain Recent: No results for input(s): LABGRAM in the last 720 hours. Resp Culture Recent: No results for input(s): CULTRESP in the last 720 hours. Body Fluid Recent : No results for input(s): BFCX in the last 720 hours. MRSA Recent : No results for input(s): [address] [facility] [address] in the last 720 hours. Urine Culture Recent : No results for input(s): LABURIN in the last 720 hours. Organism Recent : No results for input(s): ORG in the last 720 hours. ASSESSMENT/PLAN:  Left upper extremity acute arterial occlusion  Patient denies any history of atrial fibrillation. Pulse is regular. No arrhythmia on tele    8/27/2021- Op note  Findings: thrombus occlusion of brachial artery.  High bifurcation of the brachial artery into ulnar, radial and interosseus at the same level. Thrombus occlusion of radial artery that required additional midforearm radial artery exposure. Light fatty appearing thrombus with unknown etiology. Send for pathology. Troponin -<0.01      Excerpt from up-to-date: Thromboembolism is the most common cause of acute upper extremity ischemia responsible for 61 percent of cases, and typically affecting older patients. The most common etiologies include atrial fibrillation (51%), valvular heart disease (6%), and isolated ischemic heart disease with left ventricular hypokinesis (4%). Other sources of cardioembolism include left atrial myxoma, left ventricular aneurysm, valvular vegetations in infective endocarditis, or nonbacterial marantic endocarditis. Atherosclerosis of the ascending aorta or arch, innominate or subclavian arteries, or aneurysm affecting the subclavian, axillary (eg, poststenotic, chronic trauma from crutch use, or ulnar artery (eg, hypothenar hammer syndrome can also be sources of embolism     8/28/2021-Dr. Ordonez completed thrombectomy of the brachial, ulnar and radial arteries of the left upper extremity. No evidence of aortic aneurysm or aneurysm involving the left upper extremity arterial system. 2D echo 8/28/2021: no evidence of LV mass or thrombus. Preserved LV/RV function. EF 55-60%. Bubble study negative    8/29/2021 -Dr. Tangela Ruiz completed left upper extremity exploratory angiogram of brachial artery with thrombectomy of radial and ulnar arteries and left subclavian stent placement. 8/30/2021 -bilateral lower extremity venous duplex reported no evidence of DVT or SVT. Reflux noted within the right common femoral vein only at this time.     Serology on 8/30/2021:  Beta 2 glycoprotein IgG and IgM, cardiolipin antibody IgG and IgM-in process    Renal impairment improved      Neutrophilic leukocytosis/thrombocytopenia/acute blood loss anemia      Acute surgical blood loss of approximately 500 cc on 8/28/2021 and 8/29/2021    PLAN:  POD#3 thrombectomy of the brachial, ulnar and radial arteries on 8/28/2021 - await pathology  POD#2 left upper extremity exploratory angiogram of brachial artery with thrombectomy of radial and ulnar arteries and left subclavian stent placement.-Await pathology    OP Zeal patch x 14 days, per cardiology  Consider MURPHY pending pathology  Continue argatroban    Beta 2 glycoprotein IgG and IgM, cardiolipin antibody IgG and IgM-in process  Complete hypercoag work-up as an outpatient      Anticipating transfer to tertiary care center in [facility] in [place] under the care of vascular surgeon Dr. Champ Covarrubias once bed is available. (Please note that portions of this note were completed with a voice recognition program. Efforts were made to edit the dictations but occasionally words are mis-transcribed.)    Wendy Christina, SANKET  08/31/21  6:56 AM   Physician's attestation/substantial contribution:  I, Dr Patrice Fenton, independently performed an evaluation on [address] I have reviewed relevant medical information/data to include but not limited to medication list, relevant appropriate labs and imaging when applicable. I reviewed other physician's notes, ancillary services and nurses assessment. I have reviewed the above documentation completed by the Nurse Practitioner or Physician Assistant. Please see my additional contributions to the history of present illness, physical examination, and assessment/medical decision-making that reflect my findings and impressions. I discussed essential elements of the care plan with the NP or PA and the patient as well. I answered all the questions to the patient's satisfaction. I concur with above stated. Subjective-continues to complains of tingling in hands. Objective-bluish discoloration left thumb and index finger. Decreased sensation.   Assessment/plan:  Arterial embolism-source is unknown. No clear evidence of a cardiac source. Continue argatroban. Agree with transfer to [facility]  Postoperative anemia-estimated blood loss of about 1000 mL after 2 surgeries. Continue to monitor hemoglobin  Discussed with vascular.   Frantz Martinez MD

## 2021-08-31 NOTE — PROGRESS NOTES
Vascular Surgery  Dr. Mark Zhu   Daily Progress Note    Pt Name: Pauline Augustine Record Number: [id number]  Date of Birth 1955   Today's Date: 8/31/2021        SUBJECTIVE:     Patient was seen and examined. Sitting up in chair. Stated the Neurontin had helped tremendously. Still with intermittent pain in thumb and index finger of left hand described as setting your finger on hot stove. OBJECTIVE:     Patient Vitals for the past 24 hrs:   BP Temp Temp src Pulse Resp SpO2   08/31/21 0800 (!) 150/84 -- -- 104 (!) 31 93 %   08/31/21 0700 (!) 121/54 97.2 °F (36.2 °C) -- 99 20 92 %   08/31/21 0620 -- -- -- -- 14 92 %   08/31/21 0600 127/60 -- -- 95 12 92 %   08/31/21 0500 (!) 146/66 -- -- 94 14 92 %   08/31/21 0400 (!) 130/59 97.7 °F (36.5 °C) Temporal 99 11 (!) 88 %   08/31/21 0300 (!) 102/58 -- -- 90 15 92 %   08/31/21 0200 (!) 127/57 -- -- 88 12 92 %   08/31/21 0100 (!) 135/53 -- -- 85 17 92 %   08/31/21 0000 (!) 115/53 97.8 °F (36.6 °C) Temporal 87 16 92 %   08/30/21 2300 (!) 121/51 -- -- 89 16 90 %   08/30/21 2200 131/71 -- -- 100 20 90 %   08/30/21 2100 (!) 126/57 -- -- 95 12 91 %   08/30/21 2000 112/68 97 °F (36.1 °C) Temporal 100 13 91 %   08/30/21 1900 100/65 -- -- 101 13 91 %   08/30/21 1800 (!) 126/55 -- -- 109 18 92 %   08/30/21 1700 132/65 -- -- 103 18 91 %   08/30/21 1600 120/66 97.3 °F (36.3 °C) Temporal 100 14 95 %   08/30/21 1500 115/65 -- -- 97 10 90 %   08/30/21 1400 133/63 -- -- 99 14 91 %   08/30/21 1300 131/75 -- -- 99 14 92 %   08/30/21 1200 (!) 143/58 98.5 °F (36.9 °C) Temporal 105 13 92 %   08/30/21 1100 (!) 145/71 -- -- 100 12 95 %         Intake/Output Summary (Last 24 hours) at 8/31/2021 1023  Last data filed at 8/31/2021 0800  Gross per 24 hour   Intake 2871.88 ml   Output 3505 ml   Net -633.12 ml       In: 2314.3 [P.O.:1880; I.V.:434.3]  Out: 1772 [Urine:3180]    I/O last 3 completed shifts:   In: 3469.4 [P.O.:2560; I.V.:909.4]  Out: 3780 [IKOBR:9546]     Date 08/31/21 0000 - 08/31/21 2359   Shift 8013-3681 0892-8792 3521-2619 24 Hour Total   INTAKE   P.O. 500 120  620   I. V.(mL/kg/hr) 195.2(0.2)   195.2   Shift Total(mL/kg) 695.2(6.5) 120(1.1)  815.2(7.6)   OUTPUT   Urine(mL/kg/hr) 1555(1.8) 300  1855   Shift Total(mL/kg) 1555(14.5) 300(2.8)  1855(17.3)   Weight (kg) 107.4 107.4 107.4 107.4       Wt Readings from Last 3 Encounters:   08/30/21 236 lb 12.8 oz (107.4 kg)        Body mass index is 36.01 kg/m². Diet: ADULT DIET; Regular; Low Potassium (Less than 3000 mg/day)        MEDS:     Scheduled Meds:   sodium polystyrene  30 g Oral Once    gabapentin  300 mg Oral TID    ipratropium-albuterol  1 ampule Inhalation Q4H WA    cefepime  2,000 mg IntraVENous Q12H    sodium chloride flush  5-40 mL IntraVENous 2 times per day    hydroCHLOROthiazide  25 mg Oral Daily    lisinopril  20 mg Oral Daily    mirtazapine  45 mg Oral Nightly    pantoprazole  40 mg Oral QAM AC     Continuous Infusions:   sodium chloride 75 mL/hr at 08/31/21 0942    sodium chloride      nitroGLYCERIN 30 mcg/min (08/31/21 0525)    argatroban infusion 250 mg in 250 mL 3 mcg/kg/min (08/31/21 0841)     PRN Meds:fentanNYL, 50 mcg, Q2H PRN  diphenhydrAMINE, 25 mg, Q6H PRN  sodium chloride flush, 5-40 mL, PRN  sodium chloride, 25 mL, PRN  HYDROcodone-acetaminophen, 1 tablet, Q4H PRN  calcium carbonate, 500 mg, TID PRN  polyethylene glycol, 17 g, Daily PRN  melatonin, 5 mg, Nightly PRN  naloxone, 0.4 mg, PRN  ondansetron, 4 mg, Q8H PRN   Or  ondansetron, 4 mg, Q6H PRN          PHYSICAL EXAM:     CONSTITUTIONAL: Alert and oriented times 3, no acute distress and cooperative to examination. LUNGS: Clear bilateral breath sounds without wheezes or rhonchi. CARDIOVASCULAR: Normal HT with RRR. No murmurs, gallops or rubs. ABDOMEN: Mild firmness, rounded, but not distended. Active bowel sounds x 4. NEUROLOGIC: Grossly intact.    WOUND/INCISION: Left upper arm and forearm incisions with staples intact. Small amount of bloody drainage noted on left upper arm dressing. Edges well approximated. No erythema. Mild left upper arm edema. EXTREMITY: Unable to palpate or dopple left radial pulse. Tip of thumb and index finger of left hand dark purplish color. Remaining fingers pink with brisk capillary refill. Patient unable to make closed fist with thumb and index finger. LABS:     CBC:   Recent Labs     08/29/21  1726 08/30/21  0608 08/31/21  0518   WBC 22.6* 22.6* 17.1*   RBC 4.33* 3.67* 3.27*   HGB 12.9* 11.0* 9.7*   HCT 40.5* 33.9* 28.8*   MCV 93.5 92.4 88.1   MCH 29.8 30.0 29.7   MCHC 31.9* 32.4* 33.7   RDW 13.1 13.0 12.4    167 140   MPV 11.0 10.2 10.3      Last 3 CMP:   Recent Labs     08/29/21  0719 08/30/21  0608 08/31/21  0518   * 130* 124*   K 5.0 5.5* 4.8   CL 98 99 88*   CO2 23 24 22   BUN 48* 46* 44*   CREATININE 1.2 1.2 1.1   GLUCOSE 267* 181* 193*   CALCIUM 7.8* 7.5* 7.1*      Calcium:   Lab Results   Component Value Date    CALCIUM 7.1 08/31/2021    CALCIUM 7.5 08/30/2021    CALCIUM 7.8 08/29/2021       DVT prophylaxis: Argatroban drip          ASSESSMENT:     1. Ischemia, Distal Left Upper Extremity 2' to Acute Arterial Occlusion - S/P Exploration of LUE with Thrombectomy of Brachial, Ulnar and Radial Arteries on 08/28/2021; Arch Aortogram with Left Angiogram, Exploration of Brachial Artery with Thrombectomy of Radial and Ulnar Arteries, Left Subclavian Stent, Vein Patch Angioplasty of Brachial Artery on 08/29/2021  2. Thrombocytopenia - Argatroban  3. Essential HTN  4. GERD  5. Hx Black Lung Disease  6. Former Cigarette Smoker          PLAN:     1.  Recommend transfer to tertiary care center for evaluation by vascular microvascular hand specialist.      Karan Jalloh, APRN-BC

## 2021-08-31 NOTE — PROGRESS NOTES
Patient still has doppler brachial pulse. No pulse in ulna or radial arteries by doppler. Updated patient about no beds available in [facility], [address] [place] [address], or [place]. We will continue to monitor.

## 2021-08-31 NOTE — DISCHARGE SUMMARY
[place], [place], [address]    DEPARTMENT OF HOSPITALIST MEDICINE      DISCHARGE SUMMARY:      PATIENT NAME:  Jacob Noyola  :    MRN:  [id number]    Admission Date:   2021  9:14 PM Attending: Mane Arreola MD   Discharge Date:   2021              PCP: Dave Man  Length of Stay: 5 days     Chief Complaint on Admission: No chief complaint on file. Consultants:     IP CONSULT TO VASCULAR SURGERY  IP CONSULT TO HEM/ONC  IP CONSULT TO CARDIOLOGY  IP CONSULT TO ORTHOPEDIC SURGERY  IP CONSULT TO NEPHROLOGY       Discharge Problem List:   Principal Problem:    Arterial embolism and thrombosis of upper extremity ([facility] [address]  Active Problems:    Hypertension    Ischemia of left upper extremity    GERD (gastroesophageal reflux disease), as per medication reconciliation    Black lung ([facility] [address]    History of tobacco abuse  Resolved Problems:    * No resolved hospital problems. [facility]  COURSE  AND  TREATMENT:    2021  We have a bed available at [facility] in [place], [place]. EMS transport is on his way to pick him up. He cannot be transferred on IV argatroban drip due to transfer protocol hence he has been switched to IV heparin drip as per vascular surgery. He is still requiring pain medication to control pain in his left arm and hands. Sodium was low at 121 today. I spoke with the nephrologist who recommended IV diuresis and fluid restriction. I ordered 20 mg Lasix IV x1 dose before transfer. I would request the accepting physician to monitor his renal functions closely. Patient to be transferred to tertiary care center at [facility] in [place], [place] under care of microvascular hand surgeon Dr. Andie Hidalgo via EMS transport . .. Any other medical instructions regarding transfer as per vascular surgery! 2021  Patient is still complaining of pain and numbness in the thumb and index finger which responds to pain medications.  His other 3 fingers of the hand are slowly improving.    He has been continuously on IV argatroban drip for anticoagulation and IV nitroglycerin drip for vasodilatation. Patient has been accepted by microvascular hand specialist at [facility] in [place], [place] but the hospital is on diversion and we are still waiting for beds to open up.  Vascular surgery is attempting to reach other facilities for transfer as well. Jax Galvan has lost 1100 cc blood during 2 surgeries few days ago. Aleisha Leslie are monitoring his Hb which fell from 15.5-9.7 over the last 4 days.    I ordered  iron studies to determine if he needs IV iron which are within acceptable range. His leukocytosis has decreased from 22.6-17.1 overnight with the continuous treatment with aggressive IV antibiotics. Ara Bauer has been on IV cefepime since 8/29/2021.    8/30/2021  Patient is underwent aggressive surgical interventions by vascular surgery over the last couple of days for revascularization and thrombectomy involving arteries of his left arm. Vascular surgery also consulted hand surgery and they are trying to transfer patient to tertiary center for higher level of care. Patient has been accepted by vascular surgeon Dr. Ana Deluca at [facility], but they have no beds and he is on the waiting list.     8/29/2021  Patient was seen by vascular surgeon this morning who did an arterial duplex which showed radial and under occlusion at the level of proximal to mid forearm despite being therapeutic on anticoagulation. Patient was taken back to the OR and underwent aggressive vascular surgical intervention. I saw and evaluate the patient postop in PACU. He is being transferred to ICU on anticoagulation.     8/28/2021  Patient admitted last night and underwent emergent intervention by our vascular surgery team for removal of thrombus from the left arm arterial system. Postop is doing well.   Still has some numbness in the left hand and Skin: Warm and dry          Laboratory Data:  Recent Labs     08/30/21  0608 08/31/21  0518 09/01/21  0446   WBC 22.6* 17.1* 18.3*   HGB 11.0* 9.7* 9.4*    140 115*     Recent Labs     08/30/21  0608 08/31/21  0518 09/01/21  0446   * 124* 121*   K 5.5* 4.8 5.0   CL 99 88* 89*   CO2 24 22 25   BUN 46* 44* 32*   CREATININE 1.2 1.1 0.8   GLUCOSE 181* 193* 141*     No results for input(s): AST, ALT, ALB, BILITOT, ALKPHOS in the last 72 hours. Troponin T: No results for input(s): TROPONINI in the last 72 hours. Pro-BNP: No results for input(s): BNP in the last 72 hours. INR: No results for input(s): INR in the last 72 hours. UA:No results for input(s): NITRITE, COLORU, PHUR, LABCAST, WBCUA, RBCUA, MUCUS, TRICHOMONAS, YEAST, BACTERIA, CLARITYU, SPECGRAV, LEUKOCYTESUR, UROBILINOGEN, BILIRUBINUR, BLOODU, GLUCOSEU, AMORPHOUS in the last 72 hours. Invalid input(s): Mardel Greener  A1C: No results for input(s): LABA1C in the last 72 hours. ABG:No results for input(s): PHART, TXR8ZLD, PO2ART, AUQ5UKN, BEART, HGBAE, O2LIAVZO, CARBOXHGBART in the last 72 hours. Impressions of imaging performed in 48 hours before discharge:    No results found. DISCHARGE  MEDICATIONS:  Please see the attached printed med rec sheet from the hospital    Further medical management to be taken over and patient medications to be addressed by the accepting attending physician at tertiary care center. Condition on Discharge: unchanged  Discharge Disposition: Patient to be transferred to tertiary [facility] at [facility] in [place], [place] under care of microvascular hand surgeon Dr. Rakesh So     Recommended Follow Up:  No follow-up provider specified. Followup Appointments Scheduled at Time of Discharge:  No future appointments. Discharge Instructions:   Please see the discharge paperwork. Patient was seen at bedside today, and the examination shows improvement since yesterday.     Detailed

## 2021-09-01 VITALS
WEIGHT: 239.8 LBS | OXYGEN SATURATION: 98 % | RESPIRATION RATE: 26 BRPM | DIASTOLIC BLOOD PRESSURE: 59 MMHG | TEMPERATURE: 98.4 F | HEIGHT: 68 IN | BODY MASS INDEX: 36.34 KG/M2 | HEART RATE: 82 BPM | SYSTOLIC BLOOD PRESSURE: 121 MMHG

## 2021-09-01 LAB
ANION GAP SERPL CALCULATED.3IONS-SCNC: 7 MMOL/L (ref 7–19)
ANTICARDIOLIPIN IGG ANTIBODY: <10 GPL (ref 0–14)
APTT: 51 SEC (ref 26–36.2)
APTT: 51 SEC (ref 26–36.2)
ATYPICAL LYMPHOCYTE RELATIVE PERCENT: 2 % (ref 0–8)
BASOPHILS ABSOLUTE: 0 K/UL (ref 0–0.2)
BASOPHILS RELATIVE PERCENT: 0 % (ref 0–1)
BETA-2 GLYCOPROTEIN 1 IGG ANTIBODY: 0 SGU (ref 0–20)
BETA-2 GLYCOPROTEIN 1 IGM ANTIBODY: 10 SMU (ref 0–20)
BUN BLDV-MCNC: 32 MG/DL (ref 8–23)
CALCIUM SERPL-MCNC: 7.4 MG/DL (ref 8.8–10.2)
CARDIOLIPIN AB IGM: 10 MPL (ref 0–12)
CHLORIDE BLD-SCNC: 89 MMOL/L (ref 98–111)
CO2: 25 MMOL/L (ref 22–29)
CREAT SERPL-MCNC: 0.8 MG/DL (ref 0.5–1.2)
EOSINOPHILS ABSOLUTE: 0 K/UL (ref 0–0.6)
EOSINOPHILS RELATIVE PERCENT: 0 % (ref 0–5)
GFR AFRICAN AMERICAN: >59
GFR NON-AFRICAN AMERICAN: >60
GLUCOSE BLD-MCNC: 141 MG/DL (ref 74–109)
HCT VFR BLD CALC: 27.2 % (ref 42–52)
HEMOGLOBIN: 9.4 G/DL (ref 14–18)
IMMATURE GRANULOCYTES #: 0.7 K/UL
LYMPHOCYTES ABSOLUTE: 2 K/UL (ref 1.1–4.5)
LYMPHOCYTES RELATIVE PERCENT: 9 % (ref 20–40)
MCH RBC QN AUTO: 30.6 PG (ref 27–31)
MCHC RBC AUTO-ENTMCNC: 34.6 G/DL (ref 33–37)
MCV RBC AUTO: 88.6 FL (ref 80–94)
MONOCYTES ABSOLUTE: 2.6 K/UL (ref 0–0.9)
MONOCYTES RELATIVE PERCENT: 14 % (ref 0–10)
MYELOCYTE PERCENT: 1 %
NEUTROPHILS ABSOLUTE: 13.7 K/UL (ref 1.5–7.5)
NEUTROPHILS RELATIVE PERCENT: 73 % (ref 50–65)
OVALOCYTES: ABNORMAL
PDW BLD-RTO: 12.3 % (ref 11.5–14.5)
PLATELET # BLD: 115 K/UL (ref 130–400)
PLATELET SLIDE REVIEW: ABNORMAL
PMV BLD AUTO: 9.9 FL (ref 9.4–12.4)
POTASSIUM REFLEX MAGNESIUM: 5 MMOL/L (ref 3.5–5)
PROMYELOCYTES PERCENT: 1 %
RBC # BLD: 3.07 M/UL (ref 4.7–6.1)
SODIUM BLD-SCNC: 121 MMOL/L (ref 136–145)
SODIUM BLD-SCNC: 122 MMOL/L (ref 136–145)
WBC # BLD: 18.3 K/UL (ref 4.8–10.8)

## 2021-09-01 PROCEDURE — 6370000000 HC RX 637 (ALT 250 FOR IP): Performed by: HOSPITALIST

## 2021-09-01 PROCEDURE — 2580000003 HC RX 258: Performed by: SURGERY

## 2021-09-01 PROCEDURE — 6360000002 HC RX W HCPCS: Performed by: NURSE PRACTITIONER

## 2021-09-01 PROCEDURE — 6360000002 HC RX W HCPCS: Performed by: HOSPITALIST

## 2021-09-01 PROCEDURE — 6370000000 HC RX 637 (ALT 250 FOR IP): Performed by: SURGERY

## 2021-09-01 PROCEDURE — 94640 AIRWAY INHALATION TREATMENT: CPT

## 2021-09-01 PROCEDURE — 6360000002 HC RX W HCPCS: Performed by: SURGERY

## 2021-09-01 PROCEDURE — 2700000000 HC OXYGEN THERAPY PER DAY

## 2021-09-01 RX ORDER — SODIUM CHLORIDE 1000 MG
2 TABLET, SOLUBLE MISCELLANEOUS ONCE
Status: COMPLETED | OUTPATIENT
Start: 2021-09-01 | End: 2021-09-01

## 2021-09-01 RX ORDER — FUROSEMIDE 10 MG/ML
20 INJECTION INTRAMUSCULAR; INTRAVENOUS ONCE
Status: COMPLETED | OUTPATIENT
Start: 2021-09-01 | End: 2021-09-01

## 2021-09-01 RX ORDER — FENTANYL CITRATE 50 UG/ML
50 INJECTION, SOLUTION INTRAMUSCULAR; INTRAVENOUS ONCE
Status: COMPLETED | OUTPATIENT
Start: 2021-09-01 | End: 2021-09-01

## 2021-09-01 RX ORDER — HEPARIN SODIUM 1000 [USP'U]/ML
40 INJECTION, SOLUTION INTRAVENOUS; SUBCUTANEOUS PRN
Status: DISCONTINUED | OUTPATIENT
Start: 2021-09-01 | End: 2021-09-01 | Stop reason: HOSPADM

## 2021-09-01 RX ORDER — HEPARIN SODIUM 10000 [USP'U]/100ML
5-30 INJECTION, SOLUTION INTRAVENOUS CONTINUOUS
Status: DISCONTINUED | OUTPATIENT
Start: 2021-09-01 | End: 2021-09-01 | Stop reason: HOSPADM

## 2021-09-01 RX ORDER — HEPARIN SODIUM 1000 [USP'U]/ML
80 INJECTION, SOLUTION INTRAVENOUS; SUBCUTANEOUS PRN
Status: DISCONTINUED | OUTPATIENT
Start: 2021-09-01 | End: 2021-09-01 | Stop reason: HOSPADM

## 2021-09-01 RX ADMIN — FENTANYL CITRATE 50 MCG: 50 INJECTION, SOLUTION INTRAMUSCULAR; INTRAVENOUS at 00:48

## 2021-09-01 RX ADMIN — HYDROCODONE BITARTRATE AND ACETAMINOPHEN 1 TABLET: 7.5; 325 TABLET ORAL at 12:38

## 2021-09-01 RX ADMIN — Medication 2 G: at 07:40

## 2021-09-01 RX ADMIN — IPRATROPIUM BROMIDE AND ALBUTEROL SULFATE 1 AMPULE: .5; 3 SOLUTION RESPIRATORY (INHALATION) at 06:52

## 2021-09-01 RX ADMIN — NITROGLYCERIN 0.5 INCH: 20 OINTMENT TOPICAL at 07:42

## 2021-09-01 RX ADMIN — DIPHENHYDRAMINE HYDROCHLORIDE 25 MG: 50 INJECTION, SOLUTION INTRAMUSCULAR; INTRAVENOUS at 01:20

## 2021-09-01 RX ADMIN — GABAPENTIN 300 MG: 300 CAPSULE ORAL at 12:38

## 2021-09-01 RX ADMIN — HYDROCODONE BITARTRATE AND ACETAMINOPHEN 1 TABLET: 7.5; 325 TABLET ORAL at 01:56

## 2021-09-01 RX ADMIN — NITROGLYCERIN 0.5 INCH: 20 OINTMENT TOPICAL at 01:06

## 2021-09-01 RX ADMIN — HEPARIN SODIUM 18 UNITS/KG/HR: 10000 INJECTION, SOLUTION INTRAVENOUS at 09:54

## 2021-09-01 RX ADMIN — FENTANYL CITRATE 50 MCG: 50 INJECTION INTRAMUSCULAR; INTRAVENOUS at 08:58

## 2021-09-01 RX ADMIN — GABAPENTIN 300 MG: 300 CAPSULE ORAL at 05:30

## 2021-09-01 RX ADMIN — IPRATROPIUM BROMIDE AND ALBUTEROL SULFATE 1 AMPULE: .5; 3 SOLUTION RESPIRATORY (INHALATION) at 10:54

## 2021-09-01 RX ADMIN — PANTOPRAZOLE SODIUM 40 MG: 40 TABLET, DELAYED RELEASE ORAL at 05:30

## 2021-09-01 RX ADMIN — FENTANYL CITRATE 50 MCG: 50 INJECTION INTRAMUSCULAR; INTRAVENOUS at 04:03

## 2021-09-01 RX ADMIN — CEFEPIME HYDROCHLORIDE 2000 MG: 2 INJECTION, POWDER, FOR SOLUTION INTRAVENOUS at 09:18

## 2021-09-01 RX ADMIN — FUROSEMIDE 20 MG: 10 INJECTION, SOLUTION INTRAMUSCULAR; INTRAVENOUS at 11:11

## 2021-09-01 RX ADMIN — FENTANYL CITRATE 50 MCG: 50 INJECTION INTRAMUSCULAR; INTRAVENOUS at 06:44

## 2021-09-01 ASSESSMENT — PAIN DESCRIPTION - DESCRIPTORS
DESCRIPTORS: SHARP;SHOOTING;BURNING
DESCRIPTORS: BURNING;SHOOTING;SHARP
DESCRIPTORS: BURNING;SHOOTING
DESCRIPTORS: SHARP;SHOOTING
DESCRIPTORS: SHARP;SHOOTING;BURNING

## 2021-09-01 ASSESSMENT — PAIN DESCRIPTION - PAIN TYPE
TYPE: ACUTE PAIN

## 2021-09-01 ASSESSMENT — PAIN DESCRIPTION - LOCATION
LOCATION: HAND
LOCATION: HEAD
LOCATION: HAND

## 2021-09-01 ASSESSMENT — PAIN DESCRIPTION - ORIENTATION
ORIENTATION: LEFT

## 2021-09-01 ASSESSMENT — PAIN DESCRIPTION - PROGRESSION
CLINICAL_PROGRESSION: GRADUALLY WORSENING
CLINICAL_PROGRESSION: GRADUALLY IMPROVING

## 2021-09-01 ASSESSMENT — PAIN DESCRIPTION - FREQUENCY
FREQUENCY: CONTINUOUS

## 2021-09-01 ASSESSMENT — PAIN DESCRIPTION - ONSET
ONSET: AWAKENED FROM SLEEP
ONSET: ON-GOING

## 2021-09-01 ASSESSMENT — PAIN SCALES - GENERAL
PAINLEVEL_OUTOF10: 8
PAINLEVEL_OUTOF10: 0
PAINLEVEL_OUTOF10: 10
PAINLEVEL_OUTOF10: 10
PAINLEVEL_OUTOF10: 0
PAINLEVEL_OUTOF10: 6
PAINLEVEL_OUTOF10: 10
PAINLEVEL_OUTOF10: 7
PAINLEVEL_OUTOF10: 10
PAINLEVEL_OUTOF10: 8

## 2021-09-01 NOTE — PROGRESS NOTES
Talking with transfer center about need for RN unable to obtain. Tried hair transport weather or mechanical trouble unable. Talked with Dr Andres Merrill about Argatroban able to change to heparin. Drip changed to Heparin drip at 18.6 mg/kg/hr. Argatroban drip stopped. [facility] ambulance informed of change to heparin drip. Now able to transport on Heparin drip and will let their captain know. Patient informed of wait for EMS now. He has been calling and updating wife.

## 2021-09-01 NOTE — PROGRESS NOTES
patient to tertiary center for higher level of care. Patient has been accepted by vascular surgeon Dr. Sivakumar Soto at [facility], but they have no beds and he is on the waiting list.    8/29/2021  Patient was seen by vascular surgeon this morning who did an arterial duplex which showed radial and under occlusion at the level of proximal to mid forearm despite being therapeutic on anticoagulation. Patient was taken back to the OR and underwent aggressive vascular surgical intervention. I saw and evaluate the patient postop in PACU. He is being transferred to ICU on anticoagulation. 8/28/2021  Patient admitted last night and underwent emergent intervention by our vascular surgery team for removal of thrombus from the left arm arterial system. Postop is doing well. Still has some numbness in the left hand and arm. Patient seen and evaluated by cardiology and oncology. Patient undergoing hypercoagulable work-up. He is still on argatroban IV infusion. 8/27/2021  OSH Referring ED Sign Out:  onset at about 18:15 of severe left hand pain and purple color. 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 His PMH is HTN  Sx Hx left neck Sx many years ago, else no surgeries  No Allergies  Lisinopril  Mirtazapine  Naprxoen  Omeprazole  HCTZ  Labs remarkable for WBC 30k  Had diarrhea onset today  COVID test negative  Hb 18.3 HCT 53    DDimer 638  INR 1.0  CMP had , Cr 1.5m, BUN 39 nd all else \"looked good\"  Cardiac enzymes negative  EKG \"SR\"  CXR \"unremarkable\"  Nonsmoker     HPI:  Mr. Tami Chan is a pleasant  [place] gentleman of 66 years. He presented to the [facility] Emergency Department for sudden left hand pain with purple color. He was referred to us for an ischemic left upper extremity with threatened hand by Bria Soni NP. He had the onset at about 18:15 of severe left hand pain and purple color.  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 He is reported to be a nonsmoker with a PMHx of only HTN diagnosed. He is on Lisinopril, HCTZ, Naproxen, Mirtazapine, and Omeprazole at home. He has no known allergies. His only Sx Hx is of left neck surgery \"years ago\".        REVIEW  OF  SYSTEMS:    Constitutional:  No fevers, chills, nausea, vomiting, + tiredness and fatigue   Lungs:   No hemoptysis, pleurisy, cough, SOB   Heart:  No chest pressure with exertion, palpitations,    Abdomen:   No new masses, no bright red blood per rectum   Extremities: No acute pain while ambulating, no new lesions, + left arm under postop bandage   Neurologic: No new motor or sensory changes, + numbness left arm and hand        PAST MEDICAL HISTORY:  Past Medical History:   Diagnosis Date    Black lung ([address]     GERD (gastroesophageal reflux disease), as per medication reconciliation     History of tobacco abuse     Hypertension     Other chronic back pain     back broken in rockfall in mine in [address] [place] [address] Street:  Past Surgical History:   Procedure Laterality Date    NECK SURGERY Left     \"years ago\" a per report on [id number]    WOUND EXPLORATION Left 8/29/2021    ARCH AORTOGRAM,LEFT ANGIOGRAM, EXPLORATION OF BRACHIAL ARTERY WITH THROMBECTOMY OF RADIAL AND ULNAR ARTERIES, LEFT SUBCLAVIAN STENT performed by Ester Grace DO at [facility] Left 8/28/2021    EXPLORATION LEFT UPPER EXTREMITY WITH THROMBECTOMY OF BRACHIAL, ULNAR AND RADIAL ARTERIES performed by Ester Grace DO at [facility] OR        FAMILY HISTORY:  Family History   Problem Relation Age of Onset    Heart Disease Mother     Other Mother         tobacco    Heart Disease Father     Other Father         tobacco    Cancer Father     Diabetes Sister     Heart Disease Sister     Other Sister         smoker    Dementia Sister     COPD Sister         smoker    COPD Sister         smoker    Cancer Sister     COPD Sister         heavy smoker    Drug Abuse Brother     No Known Problems Daughter     Heart Disease Daughter         congenital from the Mother's side           OBJECTIVE:  BP (!) 189/102   Pulse 106   Temp 98 °F (36.7 °C) (Temporal)   Resp 11   Ht 5' 8\" (1.727 m)   Wt 236 lb 12.8 oz (107.4 kg)   SpO2 95%   BMI 36.01 kg/m²   I/O this shift:  In: 1141.6 [P.O.:720;  I.V.:421.6]  Out: 650 [Urine:650]    PHYSICAL  EXAMINATION:    JANETH:  Awake, alert, oriented x 3, patient appears tired and fatigued   Head/Eyes:  Normocephalic, atraumatic, EOMI and PERRLA bilaterally   Respiratory:   Bilateral fair air entry in both lung fields, mild B/L crackles, symmetric expansion of chest   Cardiovascular:  Regular rate and rhythm, S1+S2+0, no murmurs/rubs   Abdomen:   Soft, non-tender, bowel sounds +ve, no organomegaly   Extremities: Moves all, full range of motion, + left arm in bandage status post vascular surgery interventions   Neurologic: Awake, alert, oriented x 3, cranial nerves II-XII intact, no focal neurological deficits, sensory system intact   Psychiatric: Normal mood, non-suicidal       CURRENT MEDICATIONS:  Scheduled:   mirtazapine  45 mg Oral Nightly    polyethylene glycol  17 g Oral BID    metoprolol tartrate  25 mg Oral BID    sodium polystyrene  30 g Oral Once    gabapentin  300 mg Oral TID    ipratropium-albuterol  1 ampule Inhalation Q4H WA    cefepime  2,000 mg IntraVENous Q12H    sodium chloride flush  5-40 mL IntraVENous 2 times per day    hydroCHLOROthiazide  25 mg Oral Daily    lisinopril  20 mg Oral Daily    pantoprazole  40 mg Oral QAM AC        PRN:  fentanNYL, 50 mcg, Q2H PRN  diphenhydrAMINE, 25 mg, Q6H PRN  sodium chloride flush, 5-40 mL, PRN  sodium chloride, 25 mL, PRN  HYDROcodone-acetaminophen, 1 tablet, Q4H PRN  calcium carbonate, 500 mg, TID PRN  melatonin, 5 mg, Nightly PRN  naloxone, 0.4 mg, PRN  ondansetron, 4 mg, Q8H PRN   Or  ondansetron, 4 mg, Q6H PRN        Infusions:   sodium chloride 75 mL/hr at 08/31/21 1644    sodium chloride      nitroGLYCERIN 40 mcg/min (08/31/21 1705)    argatroban infusion 250 mg in 250 mL 2.5 mcg/kg/min (08/31/21 1819)       Laboratory Data:  Recent Labs     08/29/21  1726 08/30/21  0608 08/31/21  0518   WBC 22.6* 22.6* 17.1*   HGB 12.9* 11.0* 9.7*    167 140     Recent Labs     08/29/21  0719 08/30/21  0608 08/31/21  0518   * 130* 124*   K 5.0 5.5* 4.8   CL 98 99 88*   CO2 23 24 22   BUN 48* 46* 44*   CREATININE 1.2 1.2 1.1   GLUCOSE 267* 181* 193*     No results for input(s): AST, ALT, ALB, BILITOT, ALKPHOS in the last 72 hours. Troponin T:   No results for input(s): TROPONINI in the last 72 hours. Pro-BNP: No results for input(s): BNP in the last 72 hours. INR:   No results for input(s): INR in the last 72 hours. UA:No results for input(s): NITRITE, COLORU, PHUR, LABCAST, WBCUA, RBCUA, MUCUS, TRICHOMONAS, YEAST, BACTERIA, CLARITYU, SPECGRAV, LEUKOCYTESUR, UROBILINOGEN, BILIRUBINUR, BLOODU, GLUCOSEU, AMORPHOUS in the last 72 hours. Invalid input(s): Christina Odor  A1C: No results for input(s): LABA1C in the last 72 hours. ABG:No results for input(s): PHART, MMU7VBP, PO2ART, WKX9UMB, BEART, HGBAE, Q3UTAYEO, CARBOXHGBART in the last 72 hours. Imaging:    XR CHEST PORTABLE    Result Date: 8/28/2021  1. No radiographic evidence of acute cardiopulmonary process. Signed by Dr Gerardo Rodríguez:    Principal Problem:    Arterial embolism and thrombosis of upper extremity ([facility] [address]  Active Problems:    Hypertension    Ischemia of left upper extremity    GERD (gastroesophageal reflux disease), as per medication reconciliation    Black lung ([facility] [address]    History of tobacco abuse  Resolved Problems:    * No resolved hospital problems. *          Patient status post extensive vascular surgery as above . ..... POD # 3        Patient status post extensive vascular surgery as above . .....  POD # 2  Continue with current medications  Continue with the IV argatroban drip as per vascular surgery  Continue with the IV nitroglycerin drip as well for vasodilation  Strict I's and O's  Monitor renal functions  Patient lost around 1100 cc of blood during to vascular surgery procedure as above  Patient's hemoglobin level has dropped from 13.6-9.7 over the last few days  Iron studies ordered which are within acceptable level  Continue to monitor H&H closely and transfuse if hemoglobin is less than 7  Patient potassium level was borderline high at 5.5 yesterday which is now normal at 4.8  Sodium level is slowly downtrending which will be monitored closely  Patient given IV normal saline infusion which is now been stopped as he is eating and drinking well  Monitor labs and electrolytes closely  Vascular surgery, cardiology and oncology recommendations reviewed, appreciated and agreed with  Specialist on the case recommended patient to be transferred to tertiary care [place] for higher level of care  Vascular surgery spoke with the Dr. Miriam [place] at [facility] with accepted the patient  Patient is awaiting opening of bed at [facility] to initiate transfer  Continue with work-up as per specialists  Patient is undergoing hypercoagulable work-up  No preliminary evidence of any myxoma or atherogenic source heart as per cardiology  Follow-up closely with vascular surgery for further treatment recommendations  Patient is having persistent leukocytosis which is slowly downtrending on IV antibiotics  Continue with Maxipime 2 g IV twice daily for bacterial infection prophylaxis  vICU consult given for patient management in ICU      Repeat labs in a.m. Electrolyte replacement as per protocol. Patient will be monitored very closely on the floor. Further recommendations as per the hospital course.         Discharge Plan: [place] at [facility] in [place], [place] under care of microvascular hand surgeon Dr. Miriam [place] once bed is available      Patient  is on DVT prophylaxis  Current medications reviewed  Lab work reviewed  Radiology/Chest x-ray films reviewed  Treatment recommendations from suspecialities reviewed, appreciated and agreed with  Discussed with the nurse and addressed all questions/concerns  Discussed with Patient and/or Family at the bedside in detail . .. they understand and agree with the management plan. I attest that I spend >45 minutes engaged in critical care of this patient. This includes review of EMR data, imaging, bedside evaluation, patient/family counseling and coordination of care with nursing, providers and consultants. Maddie Blakely MD  8/31/2021 8:54 PM      DISCLAIMER: This note was created with electronic voice recognition which does have occasional errors. If you have any questions regarding the content within the note please do not hesitate to contact me. .. Thanks.

## 2021-09-01 NOTE — PROGRESS NOTES
Flight crew here report given. Gave patient Gabapentin   and Narco for left finger pain burning sensation. Report called and up date to [facility] @ [phone]. [facility].

## 2021-09-01 NOTE — PROGRESS NOTES
Received call from [facility] transfer center. Bed is available at [facility].   Number for report [phone]; bed number [id number]    Electronically signed by Loida Nichols RN on 9/1/2021 at 4:12 AM

## 2021-09-01 NOTE — PROGRESS NOTES
Spoke to Arlene with [employer]. Advises that a medic will be heading this way for pt transport.     Electronically signed by Danell Dance, RN on 9/1/2021 at 7:17 AM

## 2021-09-01 NOTE — PROGRESS NOTES
Pt awakened from sleep 2315 c/o severe pain in the left middle and index fingers. No change in pulse or color. Pt medicated with Fentanyl 50mg. Pt continued to c/o severe pain. MD notified and orders received to repeat Fentanyl and apply nitro paste topically to the hand. Pt's pain gradually improved. Norco given again at [id number]. Pt was able to rest after achieving pain relief for awhile. Pain intensity started to come back up at 0400. Fentanyl administered. Pt resting comfortably at this time.     Electronically signed by Koleen Boxer, RN on 9/1/2021 at 5:00 AM

## 2021-09-01 NOTE — CONSULTS
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 Patient was in his normal state of health till about a week ago when he started to experience severe pain in his left upper extremity and into his left hand. He was found with acute ischemia of the left upper extremity. He underwent exploration and thrombectomy of the brachial, ulnar and radial arteries. He continues to have more distal thrombosis and patient is now set to transfer to [address] [place] [address] for more distal vascular surgical management. Although his skin changes have improved since his surgery, patient continues to experience severe pain. He denies any major nausea. He has no vomiting or diarrhea. He has no known renal disease. On admission his serum sodium was 135 but has since slowly dropped. By August 29 his sodium was 131 and then on September 1 it was 121. Renal service was consulted to manage his hyponatremia. Patient currently denies any dizziness or lightheadedness. But he continues to have tingling and severe pain in his left upper extremity. He has no dysuria.       Past Medical/Surgical History      Active Ambulatory Problems     Diagnosis Date Noted    No Active Ambulatory Problems     Resolved Ambulatory Problems     Diagnosis Date Noted    No Resolved Ambulatory Problems     Past Medical History:   Diagnosis Date    Black lung ([address]     GERD (gastroesophageal reflux disease), as per medication reconciliation     History of tobacco abuse     Hypertension     Other chronic back pain          Review of Systems     Constitutional:  No weight loss, no fever/chills  Eyes:  No eye pain, no eye redness  Cardiovascular:  No chest pain, no worsening of edema  Respiratory:  No hemoptysis, no stidor  Gastrointestinal:  No blood in stool, no n/v, no diarrhea  Genitoruinary:  No hematuria, no difficulty with urination  Musculoskeletal:  No joint swelling, severe tingling and pain in left upper extremity extending to the fingers  Integumentary:  No Rash, no itching  Neurological:  No focal weakness, tingling sensation in left upper extremity  Psychiatric:  No depression, no confusion  Endocrine:  No polyuria, no polydipsia       Medications        Current Facility-Administered Medications:     nitroglycerin (NITRO-BID) 2 % ointment 0.5 inch, 0.5 inch, Topical, Q6H PRN, Machelle Nielson MD, 0.5 inch at 09/01/21 0742    heparin (porcine) injection 4,350 Units, 40 Units/kg, IntraVENous, PRN, Radames Haq, APRN    heparin 25,000 units in dextrose 5% 250 mL (premix) infusion, 5-30 Units/kg/hr, IntraVENous, Continuous, SANKET Penny, Last Rate: 19.6 mL/hr at 09/01/21 0954, 18 Units/kg/hr at 09/01/21 0954    heparin (porcine) injection 8,700 Units, 80 Units/kg, IntraVENous, PRN, Radames Haq, APRN    furosemide (LASIX) injection 20 mg, 20 mg, IntraVENous, Once, Isael Hanna MD    mirtazapine (REMERON SOL-TAB) disintegrating tablet 45 mg, 45 mg, Oral, Nightly, Isael Hanna MD, 45 mg at 08/31/21 2112    polyethylene glycol (GLYCOLAX) packet 17 g, 17 g, Oral, BID, Isael Hanna MD, 17 g at 08/31/21 2112    metoprolol tartrate (LOPRESSOR) tablet 25 mg, 25 mg, Oral, BID, Mark Zhu DO, 25 mg at 08/31/21 2109    fentaNYL (SUBLIMAZE) injection 50 mcg, 50 mcg, IntraVENous, Q2H PRN, Mark Zhu DO, 50 mcg at 09/01/21 0858    sodium polystyrene (KAYEXALATE) 15 GM/60ML suspension 30 g, 30 g, Oral, Once, Isael Hanna MD    gabapentin (NEURONTIN) capsule 300 mg, 300 mg, Oral, TID, Mark Zhu DO, 300 mg at 09/01/21 0530    diphenhydrAMINE (BENADRYL) injection 25 mg, 25 mg, IntraVENous, Q6H PRN, Mark Zhu DO, 25 mg at 09/01/21 0120    ipratropium-albuterol (DUONEB) nebulizer solution 1 ampule, 1 ampule, Inhalation, Q4H WA, Isael Hanna MD, 1 ampule at 09/01/21 1054    ceFEPIme (MAXIPIME) 2,000 mg in sterile water 20 mL IV syringe, 2,000 mg, IntraVENous, Q12H, Carleen Sjogren, DO, 2,000 mg at 08/31/21 2133    sodium chloride flush 0.9 % injection 5-40 mL, 5-40 mL, IntraVENous, 2 times per day, Carleen Sjogren, DO, 10 mL at 08/31/21 2145    sodium chloride flush 0.9 % injection 5-40 mL, 5-40 mL, IntraVENous, PRN, Carleen Sjogren, DO    0.9 % sodium chloride infusion, 25 mL, IntraVENous, PRN, Carleen Sjogren, DO    nitroGLYCERIN 50 mg in dextrose 5% 250 mL infusion, 5-200 mcg/min, IntraVENous, Continuous, Carleen Sjogren, DO, Last Rate: 12 mL/hr at 08/31/21 2154, 40 mcg/min at 08/31/21 2154    hydroCHLOROthiazide (HYDRODIURIL) tablet 25 mg, 25 mg, Oral, Daily, Carleen Sjogren, DO, 25 mg at 08/31/21 0743    lisinopril (PRINIVIL;ZESTRIL) tablet 20 mg, 20 mg, Oral, Daily, Carleen Khanogren, DO, 20 mg at 08/31/21 1837    pantoprazole (PROTONIX) tablet 40 mg, 40 mg, Oral, QAM AC, Carleen Sjogren, DO, 40 mg at 09/01/21 0530    HYDROcodone-acetaminophen (NORCO) 7.5-325 MG per tablet 1 tablet, 1 tablet, Oral, Q4H PRN, Carleen Sjogren, DO, 1 tablet at 09/01/21 0156    calcium carbonate (TUMS) chewable tablet 500 mg, 500 mg, Oral, TID PRN, Carleen Sjogren, DO, 500 mg at 08/29/21 0436    melatonin disintegrating tablet 5 mg, 5 mg, Oral, Nightly PRN, Carleen Sjogren, DO, 5 mg at 08/31/21 2109    naloxone [facility]) injection 0.4 mg, 0.4 mg, IntraVENous, PRN, Carleen Smith DO    ondansetron (ZOFRAN-ODT) disintegrating tablet 4 mg, 4 mg, Oral, Q8H PRN **OR** ondansetron (ZOFRAN) injection 4 mg, 4 mg, IntraVENous, Q6H PRN, Carleen Smith DO  Outpatient Medications Marked as Taking for the 8/27/21 encounter [facility] Encounter)   Medication Sig Dispense Refill    lisinopril (PRINIVIL;ZESTRIL) 20 MG tablet Take 20 mg by mouth daily      mirtazapine (REMERON) 15 MG tablet Take 45 mg by mouth nightly      naproxen (NAPROSYN) 375 MG tablet Take 375 mg by mouth 2 times daily (with meals)      omeprazole (PRILOSEC) 20 MG delayed release capsule Take 20 mg by mouth daily      hydroCHLOROthiazide (HYDRODIURIL) 25 MG tablet Take 25 mg by mouth daily         Allergies   Patient has no known allergies. Family History       Family History   Problem Relation Age of Onset    Heart Disease Mother     Other Mother         tobacco    Heart Disease Father     Other Father         tobacco    Cancer Father     Diabetes Sister     Heart Disease Sister     Other Sister         smoker    Dementia Sister     COPD Sister         smoker    COPD Sister         smoker    Cancer Sister     COPD Sister         heavy smoker    Drug Abuse Brother     No Known Problems Daughter     Heart Disease Daughter         congenital from the Mother's side     Family history negative for kidney disease.      Social History      Social History     Socioeconomic History    Marital status:      Spouse name: Mrs. Miriam Jones    Number of children: 2    Years of education: None    Highest education level: None   Occupational History    Occupation:      Comment: disabled , worked 32.5 years   Tobacco Use    Smoking status: Former Smoker     Packs/day: 1.00     Years: 12.00     Pack years: 12.00     Types: Cigarettes     Quit date:      Years since quittin.6    Smokeless tobacco: Never Used    Tobacco comment: smoked from age [age], smoked at 1 [facility], tried 2 cigars only in his life   Vaping Use    Vaping Use: Never used   Substance and Sexual Activity    Alcohol use: Yes     Comment: has about a 12 pack per year    Drug use: Not Currently     Types: Marijuana     Comment: stopped in  or  when they started drug testing    Sexual activity: None     Comment: 2 daughters   Other Topics Concern    None   Social History Narrative    CODE STATUS: Full Code    HEALTH CARE PROXY: Mrs. Mary Tyson, his wife, [phone]    AMBULATES: independently normally    DOMICILED: lives in a private home, lives alone with his wife, has a yorkie, no stairs in the home     Social Determinants of Health     Financial Resource Strain:     Difficulty of Paying Living Expenses:    Food Insecurity:     Worried About Running Out of Food in the Last Year:     [address] in the Last Year:    Transportation Needs:     Lack of Transportation (Medical):  Lack of Transportation (Non-Medical):    Physical Activity:     Days of Exercise per Week:     Minutes of Exercise per Session:    Stress:     Feeling of Stress :    Social Connections:     Frequency of Communication with Friends and Family:     Frequency of Social Gatherings with Friends and Family:     Attends Taoism Services:     Active Member of Clubs or Organizations:     Attends Club or Organization Meetings:     Marital Status:    Intimate Partner Violence:     Fear of Current or Ex-Partner:     Emotionally Abused:     Physically Abused:     Sexually Abused:        Physical Exam     Blood pressure (!) 99/46, pulse 80, temperature 98.4 °F (36.9 °C), temperature source Temporal, resp. rate 13, height 5' 8\" (1.727 m), weight 239 lb 12.8 oz (108.8 kg), SpO2 98 %.     General:  NAD, A+Ox3, ill-appearing, normal body habitus  HEENT: Atraumatic, normocephalic  Neck:  Supple, normal range of movement  Chest:  CTAB, good respiratory effort, good air movement  CV:  RRR, no murmurs   Abdomen:  NTND, soft, +BS, no hepatosplenomegaly  Extremities:  No peripheral edema  Neurological:  Moving all four extremities  Lymphatics:  No palpable lymph nodes   Skin:  No rash, no jaundice  Psychiatric:  Normal insight and judgement, good recall    Data     Recent Labs     08/30/21  0608 08/31/21  0518 09/01/21  0446   WBC 22.6* 17.1* 18.3*   HGB 11.0* 9.7* 9.4*   HCT 33.9* 28.8* 27.2*   MCV 92.4 88.1 88.6    140 115*     Recent Labs     08/30/21  0608 08/31/21  0518 09/01/21  0446   * 124* 121*   K 5.5* 4.8 5.0   CL 99 88* 89*   CO2 24 22 25   GLUCOSE 181* 193* 141*   BUN 46* 44* 32*   CREATININE 1.2 1.1 0.8   LABGLOM >60 >60 >60   GFRAA >59 >59 >59       Assessment:  1. Hyponatremia  2. Left upper extremity arterial thrombosis  3. Hyperkalemia  4. Acute kidney injury on presentation-has since improved  5. Hypocalcemia  6. Leukocytosis  7. Hyperglycemia      Plan:  · At this stage, patient may benefit from diuretics and fluid restriction. I agree with adding salt tablets. His hyponatremia is very likely related to SIADH secondary of pain and use of HCTZ. We will also check his TSH level, TSH and vitamin D level. Serum potassium has improved. Would hold HCTZ for now.     Thank you for asking us to participate in the management of your patient, please do not hesitate to contact me for any concerns regarding my recommendations as outlined above.    -----------------------------  Electronically signed by Joanie An MD on 9/1/21 at 11:08 AM CDT

## 2021-09-01 NOTE — PROGRESS NOTES
Report called to Scottie at [facility].     Electronically signed by Loida Nichols RN on 9/1/2021 at 7:08 AM

## 2021-09-01 NOTE — PROGRESS NOTES
Received call from [facility] requesting pt info and face sheet. She advised that pt would be added to their board. Face faxed at [phone] and confirmation report received.     Electronically signed by Kesha Cisneros RN on 9/1/2021 at 7:14 AM

## 2021-09-01 NOTE — PROGRESS NOTES
Spoke to Fabian Marquez with transfer center to notify her that [facility] has bed available. She is working to arrange transport.     Electronically signed by Corazon White RN on 9/1/2021 at 4:21 AM

## 2021-09-04 LAB
BLOOD CULTURE, ROUTINE: NORMAL
CULTURE, BLOOD 2: NORMAL

## 2021-10-05 NOTE — CONSULTS
**Physician Signature**  This document was electronically signed by: Lorena Chance MD  2021   11:25 AM    **Consult Information**  Member Facility: [address] [place] [address] Drive MRN: [id number]  Visit/Encounter Number: [id number]  Consult ID: [id number]  Facility Time Zone: CT  Date and Time of Request: 2021 10:15 AM  CT  Requesting Clinician: DR BAKRE  Patient Name: Daquan Servin  Date of Birth:   Gender: Male  Patient identity was confirmed at the beginning of the consult with the   patient/family/staff using two personal identifiers: Patient name and       **Reason for Consult**  Reason for Consult: [facility]    **Admission**  Admission Date: 2021  Chief reason for ICU admission: post vasc surg    **Core Metrics**  General orienting sentence for patient: 66M s/p LUE revasc procedure Former   smoker, thrombectomy with repeat surgical intervention, unsuccessful   revasc, awaiting transfer to tertiary facility, ? [place]. No radial or   ulnar pulses. Dusky thumb and index finger, cool but not cold. Neuropathic   pain.   Chief physiologic deterioration: Severe limb ischemia  Is the patient on DVT prophylaxis?: Yes  Prophylaxis type: Pharmacological  DVT Prophylaxis Comments: Argatroban  Is the patient on GI prophylaxis?: Not Indicated  Has this patient reached their nutritional goal?: Yes  Are there current issues with pain management in this patient?: Yes and   issues are being addressed  Pain management notes: Neurontin and narcotics  Are there issues with skin integrity?: Yes and issues are being   addressed  Skin Integrity Details: Incision in upper arm serosang drainage  Are there issues with delirium?: No  Has the patient been mobilized?: Yes  Is this patient currently intubated?: No  Are there ethical or care philosophy or family issues?: No  Do you recommend an in depth evaluation?: No  Disposition Recommendations: Transition/transfer to a higher level of care    **Inserted/Removed Devices**  Device Name: Royal Catheter  Site of insertion: Urethra  Date of insertion: 08-    **Physician Signature**  This document was electronically signed by: Estrella Cruz MD  08/31/2021   11:25 AM

## 2021-10-05 NOTE — CONSULTS
**Physician Signature**  This document was electronically signed by: Ian Hogue MD  2021   10:07 PM    **Consult Information**  Member Facility: [address] [place] [address] Drive MRN: [id number]  Visit/Encounter Number: [id number]  Consult ID: [id number]  Facility Time Zone: CT  Date and Time of Request: 2021 06:36 PM  CT  Requesting Clinician: DR BAKER  Patient Name: Shyam Cisneros  Date of Birth:   Gender: Male  Patient identity was confirmed at the beginning of the consult with the   patient/family/staff using two personal identifiers: Patient name and       **Reason for Consult**  Reason for Consult: [facility]    **Admission**  Admission Date: 2021  Chief reason for ICU admission: post vasc surg    **Core Metrics**  General orienting sentence for patient: 66M s/p LUE revasc procedure Former   smoker, thrombectomy with repeat surgical intervention, unsuccessful   revasc, awaiting transfer to tertiary facility, ? [place]. No radial or   ulnar pulses. Dusky thumb and index finger, cool but not cold. Neuropathic   pain.  another facility, on NGT gtt and arbatroban  Chief physiologic deterioration: Severe limb ischemia  Is the patient on DVT prophylaxis?: Yes  Prophylaxis type: Pharmacological  DVT Prophylaxis Comments: Argatroban  Is the patient on GI prophylaxis?: Not Indicated  Has this patient reached their nutritional goal?: Yes  Are there current issues with pain management in this patient?: Yes and   issues are being addressed  Pain management notes: Neurontin and narcotics  Are there issues with skin integrity?: Yes and issues are being   addressed  Skin Integrity Details: Incision in upper arm serosang drainage  Are there issues with delirium?: No  Has the patient been mobilized?: Yes  Is this patient currently intubated?: No  Are there ethical or care philosophy or family issues?: No  Do you recommend an in depth evaluation?: No  Disposition Recommendations: Continue ICU level of care    **Inserted/Removed Devices**  Device Name: Royal Catheter  Site of insertion: Urethra  Date of insertion: 08-    **Physician Signature**  This document was electronically signed by: Genaro Yu MD  08/31/2021   10:07 PM

## 2021-10-05 NOTE — CONSULTS
**Physician Signature**  This document was electronically signed by: Canelo Katz MD  2021   11:23 AM    **Consult Information**  Member Facility: [address] [place] [address] Drive MRN: [id number]  Visit/Encounter Number: [id number]  Consult ID: [id number]  Facility Time Zone: CT  Date and Time of Request: 2021 09:35 AM  CT  Requesting Clinician: DR BAKER  Patient Name: Dede Tapia  YOB: 1955  Gender: Male  Patient identity was confirmed at the beginning of the consult with the   patient/family/staff using two personal identifiers: Patient name and       **Reason for Consult**  Reason for Consult: [facility]    **Admission**  Admission Date: 2021  Chief reason for ICU admission: post vasc surg    **Core Metrics**  General orienting sentence for patient: 66M s/p LUE revasc procedure Former   smoker, thrombectomy with repeat surgical intervention, unsuccessful   revasc, awaiting transfer to tertiary facility, ? [place]. No radial or   ulnar pulses. Dusky thumb and index finger, cool but not cold. Neuropathic   pain. another facility, on NGT gtt and arbatroban. [place].   Chief physiologic deterioration: Severe limb ischemia  Is the patient on DVT prophylaxis?: Yes  Prophylaxis type: Pharmacological  DVT Prophylaxis Comments: heparin  Is the patient on GI prophylaxis?: Not Indicated  Has this patient reached their nutritional goal?: Yes  Are there current issues with pain management in this patient?: Yes and   issues are being addressed  Pain management notes: Neurontin and narcotics  Are there issues with skin integrity?: Yes and issues are being   addressed  Skin Integrity Details: Incision in upper arm serosang drainage  Are there issues with delirium?: No  Has the patient been mobilized?: Yes  Is this patient currently intubated?: No  Are there ethical or care philosophy or family issues?: No  Do you recommend an in depth evaluation?: No  Disposition Recommendations:

## 2022-05-19 ENCOUNTER — TRANSCRIBE ORDERS (OUTPATIENT)
Dept: ADMINISTRATIVE | Facility: HOSPITAL | Age: 67
End: 2022-05-19

## 2022-09-07 LAB
GLUCOSE BLD-MCNC: 214 MG/DL (ref 70–99)
PERFORMED ON: ABNORMAL

## 2022-09-07 PROCEDURE — 82947 ASSAY GLUCOSE BLOOD QUANT: CPT

## 2022-09-12 ENCOUNTER — HOSPITAL ENCOUNTER (OUTPATIENT)
Dept: NUCLEAR MEDICINE | Age: 67
Discharge: HOME | End: 2022-09-14
Payer: OTHER GOVERNMENT

## 2022-09-12 ENCOUNTER — HOSPITAL ENCOUNTER (OUTPATIENT)
Dept: NUCLEAR MEDICINE | Age: 67
Discharge: HOME | End: 2022-09-09
Payer: OTHER GOVERNMENT

## 2022-09-12 DIAGNOSIS — R91.1 SOLITARY PULMONARY NODULE: ICD-10-CM

## 2022-09-12 LAB
GLUCOSE BLD-MCNC: 107 MG/DL (ref 70–99)
PERFORMED ON: ABNORMAL

## 2022-09-12 PROCEDURE — 78815 PET IMAGE W/CT SKULL-THIGH: CPT

## 2022-09-12 PROCEDURE — 82947 ASSAY GLUCOSE BLOOD QUANT: CPT

## 2022-09-12 PROCEDURE — 3430000000 HC RX DIAGNOSTIC RADIOPHARMACEUTICAL: Performed by: INTERNAL MEDICINE

## 2022-09-12 PROCEDURE — A9552 F18 FDG: HCPCS | Performed by: INTERNAL MEDICINE

## 2022-09-12 RX ORDER — FLUDEOXYGLUCOSE F 18 200 MCI/ML
10 INJECTION, SOLUTION INTRAVENOUS
Status: COMPLETED | OUTPATIENT
Start: 2022-09-12 | End: 2022-09-12

## 2022-09-12 RX ADMIN — FLUDEOXYGLUCOSE F 18 10 MILLICURIE: 200 INJECTION, SOLUTION INTRAVENOUS at 14:21

## 2022-09-14 RX ORDER — GABAPENTIN 300 MG/1
300 CAPSULE ORAL 3 TIMES DAILY
COMMUNITY

## 2022-09-14 RX ORDER — LISINOPRIL 10 MG/1
10 TABLET ORAL DAILY
COMMUNITY

## 2022-09-14 RX ORDER — OMEPRAZOLE 20 MG/1
20 CAPSULE, DELAYED RELEASE ORAL NIGHTLY
COMMUNITY

## 2022-09-14 RX ORDER — DIPHENHYDRAMINE HCL 25 MG
25 CAPSULE ORAL EVERY 6 HOURS PRN
Status: ON HOLD | COMMUNITY
End: 2023-01-30

## 2022-09-14 RX ORDER — HYDROCODONE BITARTRATE AND ACETAMINOPHEN 10; 325 MG/1; MG/1
1 TABLET ORAL 2 TIMES DAILY
COMMUNITY

## 2022-09-14 RX ORDER — MIRTAZAPINE 30 MG/1
60 TABLET, FILM COATED ORAL NIGHTLY
COMMUNITY
End: 2023-01-29

## 2022-09-14 RX ORDER — ATORVASTATIN CALCIUM 80 MG/1
80 TABLET, FILM COATED ORAL NIGHTLY
COMMUNITY

## 2022-09-14 RX ORDER — MAGNESIUM GLUCONATE 27 MG(500)
500 TABLET ORAL 2 TIMES DAILY
Status: ON HOLD | COMMUNITY
End: 2023-01-30

## 2022-09-15 ENCOUNTER — ANESTHESIA EVENT (OUTPATIENT)
Dept: PERIOP | Facility: HOSPITAL | Age: 67
End: 2022-09-15

## 2022-09-16 ENCOUNTER — ANESTHESIA (OUTPATIENT)
Dept: PERIOP | Facility: HOSPITAL | Age: 67
End: 2022-09-16

## 2022-09-16 ENCOUNTER — HOSPITAL ENCOUNTER (OUTPATIENT)
Facility: HOSPITAL | Age: 67
Setting detail: HOSPITAL OUTPATIENT SURGERY
Discharge: HOME | End: 2022-09-16
Attending: OPHTHALMOLOGY | Admitting: OPHTHALMOLOGY

## 2022-09-16 VITALS
TEMPERATURE: 97.7 F | WEIGHT: 232.81 LBS | BODY MASS INDEX: 35.28 KG/M2 | RESPIRATION RATE: 18 BRPM | DIASTOLIC BLOOD PRESSURE: 69 MMHG | SYSTOLIC BLOOD PRESSURE: 136 MMHG | HEIGHT: 68 IN | OXYGEN SATURATION: 94 % | HEART RATE: 97 BPM

## 2022-09-16 PROCEDURE — 25010000002 MIDAZOLAM PER 1 MG: Performed by: NURSE ANESTHETIST, CERTIFIED REGISTERED

## 2022-09-16 PROCEDURE — V2632 POST CHMBR INTRAOCULAR LENS: HCPCS | Performed by: OPHTHALMOLOGY

## 2022-09-16 PROCEDURE — 25010000002 VANCOMYCIN 1 G RECONSTITUTED SOLUTION 1 EACH VIAL: Performed by: OPHTHALMOLOGY

## 2022-09-16 PROCEDURE — 25010000002 EPINEPHRINE PER 0.1 MG: Performed by: OPHTHALMOLOGY

## 2022-09-16 PROCEDURE — 25010000002 FENTANYL CITRATE (PF) 50 MCG/ML SOLUTION: Performed by: NURSE ANESTHETIST, CERTIFIED REGISTERED

## 2022-09-16 DEVICE — IMPLANTABLE DEVICE: Type: IMPLANTABLE DEVICE | Site: EYE | Status: FUNCTIONAL

## 2022-09-16 RX ORDER — FENTANYL CITRATE 50 UG/ML
INJECTION, SOLUTION INTRAMUSCULAR; INTRAVENOUS AS NEEDED
Status: DISCONTINUED | OUTPATIENT
Start: 2022-09-16 | End: 2022-09-16 | Stop reason: SURG

## 2022-09-16 RX ORDER — BRIMONIDINE TARTRATE 0.15 %
DROPS OPHTHALMIC (EYE) AS NEEDED
Status: DISCONTINUED | OUTPATIENT
Start: 2022-09-16 | End: 2022-09-16 | Stop reason: HOSPADM

## 2022-09-16 RX ORDER — SODIUM CHLORIDE 0.9 % (FLUSH) 0.9 %
10 SYRINGE (ML) INJECTION AS NEEDED
Status: DISCONTINUED | OUTPATIENT
Start: 2022-09-16 | End: 2022-09-16 | Stop reason: HOSPADM

## 2022-09-16 RX ORDER — TETRACAINE HYDROCHLORIDE 5 MG/ML
SOLUTION OPHTHALMIC AS NEEDED
Status: DISCONTINUED | OUTPATIENT
Start: 2022-09-16 | End: 2022-09-16 | Stop reason: HOSPADM

## 2022-09-16 RX ORDER — PHENYLEPHRINE HCL 2.5 %
1 DROPS OPHTHALMIC (EYE)
Status: COMPLETED | OUTPATIENT
Start: 2022-09-16 | End: 2022-09-16

## 2022-09-16 RX ORDER — MIDAZOLAM HYDROCHLORIDE 1 MG/ML
INJECTION INTRAMUSCULAR; INTRAVENOUS AS NEEDED
Status: DISCONTINUED | OUTPATIENT
Start: 2022-09-16 | End: 2022-09-16 | Stop reason: SURG

## 2022-09-16 RX ORDER — CYCLOPENTOLATE HYDROCHLORIDE 20 MG/ML
1 SOLUTION/ DROPS OPHTHALMIC
Status: COMPLETED | OUTPATIENT
Start: 2022-09-16 | End: 2022-09-16

## 2022-09-16 RX ORDER — TETRACAINE HYDROCHLORIDE 5 MG/ML
1 SOLUTION OPHTHALMIC
Status: COMPLETED | OUTPATIENT
Start: 2022-09-16 | End: 2022-09-16

## 2022-09-16 RX ORDER — PREDNISOLONE ACETATE 10 MG/ML
SUSPENSION/ DROPS OPHTHALMIC AS NEEDED
Status: DISCONTINUED | OUTPATIENT
Start: 2022-09-16 | End: 2022-09-16 | Stop reason: HOSPADM

## 2022-09-16 RX ORDER — MOXIFLOXACIN 5 MG/ML
SOLUTION/ DROPS OPHTHALMIC AS NEEDED
Status: DISCONTINUED | OUTPATIENT
Start: 2022-09-16 | End: 2022-09-16 | Stop reason: HOSPADM

## 2022-09-16 RX ADMIN — PHENYLEPHRINE HYDROCHLORIDE 1 DROP: 25 SOLUTION/ DROPS OPHTHALMIC at 06:22

## 2022-09-16 RX ADMIN — PHENYLEPHRINE HYDROCHLORIDE 1 DROP: 25 SOLUTION/ DROPS OPHTHALMIC at 06:42

## 2022-09-16 RX ADMIN — TETRACAINE HYDROCHLORIDE 1 DROP: 5 SOLUTION OPHTHALMIC at 06:42

## 2022-09-16 RX ADMIN — CYCLOPENTOLATE HYDROCHLORIDE 1 DROP: 20 SOLUTION/ DROPS OPHTHALMIC at 06:42

## 2022-09-16 RX ADMIN — CYCLOPENTOLATE HYDROCHLORIDE 1 DROP: 20 SOLUTION/ DROPS OPHTHALMIC at 06:22

## 2022-09-16 RX ADMIN — FENTANYL CITRATE 50 MCG: 50 INJECTION INTRAMUSCULAR; INTRAVENOUS at 08:03

## 2022-09-16 RX ADMIN — PHENYLEPHRINE HYDROCHLORIDE 1 DROP: 25 SOLUTION/ DROPS OPHTHALMIC at 06:32

## 2022-09-16 RX ADMIN — FENTANYL CITRATE 50 MCG: 50 INJECTION INTRAMUSCULAR; INTRAVENOUS at 08:00

## 2022-09-16 RX ADMIN — Medication 10 ML: at 06:35

## 2022-09-16 RX ADMIN — MIDAZOLAM HYDROCHLORIDE 1 MG: 1 INJECTION, SOLUTION INTRAMUSCULAR; INTRAVENOUS at 08:00

## 2022-09-16 RX ADMIN — TETRACAINE HYDROCHLORIDE 1 DROP: 5 SOLUTION OPHTHALMIC at 06:22

## 2022-09-16 RX ADMIN — CYCLOPENTOLATE HYDROCHLORIDE 1 DROP: 20 SOLUTION/ DROPS OPHTHALMIC at 06:32

## 2022-09-22 ENCOUNTER — ANESTHESIA EVENT (OUTPATIENT)
Dept: PERIOP | Facility: HOSPITAL | Age: 67
End: 2022-09-22

## 2022-09-23 ENCOUNTER — ANESTHESIA (OUTPATIENT)
Dept: PERIOP | Facility: HOSPITAL | Age: 67
End: 2022-09-23

## 2022-09-23 ENCOUNTER — HOSPITAL ENCOUNTER (OUTPATIENT)
Facility: HOSPITAL | Age: 67
Setting detail: HOSPITAL OUTPATIENT SURGERY
Discharge: HOME | End: 2022-09-23
Attending: OPHTHALMOLOGY | Admitting: OPHTHALMOLOGY

## 2022-09-23 VITALS
SYSTOLIC BLOOD PRESSURE: 157 MMHG | TEMPERATURE: 97 F | DIASTOLIC BLOOD PRESSURE: 78 MMHG | OXYGEN SATURATION: 91 % | BODY MASS INDEX: 34.68 KG/M2 | RESPIRATION RATE: 18 BRPM | WEIGHT: 228.84 LBS | HEART RATE: 86 BPM | HEIGHT: 68 IN

## 2022-09-23 PROCEDURE — 25010000002 EPINEPHRINE PER 0.1 MG: Performed by: OPHTHALMOLOGY

## 2022-09-23 PROCEDURE — 25010000002 FENTANYL CITRATE (PF) 50 MCG/ML SOLUTION

## 2022-09-23 PROCEDURE — 25010000002 VANCOMYCIN 1 G RECONSTITUTED SOLUTION 1 EACH VIAL: Performed by: OPHTHALMOLOGY

## 2022-09-23 PROCEDURE — V2632 POST CHMBR INTRAOCULAR LENS: HCPCS | Performed by: OPHTHALMOLOGY

## 2022-09-23 PROCEDURE — 25010000002 MIDAZOLAM PER 1 MG

## 2022-09-23 DEVICE — LENS ACRYSOF IQ 6X13MM SN60WF 26.5: Type: IMPLANTABLE DEVICE | Site: EYE | Status: FUNCTIONAL

## 2022-09-23 RX ORDER — MEPERIDINE HYDROCHLORIDE 25 MG/ML
12.5 INJECTION INTRAMUSCULAR; INTRAVENOUS; SUBCUTANEOUS
Status: DISCONTINUED | OUTPATIENT
Start: 2022-09-23 | End: 2022-09-23 | Stop reason: HOSPADM

## 2022-09-23 RX ORDER — BRIMONIDINE TARTRATE 0.15 %
DROPS OPHTHALMIC (EYE) AS NEEDED
Status: DISCONTINUED | OUTPATIENT
Start: 2022-09-23 | End: 2022-09-23 | Stop reason: HOSPADM

## 2022-09-23 RX ORDER — PREDNISOLONE ACETATE 10 MG/ML
SUSPENSION/ DROPS OPHTHALMIC AS NEEDED
Status: DISCONTINUED | OUTPATIENT
Start: 2022-09-23 | End: 2022-09-23 | Stop reason: HOSPADM

## 2022-09-23 RX ORDER — CYCLOPENTOLATE HYDROCHLORIDE 20 MG/ML
1 SOLUTION/ DROPS OPHTHALMIC
Status: ACTIVE | OUTPATIENT
Start: 2022-09-23 | End: 2022-09-23

## 2022-09-23 RX ORDER — PROMETHAZINE HYDROCHLORIDE 25 MG/1
25 SUPPOSITORY RECTAL ONCE AS NEEDED
Status: DISCONTINUED | OUTPATIENT
Start: 2022-09-23 | End: 2022-09-23 | Stop reason: HOSPADM

## 2022-09-23 RX ORDER — MIDAZOLAM HYDROCHLORIDE 1 MG/ML
INJECTION INTRAMUSCULAR; INTRAVENOUS AS NEEDED
Status: DISCONTINUED | OUTPATIENT
Start: 2022-09-23 | End: 2022-09-23 | Stop reason: SURG

## 2022-09-23 RX ORDER — FENTANYL CITRATE 50 UG/ML
INJECTION, SOLUTION INTRAMUSCULAR; INTRAVENOUS AS NEEDED
Status: DISCONTINUED | OUTPATIENT
Start: 2022-09-23 | End: 2022-09-23 | Stop reason: SURG

## 2022-09-23 RX ORDER — SODIUM CHLORIDE 0.9 % (FLUSH) 0.9 %
10 SYRINGE (ML) INJECTION AS NEEDED
Status: DISCONTINUED | OUTPATIENT
Start: 2022-09-23 | End: 2022-09-23 | Stop reason: HOSPADM

## 2022-09-23 RX ORDER — PHENYLEPHRINE HCL 2.5 %
1 DROPS OPHTHALMIC (EYE)
Status: ACTIVE | OUTPATIENT
Start: 2022-09-23 | End: 2022-09-23

## 2022-09-23 RX ORDER — PROMETHAZINE HYDROCHLORIDE 25 MG/1
25 TABLET ORAL ONCE AS NEEDED
Status: DISCONTINUED | OUTPATIENT
Start: 2022-09-23 | End: 2022-09-23 | Stop reason: HOSPADM

## 2022-09-23 RX ORDER — TETRACAINE HYDROCHLORIDE 5 MG/ML
SOLUTION OPHTHALMIC AS NEEDED
Status: DISCONTINUED | OUTPATIENT
Start: 2022-09-23 | End: 2022-09-23 | Stop reason: HOSPADM

## 2022-09-23 RX ORDER — TETRACAINE HYDROCHLORIDE 5 MG/ML
1 SOLUTION OPHTHALMIC
Status: COMPLETED | OUTPATIENT
Start: 2022-09-23 | End: 2022-09-23

## 2022-09-23 RX ORDER — MOXIFLOXACIN 5 MG/ML
SOLUTION/ DROPS OPHTHALMIC AS NEEDED
Status: DISCONTINUED | OUTPATIENT
Start: 2022-09-23 | End: 2022-09-23 | Stop reason: HOSPADM

## 2022-09-23 RX ORDER — ONDANSETRON 2 MG/ML
4 INJECTION INTRAMUSCULAR; INTRAVENOUS ONCE AS NEEDED
Status: DISCONTINUED | OUTPATIENT
Start: 2022-09-23 | End: 2022-09-23 | Stop reason: HOSPADM

## 2022-09-23 RX ADMIN — CYCLOPENTOLATE HYDROCHLORIDE 1 DROP: 20 SOLUTION/ DROPS OPHTHALMIC at 07:17

## 2022-09-23 RX ADMIN — CYCLOPENTOLATE HYDROCHLORIDE 1 DROP: 20 SOLUTION/ DROPS OPHTHALMIC at 07:04

## 2022-09-23 RX ADMIN — CYCLOPENTOLATE HYDROCHLORIDE 1 DROP: 20 SOLUTION/ DROPS OPHTHALMIC at 06:54

## 2022-09-23 RX ADMIN — PHENYLEPHRINE HYDROCHLORIDE 1 DROP: 25 SOLUTION/ DROPS OPHTHALMIC at 06:54

## 2022-09-23 RX ADMIN — TETRACAINE HYDROCHLORIDE 1 DROP: 5 SOLUTION OPHTHALMIC at 07:17

## 2022-09-23 RX ADMIN — FENTANYL CITRATE 50 MCG: 50 INJECTION INTRAMUSCULAR; INTRAVENOUS at 08:20

## 2022-09-23 RX ADMIN — MIDAZOLAM HYDROCHLORIDE 1 MG: 1 INJECTION, SOLUTION INTRAMUSCULAR; INTRAVENOUS at 08:20

## 2022-09-23 RX ADMIN — PHENYLEPHRINE HYDROCHLORIDE 1 DROP: 25 SOLUTION/ DROPS OPHTHALMIC at 07:17

## 2022-09-23 RX ADMIN — Medication 10 ML: at 07:04

## 2022-09-23 RX ADMIN — TETRACAINE HYDROCHLORIDE 1 DROP: 5 SOLUTION OPHTHALMIC at 06:54

## 2022-09-23 RX ADMIN — FENTANYL CITRATE 50 MCG: 50 INJECTION INTRAMUSCULAR; INTRAVENOUS at 08:25

## 2022-09-23 RX ADMIN — PHENYLEPHRINE HYDROCHLORIDE 1 DROP: 25 SOLUTION/ DROPS OPHTHALMIC at 07:04

## 2023-01-29 ENCOUNTER — APPOINTMENT (OUTPATIENT)
Dept: GENERAL RADIOLOGY | Facility: HOSPITAL | Age: 68
DRG: 871 | End: 2023-01-29
Payer: OTHER GOVERNMENT

## 2023-01-29 ENCOUNTER — HOSPITAL ENCOUNTER (INPATIENT)
Facility: HOSPITAL | Age: 68
LOS: 4 days | Discharge: HOME | DRG: 871 | End: 2023-02-02
Attending: FAMILY MEDICINE | Admitting: FAMILY MEDICINE
Payer: OTHER GOVERNMENT

## 2023-01-29 DIAGNOSIS — G47.33 OSA (OBSTRUCTIVE SLEEP APNEA): ICD-10-CM

## 2023-01-29 DIAGNOSIS — K21.9 GERD WITHOUT ESOPHAGITIS: ICD-10-CM

## 2023-01-29 DIAGNOSIS — J15.9 SECONDARY BACTERIAL PNEUMONIA: ICD-10-CM

## 2023-01-29 DIAGNOSIS — J44.9 CHRONIC OBSTRUCTIVE PULMONARY DISEASE, UNSPECIFIED COPD TYPE: ICD-10-CM

## 2023-01-29 DIAGNOSIS — A41.9 SEPTIC SHOCK: ICD-10-CM

## 2023-01-29 DIAGNOSIS — N17.9 ACUTE RENAL FAILURE, UNSPECIFIED ACUTE RENAL FAILURE TYPE: ICD-10-CM

## 2023-01-29 DIAGNOSIS — E78.5 HYPERLIPIDEMIA, UNSPECIFIED HYPERLIPIDEMIA TYPE: ICD-10-CM

## 2023-01-29 DIAGNOSIS — Z74.09 IMPAIRED MOBILITY: ICD-10-CM

## 2023-01-29 DIAGNOSIS — I10 ESSENTIAL HYPERTENSION: ICD-10-CM

## 2023-01-29 DIAGNOSIS — J96.01 ACUTE RESPIRATORY FAILURE WITH HYPOXIA AND HYPERCAPNIA: ICD-10-CM

## 2023-01-29 DIAGNOSIS — R65.21 SEPTIC SHOCK: ICD-10-CM

## 2023-01-29 DIAGNOSIS — J96.02 ACUTE RESPIRATORY FAILURE WITH HYPOXIA AND HYPERCAPNIA: ICD-10-CM

## 2023-01-29 DIAGNOSIS — U07.1 COVID-19: Primary | ICD-10-CM

## 2023-01-29 DIAGNOSIS — D89.831 CYTOKINE RELEASE SYNDROME, GRADE 1: ICD-10-CM

## 2023-01-29 LAB
ALBUMIN SERPL-MCNC: 3.5 G/DL (ref 3.5–5.2)
ALBUMIN/GLOB SERPL: 1.4 G/DL
ALP SERPL-CCNC: 73 U/L (ref 39–117)
ALT SERPL W P-5'-P-CCNC: 23 U/L (ref 1–41)
AMMONIA BLD-SCNC: 16 UMOL/L (ref 16–60)
AMPHET+METHAMPHET UR QL: NEGATIVE
AMPHETAMINES UR QL: NEGATIVE
ANION GAP SERPL CALCULATED.3IONS-SCNC: 12 MMOL/L (ref 5–15)
ANION GAP SERPL CALCULATED.3IONS-SCNC: 8 MMOL/L (ref 5–15)
APTT PPP: 40.8 SECONDS (ref 24.1–35)
ARTERIAL PATENCY WRIST A: POSITIVE
ARTERIAL PATENCY WRIST A: POSITIVE
AST SERPL-CCNC: 22 U/L (ref 1–40)
ATMOSPHERIC PRESS: 753 MMHG
ATMOSPHERIC PRESS: 753 MMHG
BACTERIA UR QL AUTO: ABNORMAL /HPF
BARBITURATES UR QL SCN: NEGATIVE
BASE EXCESS BLDA CALC-SCNC: 1.9 MMOL/L (ref 0–2)
BASE EXCESS BLDA CALC-SCNC: 3.9 MMOL/L (ref 0–2)
BASOPHILS # BLD AUTO: 0.07 10*3/MM3 (ref 0–0.2)
BASOPHILS NFR BLD AUTO: 0.2 % (ref 0–1.5)
BDY SITE: ABNORMAL
BDY SITE: ABNORMAL
BENZODIAZ UR QL SCN: POSITIVE
BILIRUB SERPL-MCNC: 0.4 MG/DL (ref 0–1.2)
BILIRUB UR QL STRIP: NEGATIVE
BODY TEMPERATURE: 37 C
BODY TEMPERATURE: 37 C
BUN SERPL-MCNC: 43 MG/DL (ref 8–23)
BUN SERPL-MCNC: 50 MG/DL (ref 8–23)
BUN/CREAT SERPL: 12.3 (ref 7–25)
BUN/CREAT SERPL: 14.7 (ref 7–25)
BUPRENORPHINE SERPL-MCNC: NEGATIVE NG/ML
CALCIUM SPEC-SCNC: 8 MG/DL (ref 8.6–10.5)
CALCIUM SPEC-SCNC: 8.4 MG/DL (ref 8.6–10.5)
CANNABINOIDS SERPL QL: NEGATIVE
CHLORIDE SERPL-SCNC: 91 MMOL/L (ref 98–107)
CHLORIDE SERPL-SCNC: 96 MMOL/L (ref 98–107)
CLARITY UR: ABNORMAL
CO2 SERPL-SCNC: 25 MMOL/L (ref 22–29)
CO2 SERPL-SCNC: 30 MMOL/L (ref 22–29)
COCAINE UR QL: NEGATIVE
COD CRY URNS QL: ABNORMAL /HPF
COLOR UR: YELLOW
CREAT SERPL-MCNC: 2.93 MG/DL (ref 0.76–1.27)
CREAT SERPL-MCNC: 4.08 MG/DL (ref 0.76–1.27)
CRP SERPL-MCNC: 17.73 MG/DL (ref 0–0.5)
D DIMER PPP FEU-MCNC: 0.37 MCGFEU/ML (ref 0–0.65)
D-LACTATE SERPL-SCNC: 1.1 MMOL/L (ref 0.5–2)
DEPRECATED RDW RBC AUTO: 57.2 FL (ref 37–54)
EGFRCR SERPLBLD CKD-EPI 2021: 15.5 ML/MIN/1.73
EGFRCR SERPLBLD CKD-EPI 2021: 23 ML/MIN/1.73
EOSINOPHIL # BLD AUTO: 0.06 10*3/MM3 (ref 0–0.4)
EOSINOPHIL NFR BLD AUTO: 0.2 % (ref 0.3–6.2)
ERYTHROCYTE [DISTWIDTH] IN BLOOD BY AUTOMATED COUNT: 16.7 % (ref 12.3–15.4)
FLUAV RNA RESP QL NAA+PROBE: NOT DETECTED
FLUBV RNA RESP QL NAA+PROBE: NOT DETECTED
GAS FLOW AIRWAY: 3 LPM
GLOBULIN UR ELPH-MCNC: 2.5 GM/DL
GLUCOSE BLDC GLUCOMTR-MCNC: 164 MG/DL (ref 70–130)
GLUCOSE SERPL-MCNC: 132 MG/DL (ref 65–99)
GLUCOSE SERPL-MCNC: 189 MG/DL (ref 65–99)
GLUCOSE UR STRIP-MCNC: NEGATIVE MG/DL
HCO3 BLDA-SCNC: 28.4 MMOL/L (ref 20–26)
HCO3 BLDA-SCNC: 29.3 MMOL/L (ref 20–26)
HCT VFR BLD AUTO: 36.8 % (ref 37.5–51)
HGB BLD-MCNC: 11.1 G/DL (ref 13–17.7)
HGB UR QL STRIP.AUTO: ABNORMAL
HYALINE CASTS UR QL AUTO: ABNORMAL /LPF
IMM GRANULOCYTES # BLD AUTO: 0.49 10*3/MM3 (ref 0–0.05)
IMM GRANULOCYTES NFR BLD AUTO: 1.6 % (ref 0–0.5)
INHALED O2 CONCENTRATION: 50 %
INR PPP: 2.14 (ref 0.91–1.09)
KETONES UR QL STRIP: ABNORMAL
LEUKOCYTE ESTERASE UR QL STRIP.AUTO: ABNORMAL
LYMPHOCYTES # BLD AUTO: 1.25 10*3/MM3 (ref 0.7–3.1)
LYMPHOCYTES NFR BLD AUTO: 4 % (ref 19.6–45.3)
Lab: ABNORMAL
Lab: ABNORMAL
MAGNESIUM SERPL-MCNC: 2.4 MG/DL (ref 1.6–2.4)
MCH RBC QN AUTO: 28 PG (ref 26.6–33)
MCHC RBC AUTO-ENTMCNC: 30.2 G/DL (ref 31.5–35.7)
MCV RBC AUTO: 92.7 FL (ref 79–97)
METHADONE UR QL SCN: NEGATIVE
MODALITY: ABNORMAL
MODALITY: ABNORMAL
MONOCYTES # BLD AUTO: 1.52 10*3/MM3 (ref 0.1–0.9)
MONOCYTES NFR BLD AUTO: 4.9 % (ref 5–12)
MRSA DNA SPEC QL NAA+PROBE: NORMAL
NEUTROPHILS NFR BLD AUTO: 27.8 10*3/MM3 (ref 1.7–7)
NEUTROPHILS NFR BLD AUTO: 89.1 % (ref 42.7–76)
NITRITE UR QL STRIP: NEGATIVE
NRBC BLD AUTO-RTO: 0 /100 WBC (ref 0–0.2)
NT-PROBNP SERPL-MCNC: 451.9 PG/ML (ref 0–900)
OPIATES UR QL: POSITIVE
OXYCODONE UR QL SCN: NEGATIVE
PCO2 BLDA: 41.4 MM HG (ref 35–45)
PCO2 BLDA: 59.4 MM HG (ref 35–45)
PCO2 TEMP ADJ BLD: 41.4 MM HG (ref 35–45)
PCO2 TEMP ADJ BLD: 59.4 MM HG (ref 35–45)
PCP UR QL SCN: NEGATIVE
PEEP RESPIRATORY: 5 CM[H2O]
PH BLDA: 7.3 PH UNITS (ref 7.35–7.45)
PH BLDA: 7.45 PH UNITS (ref 7.35–7.45)
PH UR STRIP.AUTO: <=5 [PH] (ref 5–8)
PH, TEMP CORRECTED: 7.3 PH UNITS (ref 7.35–7.45)
PH, TEMP CORRECTED: 7.45 PH UNITS (ref 7.35–7.45)
PLATELET # BLD AUTO: 184 10*3/MM3 (ref 140–450)
PMV BLD AUTO: 10.9 FL (ref 6–12)
PO2 BLDA: 156 MM HG (ref 83–108)
PO2 BLDA: 70.2 MM HG (ref 83–108)
PO2 TEMP ADJ BLD: 156 MM HG (ref 83–108)
PO2 TEMP ADJ BLD: 70.2 MM HG (ref 83–108)
POTASSIUM SERPL-SCNC: 5.1 MMOL/L (ref 3.5–5.2)
POTASSIUM SERPL-SCNC: 6.2 MMOL/L (ref 3.5–5.2)
PROCALCITONIN SERPL-MCNC: 2.54 NG/ML (ref 0–0.25)
PROPOXYPH UR QL: NEGATIVE
PROT SERPL-MCNC: 6 G/DL (ref 6–8.5)
PROT UR QL STRIP: ABNORMAL
PROTHROMBIN TIME: 24.2 SECONDS (ref 11.8–14.8)
RBC # BLD AUTO: 3.97 10*6/MM3 (ref 4.14–5.8)
RBC # UR STRIP: ABNORMAL /HPF
REF LAB TEST METHOD: ABNORMAL
SAO2 % BLDCOA: 93.6 % (ref 94–99)
SAO2 % BLDCOA: >100.1 % (ref 94–99)
SARS-COV-2 RNA RESP QL NAA+PROBE: DETECTED
SET MECH RESP RATE: 24
SODIUM SERPL-SCNC: 129 MMOL/L (ref 136–145)
SODIUM SERPL-SCNC: 133 MMOL/L (ref 136–145)
SP GR UR STRIP: 1.02 (ref 1–1.03)
SQUAMOUS #/AREA URNS HPF: ABNORMAL /HPF
T3FREE SERPL-MCNC: 2.53 PG/ML (ref 2–4.4)
T4 FREE SERPL-MCNC: 1.12 NG/DL (ref 0.93–1.7)
TRICYCLICS UR QL SCN: NEGATIVE
TROPONIN T SERPL-MCNC: 0.06 NG/ML (ref 0–0.03)
TSH SERPL DL<=0.05 MIU/L-ACNC: 1.16 UIU/ML (ref 0.27–4.2)
UROBILINOGEN UR QL STRIP: ABNORMAL
VENTILATOR MODE: ABNORMAL
VENTILATOR MODE: AC
VT ON VENT VENT: 500 ML
WBC # UR STRIP: ABNORMAL /HPF
WBC NRBC COR # BLD: 31.19 10*3/MM3 (ref 3.4–10.8)

## 2023-01-29 PROCEDURE — 86140 C-REACTIVE PROTEIN: CPT | Performed by: INTERNAL MEDICINE

## 2023-01-29 PROCEDURE — 84439 ASSAY OF FREE THYROXINE: CPT | Performed by: NURSE PRACTITIONER

## 2023-01-29 PROCEDURE — 93010 ELECTROCARDIOGRAM REPORT: CPT | Performed by: INTERNAL MEDICINE

## 2023-01-29 PROCEDURE — 99291 CRITICAL CARE FIRST HOUR: CPT

## 2023-01-29 PROCEDURE — 51702 INSERT TEMP BLADDER CATH: CPT

## 2023-01-29 PROCEDURE — 94640 AIRWAY INHALATION TREATMENT: CPT

## 2023-01-29 PROCEDURE — 25010000002 VANCOMYCIN 10 G RECONSTITUTED SOLUTION: Performed by: FAMILY MEDICINE

## 2023-01-29 PROCEDURE — 85730 THROMBOPLASTIN TIME PARTIAL: CPT | Performed by: NURSE PRACTITIONER

## 2023-01-29 PROCEDURE — 87040 BLOOD CULTURE FOR BACTERIA: CPT | Performed by: NURSE PRACTITIONER

## 2023-01-29 PROCEDURE — 3E0333Z INTRODUCTION OF ANTI-INFLAMMATORY INTO PERIPHERAL VEIN, PERCUTANEOUS APPROACH: ICD-10-PCS | Performed by: FAMILY MEDICINE

## 2023-01-29 PROCEDURE — 0BH17EZ INSERTION OF ENDOTRACHEAL AIRWAY INTO TRACHEA, VIA NATURAL OR ARTIFICIAL OPENING: ICD-10-PCS | Performed by: FAMILY MEDICINE

## 2023-01-29 PROCEDURE — 87641 MR-STAPH DNA AMP PROBE: CPT | Performed by: FAMILY MEDICINE

## 2023-01-29 PROCEDURE — 82962 GLUCOSE BLOOD TEST: CPT

## 2023-01-29 PROCEDURE — 94002 VENT MGMT INPAT INIT DAY: CPT

## 2023-01-29 PROCEDURE — 87636 SARSCOV2 & INF A&B AMP PRB: CPT | Performed by: NURSE PRACTITIONER

## 2023-01-29 PROCEDURE — 94799 UNLISTED PULMONARY SVC/PX: CPT

## 2023-01-29 PROCEDURE — 80306 DRUG TEST PRSMV INSTRMNT: CPT | Performed by: NURSE PRACTITIONER

## 2023-01-29 PROCEDURE — 25010000002 CEFEPIME PER 500 MG: Performed by: FAMILY MEDICINE

## 2023-01-29 PROCEDURE — 93005 ELECTROCARDIOGRAM TRACING: CPT | Performed by: EMERGENCY MEDICINE

## 2023-01-29 PROCEDURE — 82803 BLOOD GASES ANY COMBINATION: CPT

## 2023-01-29 PROCEDURE — 36600 WITHDRAWAL OF ARTERIAL BLOOD: CPT

## 2023-01-29 PROCEDURE — 94761 N-INVAS EAR/PLS OXIMETRY MLT: CPT

## 2023-01-29 PROCEDURE — 94660 CPAP INITIATION&MGMT: CPT

## 2023-01-29 PROCEDURE — 84145 PROCALCITONIN (PCT): CPT | Performed by: NURSE PRACTITIONER

## 2023-01-29 PROCEDURE — 71045 X-RAY EXAM CHEST 1 VIEW: CPT

## 2023-01-29 PROCEDURE — 25010000002 ENOXAPARIN PER 10 MG: Performed by: FAMILY MEDICINE

## 2023-01-29 PROCEDURE — 25010000002 PROPOFOL 10 MG/ML EMULSION: Performed by: EMERGENCY MEDICINE

## 2023-01-29 PROCEDURE — 81001 URINALYSIS AUTO W/SCOPE: CPT | Performed by: NURSE PRACTITIONER

## 2023-01-29 PROCEDURE — 31500 INSERT EMERGENCY AIRWAY: CPT

## 2023-01-29 PROCEDURE — 85025 COMPLETE CBC W/AUTO DIFF WBC: CPT | Performed by: NURSE PRACTITIONER

## 2023-01-29 PROCEDURE — 85379 FIBRIN DEGRADATION QUANT: CPT | Performed by: NURSE PRACTITIONER

## 2023-01-29 PROCEDURE — 25010000002 DEXAMETHASONE PER 1 MG: Performed by: NURSE PRACTITIONER

## 2023-01-29 PROCEDURE — 84484 ASSAY OF TROPONIN QUANT: CPT | Performed by: NURSE PRACTITIONER

## 2023-01-29 PROCEDURE — 83605 ASSAY OF LACTIC ACID: CPT | Performed by: NURSE PRACTITIONER

## 2023-01-29 PROCEDURE — 83880 ASSAY OF NATRIURETIC PEPTIDE: CPT | Performed by: NURSE PRACTITIONER

## 2023-01-29 PROCEDURE — 63710000001 INSULIN REGULAR HUMAN PER 5 UNITS: Performed by: NURSE PRACTITIONER

## 2023-01-29 PROCEDURE — 25010000002 FENTANYL CITRATE (PF) 2500 MCG/50ML SOLUTION: Performed by: EMERGENCY MEDICINE

## 2023-01-29 PROCEDURE — 5A1935Z RESPIRATORY VENTILATION, LESS THAN 24 CONSECUTIVE HOURS: ICD-10-PCS | Performed by: FAMILY MEDICINE

## 2023-01-29 PROCEDURE — 85610 PROTHROMBIN TIME: CPT | Performed by: NURSE PRACTITIONER

## 2023-01-29 PROCEDURE — 84481 FREE ASSAY (FT-3): CPT | Performed by: NURSE PRACTITIONER

## 2023-01-29 PROCEDURE — 84443 ASSAY THYROID STIM HORMONE: CPT | Performed by: NURSE PRACTITIONER

## 2023-01-29 PROCEDURE — 82803 BLOOD GASES ANY COMBINATION: CPT | Performed by: FAMILY MEDICINE

## 2023-01-29 PROCEDURE — 83735 ASSAY OF MAGNESIUM: CPT | Performed by: NURSE PRACTITIONER

## 2023-01-29 PROCEDURE — 36415 COLL VENOUS BLD VENIPUNCTURE: CPT

## 2023-01-29 PROCEDURE — 25010000002 CALCIUM GLUCONATE PER 10 ML: Performed by: FAMILY MEDICINE

## 2023-01-29 PROCEDURE — C1751 CATH, INF, PER/CENT/MIDLINE: HCPCS

## 2023-01-29 PROCEDURE — 82140 ASSAY OF AMMONIA: CPT | Performed by: NURSE PRACTITIONER

## 2023-01-29 PROCEDURE — 80053 COMPREHEN METABOLIC PANEL: CPT | Performed by: INTERNAL MEDICINE

## 2023-01-29 RX ORDER — DEXTROSE MONOHYDRATE 25 G/50ML
50 INJECTION, SOLUTION INTRAVENOUS ONCE
Status: COMPLETED | OUTPATIENT
Start: 2023-01-29 | End: 2023-01-29

## 2023-01-29 RX ORDER — NOREPINEPHRINE BIT/0.9 % NACL 8 MG/250ML
.02-.3 INFUSION BOTTLE (ML) INTRAVENOUS
Status: DISCONTINUED | OUTPATIENT
Start: 2023-01-29 | End: 2023-01-30

## 2023-01-29 RX ORDER — SPIRONOLACTONE 25 MG/1
25 TABLET ORAL DAILY
COMMUNITY
End: 2023-02-02 | Stop reason: HOSPADM

## 2023-01-29 RX ORDER — ETOMIDATE 2 MG/ML
30 INJECTION INTRAVENOUS ONCE
Status: COMPLETED | OUTPATIENT
Start: 2023-01-29 | End: 2023-01-29

## 2023-01-29 RX ORDER — ZINC SULFATE 50(220)MG
220 CAPSULE ORAL DAILY
Status: DISCONTINUED | OUTPATIENT
Start: 2023-01-29 | End: 2023-02-02 | Stop reason: HOSPADM

## 2023-01-29 RX ORDER — METHYLPREDNISOLONE SODIUM SUCCINATE 125 MG/2ML
125 INJECTION, POWDER, LYOPHILIZED, FOR SOLUTION INTRAMUSCULAR; INTRAVENOUS ONCE
Status: DISCONTINUED | OUTPATIENT
Start: 2023-01-29 | End: 2023-01-29

## 2023-01-29 RX ORDER — SODIUM CHLORIDE, SODIUM LACTATE, POTASSIUM CHLORIDE, CALCIUM CHLORIDE 600; 310; 30; 20 MG/100ML; MG/100ML; MG/100ML; MG/100ML
30 INJECTION, SOLUTION INTRAVENOUS CONTINUOUS
Status: DISCONTINUED | OUTPATIENT
Start: 2023-01-29 | End: 2023-02-01

## 2023-01-29 RX ORDER — ASCORBIC ACID 500 MG
500 TABLET ORAL DAILY
Status: DISCONTINUED | OUTPATIENT
Start: 2023-01-29 | End: 2023-02-02 | Stop reason: HOSPADM

## 2023-01-29 RX ORDER — PHENYLEPHRINE HCL IN 0.9% NACL 1 MG/10 ML
100 SYRINGE (ML) INTRAVENOUS ONCE
Status: COMPLETED | OUTPATIENT
Start: 2023-01-29 | End: 2023-01-29

## 2023-01-29 RX ORDER — DEXAMETHASONE SODIUM PHOSPHATE 10 MG/ML
6 INJECTION INTRAMUSCULAR; INTRAVENOUS ONCE
Status: DISCONTINUED | OUTPATIENT
Start: 2023-01-29 | End: 2023-01-29

## 2023-01-29 RX ORDER — IPRATROPIUM BROMIDE AND ALBUTEROL SULFATE 2.5; .5 MG/3ML; MG/3ML
3 SOLUTION RESPIRATORY (INHALATION)
Status: DISCONTINUED | OUTPATIENT
Start: 2023-01-29 | End: 2023-01-30

## 2023-01-29 RX ORDER — ROCURONIUM BROMIDE 10 MG/ML
100 INJECTION, SOLUTION INTRAVENOUS ONCE
Status: COMPLETED | OUTPATIENT
Start: 2023-01-29 | End: 2023-01-29

## 2023-01-29 RX ORDER — DEXAMETHASONE SODIUM PHOSPHATE 4 MG/ML
6 INJECTION, SOLUTION INTRA-ARTICULAR; INTRALESIONAL; INTRAMUSCULAR; INTRAVENOUS; SOFT TISSUE DAILY
Status: DISCONTINUED | OUTPATIENT
Start: 2023-01-30 | End: 2023-01-31

## 2023-01-29 RX ORDER — FUROSEMIDE 40 MG/1
40 TABLET ORAL DAILY
COMMUNITY

## 2023-01-29 RX ORDER — FLUCONAZOLE 200 MG/1
200 TABLET ORAL DAILY
Status: ON HOLD | COMMUNITY
End: 2023-01-30

## 2023-01-29 RX ORDER — DEXAMETHASONE SODIUM PHOSPHATE 10 MG/ML
6 INJECTION INTRAMUSCULAR; INTRAVENOUS ONCE
Status: COMPLETED | OUTPATIENT
Start: 2023-01-29 | End: 2023-01-29

## 2023-01-29 RX ORDER — SODIUM CHLORIDE 0.9 % (FLUSH) 0.9 %
10 SYRINGE (ML) INJECTION AS NEEDED
Status: DISCONTINUED | OUTPATIENT
Start: 2023-01-29 | End: 2023-02-02 | Stop reason: HOSPADM

## 2023-01-29 RX ORDER — ENOXAPARIN SODIUM 100 MG/ML
30 INJECTION SUBCUTANEOUS EVERY 24 HOURS
Status: DISCONTINUED | OUTPATIENT
Start: 2023-01-29 | End: 2023-01-30

## 2023-01-29 RX ORDER — CARVEDILOL 6.25 MG/1
6.25 TABLET ORAL 2 TIMES DAILY WITH MEALS
COMMUNITY

## 2023-01-29 RX ORDER — PREDNISONE 10 MG/1
10 TABLET ORAL DAILY
COMMUNITY
End: 2023-02-02 | Stop reason: HOSPADM

## 2023-01-29 RX ORDER — DEXAMETHASONE 4 MG/1
6 TABLET ORAL
Status: DISCONTINUED | OUTPATIENT
Start: 2023-01-29 | End: 2023-01-29

## 2023-01-29 RX ORDER — IPRATROPIUM BROMIDE AND ALBUTEROL SULFATE 2.5; .5 MG/3ML; MG/3ML
3 SOLUTION RESPIRATORY (INHALATION) ONCE
Status: COMPLETED | OUTPATIENT
Start: 2023-01-29 | End: 2023-01-29

## 2023-01-29 RX ORDER — ZOLPIDEM TARTRATE 10 MG/1
10 TABLET ORAL NIGHTLY PRN
COMMUNITY

## 2023-01-29 RX ORDER — TAMSULOSIN HYDROCHLORIDE 0.4 MG/1
1 CAPSULE ORAL DAILY
COMMUNITY

## 2023-01-29 RX ADMIN — CALCIUM GLUCONATE 1 G: 98 INJECTION, SOLUTION INTRAVENOUS at 14:49

## 2023-01-29 RX ADMIN — Medication 0.02 MCG/KG/MIN: at 11:31

## 2023-01-29 RX ADMIN — IPRATROPIUM BROMIDE AND ALBUTEROL SULFATE 3 ML: 2.5; .5 SOLUTION RESPIRATORY (INHALATION) at 19:49

## 2023-01-29 RX ADMIN — SODIUM CHLORIDE, POTASSIUM CHLORIDE, SODIUM LACTATE AND CALCIUM CHLORIDE 2000 ML: 600; 310; 30; 20 INJECTION, SOLUTION INTRAVENOUS at 15:00

## 2023-01-29 RX ADMIN — IPRATROPIUM BROMIDE AND ALBUTEROL SULFATE 3 ML: 2.5; .5 SOLUTION RESPIRATORY (INHALATION) at 10:00

## 2023-01-29 RX ADMIN — VANCOMYCIN HYDROCHLORIDE 1500 MG: 10 INJECTION, POWDER, LYOPHILIZED, FOR SOLUTION INTRAVENOUS at 15:20

## 2023-01-29 RX ADMIN — SODIUM CHLORIDE, POTASSIUM CHLORIDE, SODIUM LACTATE AND CALCIUM CHLORIDE 100 ML/HR: 600; 310; 30; 20 INJECTION, SOLUTION INTRAVENOUS at 16:49

## 2023-01-29 RX ADMIN — PROPOFOL 40 MCG/KG/MIN: 10 INJECTION, EMULSION INTRAVENOUS at 16:49

## 2023-01-29 RX ADMIN — INSULIN HUMAN 5 UNITS: 100 INJECTION, SOLUTION PARENTERAL at 12:47

## 2023-01-29 RX ADMIN — CEFEPIME 2 G: 2 INJECTION, POWDER, FOR SOLUTION INTRAVENOUS at 15:34

## 2023-01-29 RX ADMIN — PROPOFOL 40 MCG/KG/MIN: 10 INJECTION, EMULSION INTRAVENOUS at 20:35

## 2023-01-29 RX ADMIN — Medication 100 MCG: at 11:32

## 2023-01-29 RX ADMIN — ZINC SULFATE 220 MG (50 MG) CAPSULE 220 MG: CAPSULE at 15:42

## 2023-01-29 RX ADMIN — ROCURONIUM BROMIDE 100 MG: 10 INJECTION, SOLUTION INTRAVENOUS at 11:27

## 2023-01-29 RX ADMIN — DEXTROSE MONOHYDRATE 50 ML: 500 INJECTION PARENTERAL at 12:44

## 2023-01-29 RX ADMIN — Medication 100 MCG: at 11:24

## 2023-01-29 RX ADMIN — FENTANYL CITRATE 50 MCG/HR: 50 INJECTION INTRAVENOUS at 12:49

## 2023-01-29 RX ADMIN — OXYCODONE HYDROCHLORIDE AND ACETAMINOPHEN 500 MG: 500 TABLET ORAL at 15:42

## 2023-01-29 RX ADMIN — PROPOFOL 5 MCG/KG/MIN: 10 INJECTION, EMULSION INTRAVENOUS at 11:50

## 2023-01-29 RX ADMIN — ENOXAPARIN SODIUM 30 MG: 100 INJECTION SUBCUTANEOUS at 14:53

## 2023-01-29 RX ADMIN — ETOMIDATE 30 MG: 20 INJECTION, SOLUTION INTRAVENOUS at 11:26

## 2023-01-29 RX ADMIN — SODIUM ZIRCONIUM CYCLOSILICATE 10 G: 10 POWDER, FOR SUSPENSION ORAL at 20:40

## 2023-01-29 RX ADMIN — DEXAMETHASONE SODIUM PHOSPHATE 6 MG: 10 INJECTION INTRAMUSCULAR; INTRAVENOUS at 12:42

## 2023-01-29 RX ADMIN — SODIUM ZIRCONIUM CYCLOSILICATE 10 G: 10 POWDER, FOR SUSPENSION ORAL at 15:34

## 2023-01-29 RX ADMIN — CEFEPIME 2 G: 2 INJECTION, POWDER, FOR SOLUTION INTRAVENOUS at 22:15

## 2023-01-30 ENCOUNTER — APPOINTMENT (OUTPATIENT)
Dept: ULTRASOUND IMAGING | Facility: HOSPITAL | Age: 68
DRG: 871 | End: 2023-01-30
Payer: OTHER GOVERNMENT

## 2023-01-30 LAB
ANION GAP SERPL CALCULATED.3IONS-SCNC: 8 MMOL/L (ref 5–15)
ARTERIAL PATENCY WRIST A: POSITIVE
ATMOSPHERIC PRESS: 755 MMHG
BASE EXCESS BLDA CALC-SCNC: 4.1 MMOL/L (ref 0–2)
BDY SITE: ABNORMAL
BODY TEMPERATURE: 37 C
BUN SERPL-MCNC: 41 MG/DL (ref 8–23)
BUN/CREAT SERPL: 16.7 (ref 7–25)
CALCIUM SPEC-SCNC: 8.3 MG/DL (ref 8.6–10.5)
CHLORIDE SERPL-SCNC: 100 MMOL/L (ref 98–107)
CO2 SERPL-SCNC: 25 MMOL/L (ref 22–29)
CREAT SERPL-MCNC: 2.46 MG/DL (ref 0.76–1.27)
DEPRECATED RDW RBC AUTO: 53.7 FL (ref 37–54)
EGFRCR SERPLBLD CKD-EPI 2021: 28.4 ML/MIN/1.73
ERYTHROCYTE [DISTWIDTH] IN BLOOD BY AUTOMATED COUNT: 16.7 % (ref 12.3–15.4)
GLUCOSE SERPL-MCNC: 175 MG/DL (ref 65–99)
HCO3 BLDA-SCNC: 27.2 MMOL/L (ref 20–26)
HCT VFR BLD AUTO: 31.3 % (ref 37.5–51)
HGB BLD-MCNC: 9.9 G/DL (ref 13–17.7)
INHALED O2 CONCENTRATION: 40 %
Lab: ABNORMAL
MCH RBC QN AUTO: 27.9 PG (ref 26.6–33)
MCHC RBC AUTO-ENTMCNC: 31.6 G/DL (ref 31.5–35.7)
MCV RBC AUTO: 88.2 FL (ref 79–97)
MODALITY: ABNORMAL
PCO2 BLDA: 34 MM HG (ref 35–45)
PCO2 TEMP ADJ BLD: 34 MM HG (ref 35–45)
PEEP RESPIRATORY: 8 CM[H2O]
PH BLDA: 7.51 PH UNITS (ref 7.35–7.45)
PH, TEMP CORRECTED: 7.51 PH UNITS (ref 7.35–7.45)
PLATELET # BLD AUTO: 155 10*3/MM3 (ref 140–450)
PMV BLD AUTO: 10.6 FL (ref 6–12)
PO2 BLDA: 78.6 MM HG (ref 83–108)
PO2 TEMP ADJ BLD: 78.6 MM HG (ref 83–108)
POTASSIUM SERPL-SCNC: 5.2 MMOL/L (ref 3.5–5.2)
RBC # BLD AUTO: 3.55 10*6/MM3 (ref 4.14–5.8)
SAO2 % BLDCOA: 97.1 % (ref 94–99)
SET MECH RESP RATE: 24
SODIUM SERPL-SCNC: 133 MMOL/L (ref 136–145)
VENTILATOR MODE: AC
VT ON VENT VENT: 500 ML
WBC NRBC COR # BLD: 20.83 10*3/MM3 (ref 3.4–10.8)

## 2023-01-30 PROCEDURE — 94799 UNLISTED PULMONARY SVC/PX: CPT

## 2023-01-30 PROCEDURE — 94003 VENT MGMT INPAT SUBQ DAY: CPT

## 2023-01-30 PROCEDURE — 36600 WITHDRAWAL OF ARTERIAL BLOOD: CPT

## 2023-01-30 PROCEDURE — 25010000002 PROPOFOL 10 MG/ML EMULSION: Performed by: EMERGENCY MEDICINE

## 2023-01-30 PROCEDURE — 25010000002 DEXAMETHASONE PER 1 MG: Performed by: INTERNAL MEDICINE

## 2023-01-30 PROCEDURE — 82803 BLOOD GASES ANY COMBINATION: CPT

## 2023-01-30 PROCEDURE — 85027 COMPLETE CBC AUTOMATED: CPT | Performed by: FAMILY MEDICINE

## 2023-01-30 PROCEDURE — 93971 EXTREMITY STUDY: CPT

## 2023-01-30 PROCEDURE — 25010000002 CEFEPIME PER 500 MG: Performed by: FAMILY MEDICINE

## 2023-01-30 PROCEDURE — 94761 N-INVAS EAR/PLS OXIMETRY MLT: CPT

## 2023-01-30 PROCEDURE — 80048 BASIC METABOLIC PNL TOTAL CA: CPT | Performed by: FAMILY MEDICINE

## 2023-01-30 PROCEDURE — 99223 1ST HOSP IP/OBS HIGH 75: CPT | Performed by: INTERNAL MEDICINE

## 2023-01-30 PROCEDURE — 93971 EXTREMITY STUDY: CPT | Performed by: SURGERY

## 2023-01-30 RX ORDER — LISINOPRIL 10 MG/1
10 TABLET ORAL DAILY
Status: DISCONTINUED | OUTPATIENT
Start: 2023-01-30 | End: 2023-02-02 | Stop reason: HOSPADM

## 2023-01-30 RX ORDER — FUROSEMIDE 40 MG/1
40 TABLET ORAL DAILY
Status: DISCONTINUED | OUTPATIENT
Start: 2023-01-30 | End: 2023-02-02 | Stop reason: HOSPADM

## 2023-01-30 RX ORDER — SODIUM CHLORIDE FOR INHALATION 3 %
4 VIAL, NEBULIZER (ML) INHALATION
Status: DISCONTINUED | OUTPATIENT
Start: 2023-01-30 | End: 2023-01-31

## 2023-01-30 RX ORDER — TAMSULOSIN HYDROCHLORIDE 0.4 MG/1
0.4 CAPSULE ORAL DAILY
Status: DISCONTINUED | OUTPATIENT
Start: 2023-01-30 | End: 2023-02-02 | Stop reason: HOSPADM

## 2023-01-30 RX ORDER — ZOLPIDEM TARTRATE 5 MG/1
10 TABLET ORAL NIGHTLY PRN
Status: DISCONTINUED | OUTPATIENT
Start: 2023-01-30 | End: 2023-02-02 | Stop reason: HOSPADM

## 2023-01-30 RX ORDER — GABAPENTIN 300 MG/1
300 CAPSULE ORAL EVERY 8 HOURS SCHEDULED
Status: DISCONTINUED | OUTPATIENT
Start: 2023-01-30 | End: 2023-02-02 | Stop reason: HOSPADM

## 2023-01-30 RX ORDER — ALBUTEROL SULFATE 90 UG/1
2 AEROSOL, METERED RESPIRATORY (INHALATION) EVERY 4 HOURS PRN
COMMUNITY

## 2023-01-30 RX ORDER — CARVEDILOL 6.25 MG/1
6.25 TABLET ORAL 2 TIMES DAILY WITH MEALS
Status: DISCONTINUED | OUTPATIENT
Start: 2023-01-30 | End: 2023-02-02 | Stop reason: HOSPADM

## 2023-01-30 RX ORDER — GUAIFENESIN 200 MG/10ML
400 LIQUID ORAL 3 TIMES DAILY
Status: DISCONTINUED | OUTPATIENT
Start: 2023-01-30 | End: 2023-02-02 | Stop reason: HOSPADM

## 2023-01-30 RX ORDER — ATORVASTATIN CALCIUM 40 MG/1
80 TABLET, FILM COATED ORAL NIGHTLY
Status: DISCONTINUED | OUTPATIENT
Start: 2023-01-30 | End: 2023-02-02 | Stop reason: HOSPADM

## 2023-01-30 RX ORDER — MAGNESIUM OXIDE 400 MG/1
400 TABLET ORAL 2 TIMES DAILY
Status: ON HOLD | COMMUNITY
End: 2023-02-02 | Stop reason: SDUPTHER

## 2023-01-30 RX ORDER — MELATONIN
2000 DAILY
Status: DISCONTINUED | OUTPATIENT
Start: 2023-01-30 | End: 2023-02-02 | Stop reason: HOSPADM

## 2023-01-30 RX ORDER — ALBUTEROL SULFATE 90 UG/1
2 AEROSOL, METERED RESPIRATORY (INHALATION) EVERY 4 HOURS PRN
Status: DISCONTINUED | OUTPATIENT
Start: 2023-01-30 | End: 2023-01-31

## 2023-01-30 RX ORDER — HYDROCODONE BITARTRATE AND ACETAMINOPHEN 10; 325 MG/1; MG/1
1 TABLET ORAL 2 TIMES DAILY
Status: DISCONTINUED | OUTPATIENT
Start: 2023-01-30 | End: 2023-02-02 | Stop reason: HOSPADM

## 2023-01-30 RX ADMIN — GUAIFENESIN 400 MG: 200 SOLUTION ORAL at 16:46

## 2023-01-30 RX ADMIN — PROPOFOL 40 MCG/KG/MIN: 10 INJECTION, EMULSION INTRAVENOUS at 07:00

## 2023-01-30 RX ADMIN — ZOLPIDEM TARTRATE 10 MG: 5 TABLET ORAL at 21:18

## 2023-01-30 RX ADMIN — GUAIFENESIN 400 MG: 200 SOLUTION ORAL at 11:47

## 2023-01-30 RX ADMIN — PROPOFOL 40 MCG/KG/MIN: 10 INJECTION, EMULSION INTRAVENOUS at 03:03

## 2023-01-30 RX ADMIN — SODIUM CHLORIDE SOLN NEBU 3% 4 ML: 3 NEBU SOLN at 09:34

## 2023-01-30 RX ADMIN — SODIUM ZIRCONIUM CYCLOSILICATE 10 G: 10 POWDER, FOR SUSPENSION ORAL at 08:03

## 2023-01-30 RX ADMIN — GUAIFENESIN 400 MG: 200 SOLUTION ORAL at 20:07

## 2023-01-30 RX ADMIN — SODIUM CHLORIDE, POTASSIUM CHLORIDE, SODIUM LACTATE AND CALCIUM CHLORIDE 100 ML/HR: 600; 310; 30; 20 INJECTION, SOLUTION INTRAVENOUS at 08:04

## 2023-01-30 RX ADMIN — GABAPENTIN 300 MG: 300 CAPSULE ORAL at 11:53

## 2023-01-30 RX ADMIN — PROPOFOL 40 MCG/KG/MIN: 10 INJECTION, EMULSION INTRAVENOUS at 00:15

## 2023-01-30 RX ADMIN — CARVEDILOL 6.25 MG: 6.25 TABLET, FILM COATED ORAL at 18:04

## 2023-01-30 RX ADMIN — LISINOPRIL 10 MG: 10 TABLET ORAL at 11:47

## 2023-01-30 RX ADMIN — RIVAROXABAN 15 MG: 15 TABLET, FILM COATED ORAL at 18:04

## 2023-01-30 RX ADMIN — IPRATROPIUM BROMIDE AND ALBUTEROL SULFATE 3 ML: 2.5; .5 SOLUTION RESPIRATORY (INHALATION) at 06:37

## 2023-01-30 RX ADMIN — SODIUM ZIRCONIUM CYCLOSILICATE 10 G: 10 POWDER, FOR SUSPENSION ORAL at 16:46

## 2023-01-30 RX ADMIN — ZINC SULFATE 220 MG (50 MG) CAPSULE 220 MG: CAPSULE at 08:02

## 2023-01-30 RX ADMIN — DEXAMETHASONE SODIUM PHOSPHATE 6 MG: 4 INJECTION, SOLUTION INTRA-ARTICULAR; INTRALESIONAL; INTRAMUSCULAR; INTRAVENOUS; SOFT TISSUE at 08:14

## 2023-01-30 RX ADMIN — OXYCODONE HYDROCHLORIDE AND ACETAMINOPHEN 500 MG: 500 TABLET ORAL at 08:02

## 2023-01-30 RX ADMIN — IPRATROPIUM BROMIDE AND ALBUTEROL SULFATE 3 ML: 2.5; .5 SOLUTION RESPIRATORY (INHALATION) at 09:41

## 2023-01-30 RX ADMIN — Medication 10 ML: at 08:14

## 2023-01-30 RX ADMIN — ALBUTEROL SULFATE 2 PUFF: 90 AEROSOL, METERED RESPIRATORY (INHALATION) at 14:52

## 2023-01-30 RX ADMIN — ATORVASTATIN CALCIUM 80 MG: 40 TABLET, FILM COATED ORAL at 20:07

## 2023-01-30 RX ADMIN — SODIUM ZIRCONIUM CYCLOSILICATE 10 G: 10 POWDER, FOR SUSPENSION ORAL at 20:07

## 2023-01-30 RX ADMIN — HYDROCODONE BITARTRATE AND ACETAMINOPHEN 1 TABLET: 10; 325 TABLET ORAL at 20:07

## 2023-01-30 RX ADMIN — Medication 2000 UNITS: at 11:47

## 2023-01-30 RX ADMIN — TAMSULOSIN HYDROCHLORIDE 0.4 MG: 0.4 CAPSULE ORAL at 11:47

## 2023-01-30 RX ADMIN — GABAPENTIN 300 MG: 300 CAPSULE ORAL at 20:07

## 2023-01-30 RX ADMIN — FUROSEMIDE 40 MG: 40 TABLET ORAL at 11:47

## 2023-01-30 RX ADMIN — HYDROCODONE BITARTRATE AND ACETAMINOPHEN 1 TABLET: 10; 325 TABLET ORAL at 11:47

## 2023-01-30 RX ADMIN — CEFEPIME 2 G: 2 INJECTION, POWDER, FOR SOLUTION INTRAVENOUS at 13:44

## 2023-01-30 RX ADMIN — ALBUTEROL SULFATE 2 PUFF: 90 AEROSOL, METERED RESPIRATORY (INHALATION) at 21:10

## 2023-01-31 ENCOUNTER — APPOINTMENT (OUTPATIENT)
Dept: CARDIOLOGY | Facility: HOSPITAL | Age: 68
DRG: 871 | End: 2023-01-31
Payer: OTHER GOVERNMENT

## 2023-01-31 LAB
ALBUMIN SERPL-MCNC: 3.3 G/DL (ref 3.5–5.2)
ALBUMIN/GLOB SERPL: 1.2 G/DL
ALP SERPL-CCNC: 68 U/L (ref 39–117)
ALT SERPL W P-5'-P-CCNC: 25 U/L (ref 1–41)
ANION GAP SERPL CALCULATED.3IONS-SCNC: 8 MMOL/L (ref 5–15)
ARTERIAL PATENCY WRIST A: ABNORMAL
AST SERPL-CCNC: 26 U/L (ref 1–40)
ATMOSPHERIC PRESS: 754 MMHG
BASE EXCESS BLDA CALC-SCNC: 2 MMOL/L (ref 0–2)
BASOPHILS # BLD AUTO: 0.02 10*3/MM3 (ref 0–0.2)
BASOPHILS NFR BLD AUTO: 0.1 % (ref 0–1.5)
BDY SITE: ABNORMAL
BH CV ECHO MEAS - AO MAX PG: 6.7 MMHG
BH CV ECHO MEAS - AO MEAN PG: 4 MMHG
BH CV ECHO MEAS - AO ROOT DIAM: 3.2 CM
BH CV ECHO MEAS - AO V2 MAX: 129 CM/SEC
BH CV ECHO MEAS - AO V2 VTI: 25.8 CM
BH CV ECHO MEAS - AVA(I,D): 3.8 CM2
BH CV ECHO MEAS - EDV(CUBED): 110.6 ML
BH CV ECHO MEAS - EDV(MOD-SP4): 142 ML
BH CV ECHO MEAS - EF(MOD-SP4): 68.2 %
BH CV ECHO MEAS - ESV(CUBED): 24.4 ML
BH CV ECHO MEAS - ESV(MOD-SP4): 45.2 ML
BH CV ECHO MEAS - FS: 39.6 %
BH CV ECHO MEAS - IVS/LVPW: 0.9 CM
BH CV ECHO MEAS - IVSD: 0.9 CM
BH CV ECHO MEAS - LA DIMENSION: 3.2 CM
BH CV ECHO MEAS - LAT PEAK E' VEL: 10.7 CM/SEC
BH CV ECHO MEAS - LV DIASTOLIC VOL/BSA (35-75): 66.2 CM2
BH CV ECHO MEAS - LV MASS(C)D: 158.8 GRAMS
BH CV ECHO MEAS - LV MAX PG: 4.1 MMHG
BH CV ECHO MEAS - LV MEAN PG: 2 MMHG
BH CV ECHO MEAS - LV SYSTOLIC VOL/BSA (12-30): 21.1 CM2
BH CV ECHO MEAS - LV V1 MAX: 101 CM/SEC
BH CV ECHO MEAS - LV V1 VTI: 23.6 CM
BH CV ECHO MEAS - LVIDD: 4.8 CM
BH CV ECHO MEAS - LVIDS: 2.9 CM
BH CV ECHO MEAS - LVOT AREA: 4.2 CM2
BH CV ECHO MEAS - LVOT DIAM: 2.3 CM
BH CV ECHO MEAS - LVPWD: 1 CM
BH CV ECHO MEAS - MED PEAK E' VEL: 7.8 CM/SEC
BH CV ECHO MEAS - MV A MAX VEL: 110 CM/SEC
BH CV ECHO MEAS - MV DEC TIME: 0.25 MSEC
BH CV ECHO MEAS - MV E MAX VEL: 108 CM/SEC
BH CV ECHO MEAS - MV E/A: 0.98
BH CV ECHO MEAS - SI(MOD-SP4): 45.1 ML/M2
BH CV ECHO MEAS - SV(LVOT): 98.1 ML
BH CV ECHO MEAS - SV(MOD-SP4): 96.8 ML
BH CV ECHO MEASUREMENTS AVERAGE E/E' RATIO: 11.68
BILIRUB SERPL-MCNC: 0.2 MG/DL (ref 0–1.2)
BODY TEMPERATURE: 37 C
BUN SERPL-MCNC: 42 MG/DL (ref 8–23)
BUN/CREAT SERPL: 25.3 (ref 7–25)
CALCIUM SPEC-SCNC: 9 MG/DL (ref 8.6–10.5)
CHLORIDE SERPL-SCNC: 98 MMOL/L (ref 98–107)
CHOLEST SERPL-MCNC: 125 MG/DL (ref 0–200)
CO2 SERPL-SCNC: 30 MMOL/L (ref 22–29)
CREAT SERPL-MCNC: 1.66 MG/DL (ref 0.76–1.27)
DEPRECATED RDW RBC AUTO: 57.5 FL (ref 37–54)
EGFRCR SERPLBLD CKD-EPI 2021: 45.5 ML/MIN/1.73
EOSINOPHIL # BLD AUTO: 0 10*3/MM3 (ref 0–0.4)
EOSINOPHIL NFR BLD AUTO: 0 % (ref 0.3–6.2)
ERYTHROCYTE [DISTWIDTH] IN BLOOD BY AUTOMATED COUNT: 17.1 % (ref 12.3–15.4)
GLOBULIN UR ELPH-MCNC: 2.8 GM/DL
GLUCOSE SERPL-MCNC: 194 MG/DL (ref 65–99)
HBA1C MFR BLD: 5.9 % (ref 4.8–5.6)
HCO3 BLDA-SCNC: 25.5 MMOL/L (ref 20–26)
HCT VFR BLD AUTO: 32.3 % (ref 37.5–51)
HDLC SERPL-MCNC: 39 MG/DL (ref 40–60)
HGB BLD-MCNC: 9.7 G/DL (ref 13–17.7)
IMM GRANULOCYTES # BLD AUTO: 0.19 10*3/MM3 (ref 0–0.05)
IMM GRANULOCYTES NFR BLD AUTO: 1.2 % (ref 0–0.5)
INHALED O2 CONCENTRATION: 40 %
LDLC SERPL CALC-MCNC: 62 MG/DL (ref 0–100)
LDLC/HDLC SERPL: 1.51 {RATIO}
LEFT ATRIUM VOLUME INDEX: 19.6 ML/M2
LEFT ATRIUM VOLUME: 41.9 ML
LYMPHOCYTES # BLD AUTO: 0.61 10*3/MM3 (ref 0.7–3.1)
LYMPHOCYTES NFR BLD AUTO: 3.7 % (ref 19.6–45.3)
MAXIMAL PREDICTED HEART RATE: 155 BPM
MCH RBC QN AUTO: 27.4 PG (ref 26.6–33)
MCHC RBC AUTO-ENTMCNC: 30 G/DL (ref 31.5–35.7)
MCV RBC AUTO: 91.2 FL (ref 79–97)
MODALITY: ABNORMAL
MONOCYTES # BLD AUTO: 0.62 10*3/MM3 (ref 0.1–0.9)
MONOCYTES NFR BLD AUTO: 3.8 % (ref 5–12)
NEUTROPHILS NFR BLD AUTO: 14.95 10*3/MM3 (ref 1.7–7)
NEUTROPHILS NFR BLD AUTO: 91.2 % (ref 42.7–76)
NRBC BLD AUTO-RTO: 0 /100 WBC (ref 0–0.2)
PCO2 BLDA: 34.5 MM HG (ref 35–45)
PEEP RESPIRATORY: 8 CM[H2O]
PH BLDA: 7.48 PH UNITS (ref 7.35–7.45)
PLATELET # BLD AUTO: 127 10*3/MM3 (ref 140–450)
PMV BLD AUTO: 11.3 FL (ref 6–12)
PO2 BLDA: 95.3 MM HG (ref 83–108)
POTASSIUM SERPL-SCNC: 5.6 MMOL/L (ref 3.5–5.2)
PROT SERPL-MCNC: 6.1 G/DL (ref 6–8.5)
QT INTERVAL: 354 MS
QTC INTERVAL: 405 MS
RBC # BLD AUTO: 3.54 10*6/MM3 (ref 4.14–5.8)
SAO2 % BLDCOA: 98.4 % (ref 94–99)
SET MECH RESP RATE: 24
SODIUM SERPL-SCNC: 136 MMOL/L (ref 136–145)
STRESS TARGET HR: 132 BPM
TRIGL SERPL-MCNC: 136 MG/DL (ref 0–150)
VENTILATOR MODE: AC
VLDLC SERPL-MCNC: 24 MG/DL (ref 5–40)
VT ON VENT VENT: 500 ML
WBC NRBC COR # BLD: 16.39 10*3/MM3 (ref 3.4–10.8)

## 2023-01-31 PROCEDURE — 25010000002 CEFEPIME PER 500 MG: Performed by: FAMILY MEDICINE

## 2023-01-31 PROCEDURE — 25010000002 DEXAMETHASONE PER 1 MG: Performed by: INTERNAL MEDICINE

## 2023-01-31 PROCEDURE — 94664 DEMO&/EVAL PT USE INHALER: CPT

## 2023-01-31 PROCEDURE — 85025 COMPLETE CBC W/AUTO DIFF WBC: CPT | Performed by: FAMILY MEDICINE

## 2023-01-31 PROCEDURE — 83036 HEMOGLOBIN GLYCOSYLATED A1C: CPT | Performed by: FAMILY MEDICINE

## 2023-01-31 PROCEDURE — 94799 UNLISTED PULMONARY SVC/PX: CPT

## 2023-01-31 PROCEDURE — 99232 SBSQ HOSP IP/OBS MODERATE 35: CPT | Performed by: INTERNAL MEDICINE

## 2023-01-31 PROCEDURE — 80061 LIPID PANEL: CPT | Performed by: FAMILY MEDICINE

## 2023-01-31 PROCEDURE — 25010000002 PERFLUTREN 6.52 MG/ML SUSPENSION: Performed by: FAMILY MEDICINE

## 2023-01-31 PROCEDURE — 93306 TTE W/DOPPLER COMPLETE: CPT

## 2023-01-31 PROCEDURE — 80053 COMPREHEN METABOLIC PANEL: CPT | Performed by: FAMILY MEDICINE

## 2023-01-31 PROCEDURE — 99221 1ST HOSP IP/OBS SF/LOW 40: CPT | Performed by: NURSE PRACTITIONER

## 2023-01-31 PROCEDURE — 93306 TTE W/DOPPLER COMPLETE: CPT | Performed by: INTERNAL MEDICINE

## 2023-01-31 RX ORDER — ALBUTEROL SULFATE 90 UG/1
2 AEROSOL, METERED RESPIRATORY (INHALATION)
Status: DISCONTINUED | OUTPATIENT
Start: 2023-01-31 | End: 2023-02-02 | Stop reason: HOSPADM

## 2023-01-31 RX ORDER — BUDESONIDE AND FORMOTEROL FUMARATE DIHYDRATE 160; 4.5 UG/1; UG/1
2 AEROSOL RESPIRATORY (INHALATION)
Status: DISCONTINUED | OUTPATIENT
Start: 2023-01-31 | End: 2023-02-02 | Stop reason: HOSPADM

## 2023-01-31 RX ADMIN — GABAPENTIN 300 MG: 300 CAPSULE ORAL at 04:37

## 2023-01-31 RX ADMIN — RIVAROXABAN 15 MG: 15 TABLET, FILM COATED ORAL at 17:29

## 2023-01-31 RX ADMIN — GUAIFENESIN 400 MG: 200 SOLUTION ORAL at 08:00

## 2023-01-31 RX ADMIN — ALBUTEROL SULFATE 2 PUFF: 90 AEROSOL, METERED RESPIRATORY (INHALATION) at 07:14

## 2023-01-31 RX ADMIN — ALBUTEROL SULFATE 2 PUFF: 90 AEROSOL, METERED RESPIRATORY (INHALATION) at 20:04

## 2023-01-31 RX ADMIN — OXYCODONE HYDROCHLORIDE AND ACETAMINOPHEN 500 MG: 500 TABLET ORAL at 08:00

## 2023-01-31 RX ADMIN — ALBUTEROL SULFATE 2 PUFF: 90 AEROSOL, METERED RESPIRATORY (INHALATION) at 15:39

## 2023-01-31 RX ADMIN — ZOLPIDEM TARTRATE 10 MG: 5 TABLET ORAL at 20:51

## 2023-01-31 RX ADMIN — PERFLUTREN 8.48 MG: 6.52 INJECTION, SUSPENSION INTRAVENOUS at 10:00

## 2023-01-31 RX ADMIN — GABAPENTIN 300 MG: 300 CAPSULE ORAL at 12:28

## 2023-01-31 RX ADMIN — GABAPENTIN 300 MG: 300 CAPSULE ORAL at 20:51

## 2023-01-31 RX ADMIN — SODIUM CHLORIDE SOLN NEBU 3% 4 ML: 3 NEBU SOLN at 07:14

## 2023-01-31 RX ADMIN — SODIUM CHLORIDE, POTASSIUM CHLORIDE, SODIUM LACTATE AND CALCIUM CHLORIDE 30 ML/HR: 600; 310; 30; 20 INJECTION, SOLUTION INTRAVENOUS at 08:00

## 2023-01-31 RX ADMIN — HYDROCODONE BITARTRATE AND ACETAMINOPHEN 1 TABLET: 10; 325 TABLET ORAL at 20:51

## 2023-01-31 RX ADMIN — CEFEPIME 2 G: 2 INJECTION, POWDER, FOR SOLUTION INTRAVENOUS at 14:33

## 2023-01-31 RX ADMIN — HYDROCODONE BITARTRATE AND ACETAMINOPHEN 1 TABLET: 10; 325 TABLET ORAL at 08:00

## 2023-01-31 RX ADMIN — LISINOPRIL 10 MG: 10 TABLET ORAL at 08:00

## 2023-01-31 RX ADMIN — CARVEDILOL 6.25 MG: 6.25 TABLET, FILM COATED ORAL at 07:51

## 2023-01-31 RX ADMIN — ATORVASTATIN CALCIUM 80 MG: 40 TABLET, FILM COATED ORAL at 20:51

## 2023-01-31 RX ADMIN — DEXAMETHASONE SODIUM PHOSPHATE 6 MG: 4 INJECTION, SOLUTION INTRA-ARTICULAR; INTRALESIONAL; INTRAMUSCULAR; INTRAVENOUS; SOFT TISSUE at 08:00

## 2023-01-31 RX ADMIN — CARVEDILOL 6.25 MG: 6.25 TABLET, FILM COATED ORAL at 17:29

## 2023-01-31 RX ADMIN — GUAIFENESIN 400 MG: 200 SOLUTION ORAL at 20:51

## 2023-01-31 RX ADMIN — BUDESONIDE AND FORMOTEROL FUMARATE DIHYDRATE 2 PUFF: 160; 4.5 AEROSOL RESPIRATORY (INHALATION) at 20:04

## 2023-01-31 RX ADMIN — GUAIFENESIN 400 MG: 200 SOLUTION ORAL at 16:45

## 2023-01-31 RX ADMIN — FUROSEMIDE 40 MG: 40 TABLET ORAL at 08:00

## 2023-01-31 RX ADMIN — SODIUM CHLORIDE, POTASSIUM CHLORIDE, SODIUM LACTATE AND CALCIUM CHLORIDE 30 ML/HR: 600; 310; 30; 20 INJECTION, SOLUTION INTRAVENOUS at 16:45

## 2023-01-31 RX ADMIN — SODIUM ZIRCONIUM CYCLOSILICATE 10 G: 10 POWDER, FOR SUSPENSION ORAL at 08:00

## 2023-01-31 RX ADMIN — CEFEPIME 2 G: 2 INJECTION, POWDER, FOR SOLUTION INTRAVENOUS at 03:00

## 2023-01-31 RX ADMIN — ZINC SULFATE 220 MG (50 MG) CAPSULE 220 MG: CAPSULE at 08:00

## 2023-01-31 RX ADMIN — ALBUTEROL SULFATE 2 PUFF: 90 AEROSOL, METERED RESPIRATORY (INHALATION) at 11:23

## 2023-01-31 RX ADMIN — BUDESONIDE AND FORMOTEROL FUMARATE DIHYDRATE 2 PUFF: 160; 4.5 AEROSOL RESPIRATORY (INHALATION) at 11:23

## 2023-01-31 RX ADMIN — TAMSULOSIN HYDROCHLORIDE 0.4 MG: 0.4 CAPSULE ORAL at 08:00

## 2023-01-31 RX ADMIN — ALBUTEROL SULFATE 2 PUFF: 90 AEROSOL, METERED RESPIRATORY (INHALATION) at 04:27

## 2023-01-31 RX ADMIN — Medication 2000 UNITS: at 08:00

## 2023-02-01 ENCOUNTER — APPOINTMENT (OUTPATIENT)
Dept: ULTRASOUND IMAGING | Facility: HOSPITAL | Age: 68
DRG: 871 | End: 2023-02-01
Payer: OTHER GOVERNMENT

## 2023-02-01 LAB
ALBUMIN SERPL-MCNC: 3.4 G/DL (ref 3.5–5.2)
ALBUMIN/GLOB SERPL: 1.2 G/DL
ALP SERPL-CCNC: 65 U/L (ref 39–117)
ALT SERPL W P-5'-P-CCNC: 25 U/L (ref 1–41)
ANION GAP SERPL CALCULATED.3IONS-SCNC: 10 MMOL/L (ref 5–15)
AST SERPL-CCNC: 24 U/L (ref 1–40)
BASOPHILS # BLD AUTO: 0.01 10*3/MM3 (ref 0–0.2)
BASOPHILS NFR BLD AUTO: 0.1 % (ref 0–1.5)
BILIRUB SERPL-MCNC: 0.3 MG/DL (ref 0–1.2)
BUN SERPL-MCNC: 40 MG/DL (ref 8–23)
BUN/CREAT SERPL: 40 (ref 7–25)
CALCIUM SPEC-SCNC: 9 MG/DL (ref 8.6–10.5)
CHLORIDE SERPL-SCNC: 97 MMOL/L (ref 98–107)
CO2 SERPL-SCNC: 27 MMOL/L (ref 22–29)
CREAT SERPL-MCNC: 1 MG/DL (ref 0.76–1.27)
DEPRECATED RDW RBC AUTO: 54 FL (ref 37–54)
EGFRCR SERPLBLD CKD-EPI 2021: 83.5 ML/MIN/1.73
EOSINOPHIL # BLD AUTO: 0 10*3/MM3 (ref 0–0.4)
EOSINOPHIL NFR BLD AUTO: 0 % (ref 0.3–6.2)
ERYTHROCYTE [DISTWIDTH] IN BLOOD BY AUTOMATED COUNT: 16.6 % (ref 12.3–15.4)
GLOBULIN UR ELPH-MCNC: 2.9 GM/DL
GLUCOSE SERPL-MCNC: 143 MG/DL (ref 65–99)
HCT VFR BLD AUTO: 33.2 % (ref 37.5–51)
HGB BLD-MCNC: 10.3 G/DL (ref 13–17.7)
IMM GRANULOCYTES # BLD AUTO: 0.18 10*3/MM3 (ref 0–0.05)
IMM GRANULOCYTES NFR BLD AUTO: 1.1 % (ref 0–0.5)
LYMPHOCYTES # BLD AUTO: 0.67 10*3/MM3 (ref 0.7–3.1)
LYMPHOCYTES NFR BLD AUTO: 4.2 % (ref 19.6–45.3)
MCH RBC QN AUTO: 27.4 PG (ref 26.6–33)
MCHC RBC AUTO-ENTMCNC: 31 G/DL (ref 31.5–35.7)
MCV RBC AUTO: 88.3 FL (ref 79–97)
MONOCYTES # BLD AUTO: 0.8 10*3/MM3 (ref 0.1–0.9)
MONOCYTES NFR BLD AUTO: 5 % (ref 5–12)
NEUTROPHILS NFR BLD AUTO: 14.47 10*3/MM3 (ref 1.7–7)
NEUTROPHILS NFR BLD AUTO: 89.6 % (ref 42.7–76)
NRBC BLD AUTO-RTO: 0 /100 WBC (ref 0–0.2)
PLATELET # BLD AUTO: 112 10*3/MM3 (ref 140–450)
PMV BLD AUTO: 11.1 FL (ref 6–12)
POTASSIUM SERPL-SCNC: 5.3 MMOL/L (ref 3.5–5.2)
PROT SERPL-MCNC: 6.3 G/DL (ref 6–8.5)
RBC # BLD AUTO: 3.76 10*6/MM3 (ref 4.14–5.8)
SODIUM SERPL-SCNC: 134 MMOL/L (ref 136–145)
WBC NRBC COR # BLD: 16.13 10*3/MM3 (ref 3.4–10.8)

## 2023-02-01 PROCEDURE — 94799 UNLISTED PULMONARY SVC/PX: CPT

## 2023-02-01 PROCEDURE — 25010000002 CEFEPIME PER 500 MG: Performed by: FAMILY MEDICINE

## 2023-02-01 PROCEDURE — 80053 COMPREHEN METABOLIC PANEL: CPT | Performed by: FAMILY MEDICINE

## 2023-02-01 PROCEDURE — 85025 COMPLETE CBC W/AUTO DIFF WBC: CPT | Performed by: FAMILY MEDICINE

## 2023-02-01 PROCEDURE — 94664 DEMO&/EVAL PT USE INHALER: CPT

## 2023-02-01 PROCEDURE — 97161 PT EVAL LOW COMPLEX 20 MIN: CPT

## 2023-02-01 PROCEDURE — 63710000001 DEXAMETHASONE PER 0.25 MG: Performed by: FAMILY MEDICINE

## 2023-02-01 PROCEDURE — 94760 N-INVAS EAR/PLS OXIMETRY 1: CPT

## 2023-02-01 PROCEDURE — 76775 US EXAM ABDO BACK WALL LIM: CPT

## 2023-02-01 PROCEDURE — 99232 SBSQ HOSP IP/OBS MODERATE 35: CPT | Performed by: INTERNAL MEDICINE

## 2023-02-01 RX ORDER — SODIUM CHLORIDE, SODIUM LACTATE, POTASSIUM CHLORIDE, CALCIUM CHLORIDE 600; 310; 30; 20 MG/100ML; MG/100ML; MG/100ML; MG/100ML
30 INJECTION, SOLUTION INTRAVENOUS CONTINUOUS
Status: DISCONTINUED | OUTPATIENT
Start: 2023-02-01 | End: 2023-02-02 | Stop reason: HOSPADM

## 2023-02-01 RX ADMIN — CARVEDILOL 6.25 MG: 6.25 TABLET, FILM COATED ORAL at 17:46

## 2023-02-01 RX ADMIN — GUAIFENESIN 400 MG: 200 SOLUTION ORAL at 15:30

## 2023-02-01 RX ADMIN — TAMSULOSIN HYDROCHLORIDE 0.4 MG: 0.4 CAPSULE ORAL at 08:42

## 2023-02-01 RX ADMIN — FUROSEMIDE 40 MG: 40 TABLET ORAL at 08:42

## 2023-02-01 RX ADMIN — GABAPENTIN 300 MG: 300 CAPSULE ORAL at 11:16

## 2023-02-01 RX ADMIN — ALBUTEROL SULFATE 2 PUFF: 90 AEROSOL, METERED RESPIRATORY (INHALATION) at 05:40

## 2023-02-01 RX ADMIN — CARVEDILOL 6.25 MG: 6.25 TABLET, FILM COATED ORAL at 08:41

## 2023-02-01 RX ADMIN — SODIUM ZIRCONIUM CYCLOSILICATE 10 G: 10 POWDER, FOR SUSPENSION ORAL at 21:05

## 2023-02-01 RX ADMIN — BUDESONIDE AND FORMOTEROL FUMARATE DIHYDRATE 2 PUFF: 160; 4.5 AEROSOL RESPIRATORY (INHALATION) at 19:52

## 2023-02-01 RX ADMIN — ZOLPIDEM TARTRATE 10 MG: 5 TABLET ORAL at 21:06

## 2023-02-01 RX ADMIN — Medication 2000 UNITS: at 08:42

## 2023-02-01 RX ADMIN — CEFEPIME 2 G: 2 INJECTION, POWDER, FOR SOLUTION INTRAVENOUS at 17:46

## 2023-02-01 RX ADMIN — GUAIFENESIN 400 MG: 200 SOLUTION ORAL at 21:07

## 2023-02-01 RX ADMIN — ZINC SULFATE 220 MG (50 MG) CAPSULE 220 MG: CAPSULE at 08:42

## 2023-02-01 RX ADMIN — CEFEPIME 2 G: 2 INJECTION, POWDER, FOR SOLUTION INTRAVENOUS at 11:16

## 2023-02-01 RX ADMIN — SODIUM ZIRCONIUM CYCLOSILICATE 10 G: 10 POWDER, FOR SUSPENSION ORAL at 15:30

## 2023-02-01 RX ADMIN — HYDROCODONE BITARTRATE AND ACETAMINOPHEN 1 TABLET: 10; 325 TABLET ORAL at 21:07

## 2023-02-01 RX ADMIN — CEFEPIME 2 G: 2 INJECTION, POWDER, FOR SOLUTION INTRAVENOUS at 02:29

## 2023-02-01 RX ADMIN — ALBUTEROL SULFATE 2 PUFF: 90 AEROSOL, METERED RESPIRATORY (INHALATION) at 13:47

## 2023-02-01 RX ADMIN — GABAPENTIN 300 MG: 300 CAPSULE ORAL at 04:22

## 2023-02-01 RX ADMIN — SODIUM ZIRCONIUM CYCLOSILICATE 10 G: 10 POWDER, FOR SUSPENSION ORAL at 08:42

## 2023-02-01 RX ADMIN — GABAPENTIN 300 MG: 300 CAPSULE ORAL at 21:07

## 2023-02-01 RX ADMIN — BUDESONIDE AND FORMOTEROL FUMARATE DIHYDRATE 2 PUFF: 160; 4.5 AEROSOL RESPIRATORY (INHALATION) at 05:40

## 2023-02-01 RX ADMIN — OXYCODONE HYDROCHLORIDE AND ACETAMINOPHEN 500 MG: 500 TABLET ORAL at 08:42

## 2023-02-01 RX ADMIN — ALBUTEROL SULFATE 2 PUFF: 90 AEROSOL, METERED RESPIRATORY (INHALATION) at 09:44

## 2023-02-01 RX ADMIN — ATORVASTATIN CALCIUM 80 MG: 40 TABLET, FILM COATED ORAL at 21:06

## 2023-02-01 RX ADMIN — RIVAROXABAN 20 MG: 20 TABLET, FILM COATED ORAL at 17:46

## 2023-02-01 RX ADMIN — LISINOPRIL 10 MG: 10 TABLET ORAL at 08:42

## 2023-02-01 RX ADMIN — ALBUTEROL SULFATE 2 PUFF: 90 AEROSOL, METERED RESPIRATORY (INHALATION) at 19:52

## 2023-02-01 RX ADMIN — HYDROCODONE BITARTRATE AND ACETAMINOPHEN 1 TABLET: 10; 325 TABLET ORAL at 08:42

## 2023-02-01 RX ADMIN — DEXAMETHASONE 6 MG: 4 TABLET ORAL at 08:41

## 2023-02-01 RX ADMIN — GUAIFENESIN 400 MG: 200 SOLUTION ORAL at 08:42

## 2023-02-02 ENCOUNTER — READMISSION MANAGEMENT (OUTPATIENT)
Dept: CALL CENTER | Facility: HOSPITAL | Age: 68
End: 2023-02-02
Payer: MEDICARE

## 2023-02-02 VITALS
HEIGHT: 68 IN | DIASTOLIC BLOOD PRESSURE: 60 MMHG | OXYGEN SATURATION: 98 % | HEART RATE: 78 BPM | BODY MASS INDEX: 33.95 KG/M2 | TEMPERATURE: 96.5 F | RESPIRATION RATE: 18 BRPM | WEIGHT: 224 LBS | SYSTOLIC BLOOD PRESSURE: 120 MMHG

## 2023-02-02 PROBLEM — D89.831 CYTOKINE RELEASE SYNDROME, GRADE 1: Status: ACTIVE | Noted: 2023-02-02

## 2023-02-02 LAB
ANION GAP SERPL CALCULATED.3IONS-SCNC: 4 MMOL/L (ref 5–15)
BASOPHILS # BLD AUTO: 0.01 10*3/MM3 (ref 0–0.2)
BASOPHILS NFR BLD AUTO: 0.1 % (ref 0–1.5)
BUN SERPL-MCNC: 42 MG/DL (ref 8–23)
BUN/CREAT SERPL: 41.6 (ref 7–25)
CALCIUM SPEC-SCNC: 9.3 MG/DL (ref 8.6–10.5)
CHLORIDE SERPL-SCNC: 98 MMOL/L (ref 98–107)
CO2 SERPL-SCNC: 35 MMOL/L (ref 22–29)
CREAT SERPL-MCNC: 1.01 MG/DL (ref 0.76–1.27)
CRP SERPL-MCNC: 1.48 MG/DL (ref 0–0.5)
DEPRECATED RDW RBC AUTO: 53.8 FL (ref 37–54)
EGFRCR SERPLBLD CKD-EPI 2021: 82.5 ML/MIN/1.73
EOSINOPHIL # BLD AUTO: 0 10*3/MM3 (ref 0–0.4)
EOSINOPHIL NFR BLD AUTO: 0 % (ref 0.3–6.2)
ERYTHROCYTE [DISTWIDTH] IN BLOOD BY AUTOMATED COUNT: 16.3 % (ref 12.3–15.4)
GLUCOSE SERPL-MCNC: 139 MG/DL (ref 65–99)
HCT VFR BLD AUTO: 35.4 % (ref 37.5–51)
HGB BLD-MCNC: 10.9 G/DL (ref 13–17.7)
LYMPHOCYTES # BLD AUTO: 0.67 10*3/MM3 (ref 0.7–3.1)
LYMPHOCYTES NFR BLD AUTO: 5.4 % (ref 19.6–45.3)
MCH RBC QN AUTO: 27.9 PG (ref 26.6–33)
MCHC RBC AUTO-ENTMCNC: 30.8 G/DL (ref 31.5–35.7)
MCV RBC AUTO: 90.5 FL (ref 79–97)
MONOCYTES # BLD AUTO: 0.77 10*3/MM3 (ref 0.1–0.9)
MONOCYTES NFR BLD AUTO: 6.2 % (ref 5–12)
NEUTROPHILS NFR BLD AUTO: 10.9 10*3/MM3 (ref 1.7–7)
NEUTROPHILS NFR BLD AUTO: 87.2 % (ref 42.7–76)
PLATELET # BLD AUTO: 117 10*3/MM3 (ref 140–450)
PMV BLD AUTO: 10.2 FL (ref 6–12)
POTASSIUM SERPL-SCNC: 5 MMOL/L (ref 3.5–5.2)
RBC # BLD AUTO: 3.91 10*6/MM3 (ref 4.14–5.8)
SODIUM SERPL-SCNC: 137 MMOL/L (ref 136–145)
WBC NRBC COR # BLD: 12.49 10*3/MM3 (ref 3.4–10.8)

## 2023-02-02 PROCEDURE — 94761 N-INVAS EAR/PLS OXIMETRY MLT: CPT

## 2023-02-02 PROCEDURE — 94799 UNLISTED PULMONARY SVC/PX: CPT

## 2023-02-02 PROCEDURE — 86140 C-REACTIVE PROTEIN: CPT | Performed by: FAMILY MEDICINE

## 2023-02-02 PROCEDURE — 63710000001 DEXAMETHASONE PER 0.25 MG: Performed by: FAMILY MEDICINE

## 2023-02-02 PROCEDURE — 94664 DEMO&/EVAL PT USE INHALER: CPT

## 2023-02-02 PROCEDURE — 80048 BASIC METABOLIC PNL TOTAL CA: CPT | Performed by: FAMILY MEDICINE

## 2023-02-02 PROCEDURE — 85025 COMPLETE CBC W/AUTO DIFF WBC: CPT | Performed by: FAMILY MEDICINE

## 2023-02-02 RX ORDER — MELATONIN
2000 DAILY
Qty: 30 TABLET | Refills: 0 | Status: SHIPPED | OUTPATIENT
Start: 2023-02-02

## 2023-02-02 RX ORDER — CEFDINIR 300 MG/1
300 CAPSULE ORAL 2 TIMES DAILY
Qty: 10 CAPSULE | Refills: 0 | Status: SHIPPED | OUTPATIENT
Start: 2023-02-02 | End: 2023-02-07

## 2023-02-02 RX ORDER — MAGNESIUM OXIDE 400 MG/1
400 TABLET ORAL DAILY
Start: 2023-02-02

## 2023-02-02 RX ORDER — DEXAMETHASONE 6 MG/1
6 TABLET ORAL
Qty: 7 TABLET | Refills: 0 | Status: SHIPPED | OUTPATIENT
Start: 2023-02-02 | End: 2023-02-09

## 2023-02-02 RX ORDER — ZINC SULFATE 50(220)MG
220 CAPSULE ORAL DAILY
Qty: 30 CAPSULE | Refills: 0 | Status: SHIPPED | OUTPATIENT
Start: 2023-02-02

## 2023-02-02 RX ORDER — BUDESONIDE AND FORMOTEROL FUMARATE DIHYDRATE 160; 4.5 UG/1; UG/1
2 AEROSOL RESPIRATORY (INHALATION)
Qty: 10.2 EACH | Refills: 12 | Status: SHIPPED | OUTPATIENT
Start: 2023-02-02

## 2023-02-02 RX ORDER — ASCORBIC ACID 500 MG
500 TABLET ORAL DAILY
Qty: 15 TABLET | Refills: 0 | Status: SHIPPED | OUTPATIENT
Start: 2023-02-02

## 2023-02-02 RX ADMIN — GUAIFENESIN 400 MG: 200 SOLUTION ORAL at 09:14

## 2023-02-02 RX ADMIN — HYDROCODONE BITARTRATE AND ACETAMINOPHEN 1 TABLET: 10; 325 TABLET ORAL at 09:11

## 2023-02-02 RX ADMIN — OXYCODONE HYDROCHLORIDE AND ACETAMINOPHEN 500 MG: 500 TABLET ORAL at 09:11

## 2023-02-02 RX ADMIN — Medication 2000 UNITS: at 09:11

## 2023-02-02 RX ADMIN — LISINOPRIL 10 MG: 10 TABLET ORAL at 09:11

## 2023-02-02 RX ADMIN — CARVEDILOL 6.25 MG: 6.25 TABLET, FILM COATED ORAL at 09:12

## 2023-02-02 RX ADMIN — ZINC SULFATE 220 MG (50 MG) CAPSULE 220 MG: CAPSULE at 09:14

## 2023-02-02 RX ADMIN — DEXAMETHASONE 6 MG: 4 TABLET ORAL at 09:11

## 2023-02-02 RX ADMIN — ALBUTEROL SULFATE 2 PUFF: 90 AEROSOL, METERED RESPIRATORY (INHALATION) at 10:48

## 2023-02-02 RX ADMIN — BUDESONIDE AND FORMOTEROL FUMARATE DIHYDRATE 2 PUFF: 160; 4.5 AEROSOL RESPIRATORY (INHALATION) at 06:48

## 2023-02-02 RX ADMIN — FUROSEMIDE 40 MG: 40 TABLET ORAL at 09:11

## 2023-02-02 RX ADMIN — GABAPENTIN 300 MG: 300 CAPSULE ORAL at 05:18

## 2023-02-02 RX ADMIN — ALBUTEROL SULFATE 2 PUFF: 90 AEROSOL, METERED RESPIRATORY (INHALATION) at 06:48

## 2023-02-02 RX ADMIN — TAMSULOSIN HYDROCHLORIDE 0.4 MG: 0.4 CAPSULE ORAL at 09:11

## 2023-02-03 LAB
BACTERIA SPEC AEROBE CULT: NORMAL
BACTERIA SPEC AEROBE CULT: NORMAL

## 2023-02-08 ENCOUNTER — READMISSION MANAGEMENT (OUTPATIENT)
Dept: CALL CENTER | Facility: HOSPITAL | Age: 68
End: 2023-02-08
Payer: MEDICARE

## 2023-02-16 ENCOUNTER — READMISSION MANAGEMENT (OUTPATIENT)
Dept: CALL CENTER | Facility: HOSPITAL | Age: 68
End: 2023-02-16
Payer: MEDICARE

## 2023-02-25 DIAGNOSIS — G47.33 OSA (OBSTRUCTIVE SLEEP APNEA): Primary | ICD-10-CM

## 2023-07-18 PROBLEM — Z99.89 OSA ON CPAP: Status: ACTIVE | Noted: 2023-01-29

## 2023-07-18 PROBLEM — Z87.01 HISTORY OF PNEUMONIA: Status: ACTIVE | Noted: 2023-07-18

## 2023-07-18 PROBLEM — Z99.81 HYPOXEMIA REQUIRING SUPPLEMENTAL OXYGEN: Status: ACTIVE | Noted: 2023-07-18

## 2023-07-18 PROBLEM — R09.02 HYPOXEMIA REQUIRING SUPPLEMENTAL OXYGEN: Status: ACTIVE | Noted: 2023-07-18

## 2023-07-18 PROBLEM — J60 BLACK LUNG DISEASE: Status: ACTIVE | Noted: 2023-07-18

## 2023-07-18 PROBLEM — Z87.891 FORMER SMOKER: Status: ACTIVE | Noted: 2023-07-18

## 2023-07-18 PROBLEM — J84.9 ILD (INTERSTITIAL LUNG DISEASE): Status: ACTIVE | Noted: 2023-07-18

## 2023-07-18 PROBLEM — J30.89 NON-SEASONAL ALLERGIC RHINITIS: Status: ACTIVE | Noted: 2023-07-18

## 2023-07-18 PROBLEM — Z92.89 HISTORY OF MECHANICAL VENTILATION: Status: ACTIVE | Noted: 2023-07-18

## 2023-07-18 PROBLEM — R06.02 SOB (SHORTNESS OF BREATH) ON EXERTION: Status: ACTIVE | Noted: 2023-07-18

## 2023-07-28 ENCOUNTER — PATIENT ROUNDING (BHMG ONLY) (OUTPATIENT)
Dept: PULMONOLOGY | Facility: CLINIC | Age: 68
End: 2023-07-28
Payer: OTHER GOVERNMENT

## 2023-08-28 ENCOUNTER — HOSPITAL ENCOUNTER (INPATIENT)
Age: 68
LOS: 4 days | Discharge: HOME | DRG: 417 | End: 2023-09-02
Attending: EMERGENCY MEDICINE | Admitting: INTERNAL MEDICINE
Payer: OTHER GOVERNMENT

## 2023-08-28 DIAGNOSIS — N17.9 AKI (ACUTE KIDNEY INJURY) (HCC): ICD-10-CM

## 2023-08-28 DIAGNOSIS — K80.50 CHOLEDOCHOLITHIASIS: ICD-10-CM

## 2023-08-28 DIAGNOSIS — K85.10 GALL STONE PANCREATITIS: ICD-10-CM

## 2023-08-28 DIAGNOSIS — K85.10 GALLSTONE PANCREATITIS: Primary | ICD-10-CM

## 2023-08-28 LAB
BASOPHILS # BLD: 0 K/UL (ref 0–0.2)
BASOPHILS NFR BLD: 0.2 % (ref 0–1)
EOSINOPHIL # BLD: 0 K/UL (ref 0–0.6)
EOSINOPHIL NFR BLD: 0.1 % (ref 0–5)
ERYTHROCYTE [DISTWIDTH] IN BLOOD BY AUTOMATED COUNT: 18.8 % (ref 11.5–14.5)
HCT VFR BLD AUTO: 42.6 % (ref 42–52)
HGB BLD-MCNC: 13.2 G/DL (ref 14–18)
IMM GRANULOCYTES # BLD: 0.2 K/UL
LYMPHOCYTES # BLD: 0.4 K/UL (ref 1.1–4.5)
LYMPHOCYTES NFR BLD: 2.4 % (ref 20–40)
MCH RBC QN AUTO: 26.8 PG (ref 27–31)
MCHC RBC AUTO-ENTMCNC: 31 G/DL (ref 33–37)
MCV RBC AUTO: 86.6 FL (ref 80–94)
MONOCYTES # BLD: 0.5 K/UL (ref 0–0.9)
MONOCYTES NFR BLD: 2.9 % (ref 0–10)
NEUTROPHILS # BLD: 17.4 K/UL (ref 1.5–7.5)
NEUTS SEG NFR BLD: 93.6 % (ref 50–65)
PLATELET # BLD AUTO: 173 K/UL (ref 130–400)
PMV BLD AUTO: 11 FL (ref 9.4–12.4)
RBC # BLD AUTO: 4.92 M/UL (ref 4.7–6.1)
WBC # BLD AUTO: 18.6 K/UL (ref 4.8–10.8)

## 2023-08-28 PROCEDURE — 99284 EMERGENCY DEPT VISIT MOD MDM: CPT

## 2023-08-28 PROCEDURE — 2580000003 HC RX 258: Performed by: EMERGENCY MEDICINE

## 2023-08-28 RX ORDER — MORPHINE SULFATE 4 MG/ML
4 INJECTION, SOLUTION INTRAMUSCULAR; INTRAVENOUS ONCE
Status: COMPLETED | OUTPATIENT
Start: 2023-08-28 | End: 2023-08-29

## 2023-08-28 RX ORDER — GABAPENTIN 300 MG/1
300 CAPSULE ORAL 3 TIMES DAILY
COMMUNITY

## 2023-08-28 RX ORDER — MELATONIN 10 MG
20 CAPSULE ORAL NIGHTLY
COMMUNITY

## 2023-08-28 RX ORDER — CARVEDILOL 6.25 MG/1
6.25 TABLET ORAL 2 TIMES DAILY WITH MEALS
COMMUNITY

## 2023-08-28 RX ORDER — 0.9 % SODIUM CHLORIDE 0.9 %
1000 INTRAVENOUS SOLUTION INTRAVENOUS ONCE
Status: COMPLETED | OUTPATIENT
Start: 2023-08-28 | End: 2023-08-29

## 2023-08-28 RX ORDER — FUROSEMIDE 40 MG/1
40 TABLET ORAL DAILY
COMMUNITY

## 2023-08-28 RX ORDER — HYDROCODONE BITARTRATE AND ACETAMINOPHEN 10; 325 MG/1; MG/1
1 TABLET ORAL EVERY 6 HOURS PRN
COMMUNITY

## 2023-08-28 RX ORDER — BUDESONIDE AND FORMOTEROL FUMARATE DIHYDRATE 160; 4.5 UG/1; UG/1
2 AEROSOL RESPIRATORY (INHALATION) 2 TIMES DAILY
COMMUNITY

## 2023-08-28 RX ORDER — SPIRONOLACTONE 25 MG/1
25 TABLET ORAL DAILY
COMMUNITY

## 2023-08-28 RX ORDER — OMEPRAZOLE 20 MG/1
20 CAPSULE, DELAYED RELEASE ORAL DAILY
COMMUNITY

## 2023-08-28 RX ORDER — ONDANSETRON 2 MG/ML
4 INJECTION INTRAMUSCULAR; INTRAVENOUS ONCE
Status: COMPLETED | OUTPATIENT
Start: 2023-08-28 | End: 2023-08-29

## 2023-08-28 RX ORDER — TAMSULOSIN HYDROCHLORIDE 0.4 MG/1
0.4 CAPSULE ORAL DAILY
COMMUNITY

## 2023-08-28 RX ORDER — LANOLIN ALCOHOL/MO/W.PET/CERES
325 CREAM (GRAM) TOPICAL 2 TIMES DAILY
COMMUNITY

## 2023-08-28 RX ORDER — CALCIUM CARBONATE 300MG(750)
400 TABLET,CHEWABLE ORAL 2 TIMES DAILY
COMMUNITY

## 2023-08-28 RX ORDER — ATORVASTATIN CALCIUM 80 MG/1
80 TABLET, FILM COATED ORAL DAILY
COMMUNITY

## 2023-08-28 RX ORDER — M-VIT,TX,IRON,MINS/CALC/FOLIC 27MG-0.4MG
1 TABLET ORAL DAILY
COMMUNITY

## 2023-08-28 RX ORDER — ALBUTEROL SULFATE 0.63 MG/3ML
1 SOLUTION RESPIRATORY (INHALATION) EVERY 6 HOURS PRN
COMMUNITY

## 2023-08-28 RX ORDER — CETIRIZINE HYDROCHLORIDE 10 MG/1
10 TABLET ORAL DAILY
COMMUNITY

## 2023-08-28 RX ADMIN — SODIUM CHLORIDE 1000 ML: 9 INJECTION, SOLUTION INTRAVENOUS at 23:46

## 2023-08-28 ASSESSMENT — ENCOUNTER SYMPTOMS
ABDOMINAL PAIN: 1
SORE THROAT: 0
VOMITING: 1
SHORTNESS OF BREATH: 0
RHINORRHEA: 0
COUGH: 0
DIARRHEA: 0
NAUSEA: 1
BACK PAIN: 0

## 2023-08-28 ASSESSMENT — PAIN DESCRIPTION - DESCRIPTORS: DESCRIPTORS: ACHING

## 2023-08-28 ASSESSMENT — PAIN - FUNCTIONAL ASSESSMENT: PAIN_FUNCTIONAL_ASSESSMENT: 0-10

## 2023-08-28 ASSESSMENT — PAIN DESCRIPTION - LOCATION: LOCATION: ABDOMEN

## 2023-08-29 ENCOUNTER — APPOINTMENT (OUTPATIENT)
Dept: CT IMAGING | Age: 68
DRG: 417 | End: 2023-08-29
Payer: OTHER GOVERNMENT

## 2023-08-29 ENCOUNTER — ANESTHESIA EVENT (OUTPATIENT)
Dept: ENDOSCOPY | Age: 68
End: 2023-08-29
Payer: OTHER GOVERNMENT

## 2023-08-29 PROBLEM — K80.50 CHOLEDOCHOLITHIASIS: Status: ACTIVE | Noted: 2023-08-28

## 2023-08-29 PROBLEM — K85.10 ACUTE GALLSTONE PANCREATITIS: Status: ACTIVE | Noted: 2023-08-29

## 2023-08-29 LAB
ALBUMIN SERPL-MCNC: 3.2 G/DL (ref 3.5–5.2)
ALBUMIN SERPL-MCNC: 3.6 G/DL (ref 3.5–5.2)
ALP SERPL-CCNC: 325 U/L (ref 40–130)
ALP SERPL-CCNC: 401 U/L (ref 40–130)
ALT SERPL-CCNC: 237 U/L (ref 5–41)
ALT SERPL-CCNC: 278 U/L (ref 5–41)
ANION GAP SERPL CALCULATED.3IONS-SCNC: 16 MMOL/L (ref 7–19)
ANION GAP SERPL CALCULATED.3IONS-SCNC: 16 MMOL/L (ref 7–19)
AST SERPL-CCNC: 154 U/L (ref 5–40)
AST SERPL-CCNC: 178 U/L (ref 5–40)
BACTERIA #/AREA URNS HPF: ABNORMAL /HPF
BILIRUB SERPL-MCNC: 2.2 MG/DL (ref 0.2–1.2)
BILIRUB SERPL-MCNC: 2.4 MG/DL (ref 0.2–1.2)
BILIRUB UR QL STRIP: ABNORMAL
BUN SERPL-MCNC: 33 MG/DL (ref 8–23)
BUN SERPL-MCNC: 34 MG/DL (ref 8–23)
CALCIUM SERPL-MCNC: 8.3 MG/DL (ref 8.8–10.2)
CALCIUM SERPL-MCNC: 8.9 MG/DL (ref 8.8–10.2)
CHLORIDE SERPL-SCNC: 100 MMOL/L (ref 98–111)
CHLORIDE SERPL-SCNC: 104 MMOL/L (ref 98–111)
CLARITY UR: ABNORMAL
CO2 SERPL-SCNC: 23 MMOL/L (ref 22–29)
CO2 SERPL-SCNC: 24 MMOL/L (ref 22–29)
COLOR UR: ABNORMAL
CREAT SERPL-MCNC: 2.3 MG/DL (ref 0.5–1.2)
CREAT SERPL-MCNC: 2.5 MG/DL (ref 0.5–1.2)
CRYSTALS URNS MICRO: ABNORMAL /HPF
ERYTHROCYTE [DISTWIDTH] IN BLOOD BY AUTOMATED COUNT: 18.9 % (ref 11.5–14.5)
FINE GRAN CASTS #/AREA URNS HPF: ABNORMAL /LPF (ref 0–2)
GLUCOSE SERPL-MCNC: 76 MG/DL (ref 74–109)
GLUCOSE SERPL-MCNC: 81 MG/DL (ref 74–109)
GLUCOSE UR STRIP.AUTO-MCNC: NEGATIVE MG/DL
HCT VFR BLD AUTO: 37.3 % (ref 42–52)
HGB BLD-MCNC: 11.7 G/DL (ref 14–18)
HGB UR STRIP.AUTO-MCNC: NEGATIVE MG/L
KETONES UR STRIP.AUTO-MCNC: ABNORMAL MG/DL
LEUKOCYTE ESTERASE UR QL STRIP.AUTO: ABNORMAL
LIPASE SERPL-CCNC: >3000 U/L (ref 13–60)
LIPASE SERPL-CCNC: >3000 U/L (ref 13–60)
MCH RBC QN AUTO: 27.3 PG (ref 27–31)
MCHC RBC AUTO-ENTMCNC: 31.4 G/DL (ref 33–37)
MCV RBC AUTO: 86.9 FL (ref 80–94)
NITRITE UR QL STRIP.AUTO: NEGATIVE
PH UR STRIP.AUTO: 5 [PH] (ref 5–8)
PLATELET # BLD AUTO: 119 K/UL (ref 130–400)
PMV BLD AUTO: 11.1 FL (ref 9.4–12.4)
POTASSIUM SERPL-SCNC: 3.7 MMOL/L (ref 3.5–5)
POTASSIUM SERPL-SCNC: 4.5 MMOL/L (ref 3.5–5)
PROT SERPL-MCNC: 5.4 G/DL (ref 6.6–8.7)
PROT SERPL-MCNC: 6.4 G/DL (ref 6.6–8.7)
PROT UR STRIP.AUTO-MCNC: 100 MG/DL
RBC # BLD AUTO: 4.29 M/UL (ref 4.7–6.1)
SODIUM SERPL-SCNC: 140 MMOL/L (ref 136–145)
SODIUM SERPL-SCNC: 143 MMOL/L (ref 136–145)
SP GR UR STRIP.AUTO: 1.02 (ref 1–1.03)
UROBILINOGEN UR STRIP.AUTO-MCNC: 1 E.U./DL
WBC # BLD AUTO: 18.7 K/UL (ref 4.8–10.8)
WBC #/AREA URNS HPF: ABNORMAL /HPF (ref 0–5)

## 2023-08-29 PROCEDURE — 6370000000 HC RX 637 (ALT 250 FOR IP): Performed by: INTERNAL MEDICINE

## 2023-08-29 PROCEDURE — 2580000003 HC RX 258: Performed by: EMERGENCY MEDICINE

## 2023-08-29 PROCEDURE — 2580000003 HC RX 258: Performed by: HOSPITALIST

## 2023-08-29 PROCEDURE — 2580000003 HC RX 258: Performed by: SPECIALIST

## 2023-08-29 PROCEDURE — 96375 TX/PRO/DX INJ NEW DRUG ADDON: CPT

## 2023-08-29 PROCEDURE — 2700000000 HC OXYGEN THERAPY PER DAY

## 2023-08-29 PROCEDURE — 85025 COMPLETE CBC W/AUTO DIFF WBC: CPT

## 2023-08-29 PROCEDURE — 94760 N-INVAS EAR/PLS OXIMETRY 1: CPT

## 2023-08-29 PROCEDURE — 6370000000 HC RX 637 (ALT 250 FOR IP): Performed by: HOSPITALIST

## 2023-08-29 PROCEDURE — 6360000002 HC RX W HCPCS: Performed by: SPECIALIST

## 2023-08-29 PROCEDURE — 87040 BLOOD CULTURE FOR BACTERIA: CPT

## 2023-08-29 PROCEDURE — C9113 INJ PANTOPRAZOLE SODIUM, VIA: HCPCS | Performed by: SPECIALIST

## 2023-08-29 PROCEDURE — 96374 THER/PROPH/DIAG INJ IV PUSH: CPT

## 2023-08-29 PROCEDURE — 6360000002 HC RX W HCPCS: Performed by: HOSPITALIST

## 2023-08-29 PROCEDURE — 96376 TX/PRO/DX INJ SAME DRUG ADON: CPT

## 2023-08-29 PROCEDURE — 36415 COLL VENOUS BLD VENIPUNCTURE: CPT

## 2023-08-29 PROCEDURE — 80053 COMPREHEN METABOLIC PANEL: CPT

## 2023-08-29 PROCEDURE — 94640 AIRWAY INHALATION TREATMENT: CPT

## 2023-08-29 PROCEDURE — 81001 URINALYSIS AUTO W/SCOPE: CPT

## 2023-08-29 PROCEDURE — 83690 ASSAY OF LIPASE: CPT

## 2023-08-29 PROCEDURE — 6360000002 HC RX W HCPCS: Performed by: INTERNAL MEDICINE

## 2023-08-29 PROCEDURE — 74176 CT ABD & PELVIS W/O CONTRAST: CPT

## 2023-08-29 PROCEDURE — 1210000000 HC MED SURG R&B

## 2023-08-29 PROCEDURE — 2580000003 HC RX 258: Performed by: INTERNAL MEDICINE

## 2023-08-29 PROCEDURE — 85027 COMPLETE CBC AUTOMATED: CPT

## 2023-08-29 PROCEDURE — 6360000002 HC RX W HCPCS: Performed by: EMERGENCY MEDICINE

## 2023-08-29 RX ORDER — ONDANSETRON 2 MG/ML
4 INJECTION INTRAMUSCULAR; INTRAVENOUS EVERY 6 HOURS PRN
Status: DISCONTINUED | OUTPATIENT
Start: 2023-08-29 | End: 2023-09-02 | Stop reason: HOSPADM

## 2023-08-29 RX ORDER — ESZOPICLONE 1 MG/1
1 TABLET, FILM COATED ORAL NIGHTLY
COMMUNITY

## 2023-08-29 RX ORDER — CARVEDILOL 3.12 MG/1
3.12 TABLET ORAL 2 TIMES DAILY WITH MEALS
Status: DISCONTINUED | OUTPATIENT
Start: 2023-08-29 | End: 2023-09-02 | Stop reason: HOSPADM

## 2023-08-29 RX ORDER — ACETAMINOPHEN 325 MG/1
650 TABLET ORAL EVERY 6 HOURS PRN
Status: DISCONTINUED | OUTPATIENT
Start: 2023-08-29 | End: 2023-08-29

## 2023-08-29 RX ORDER — ENOXAPARIN SODIUM 100 MG/ML
1 INJECTION SUBCUTANEOUS 2 TIMES DAILY
Status: DISCONTINUED | OUTPATIENT
Start: 2023-08-29 | End: 2023-09-02

## 2023-08-29 RX ORDER — ZOLPIDEM TARTRATE 5 MG/1
5 TABLET ORAL NIGHTLY PRN
Status: DISCONTINUED | OUTPATIENT
Start: 2023-08-29 | End: 2023-08-30 | Stop reason: DRUGHIGH

## 2023-08-29 RX ORDER — ACETAMINOPHEN 650 MG/1
650 SUPPOSITORY RECTAL EVERY 6 HOURS PRN
Status: DISCONTINUED | OUTPATIENT
Start: 2023-08-29 | End: 2023-08-29

## 2023-08-29 RX ORDER — TAMSULOSIN HYDROCHLORIDE 0.4 MG/1
0.4 CAPSULE ORAL DAILY
Status: DISCONTINUED | OUTPATIENT
Start: 2023-08-29 | End: 2023-09-02 | Stop reason: HOSPADM

## 2023-08-29 RX ORDER — HYDROMORPHONE HYDROCHLORIDE 1 MG/ML
1 INJECTION, SOLUTION INTRAMUSCULAR; INTRAVENOUS; SUBCUTANEOUS
Status: DISCONTINUED | OUTPATIENT
Start: 2023-08-29 | End: 2023-09-02 | Stop reason: HOSPADM

## 2023-08-29 RX ORDER — SODIUM CHLORIDE 9 MG/ML
INJECTION, SOLUTION INTRAVENOUS CONTINUOUS
Status: DISCONTINUED | OUTPATIENT
Start: 2023-08-29 | End: 2023-09-02 | Stop reason: HOSPADM

## 2023-08-29 RX ORDER — BUDESONIDE AND FORMOTEROL FUMARATE DIHYDRATE 160; 4.5 UG/1; UG/1
2 AEROSOL RESPIRATORY (INHALATION) 2 TIMES DAILY
Status: DISCONTINUED | OUTPATIENT
Start: 2023-08-29 | End: 2023-09-02 | Stop reason: HOSPADM

## 2023-08-29 RX ORDER — 0.9 % SODIUM CHLORIDE 0.9 %
1000 INTRAVENOUS SOLUTION INTRAVENOUS ONCE
Status: COMPLETED | OUTPATIENT
Start: 2023-08-29 | End: 2023-08-29

## 2023-08-29 RX ORDER — ENOXAPARIN SODIUM 100 MG/ML
40 INJECTION SUBCUTANEOUS DAILY
Status: DISCONTINUED | OUTPATIENT
Start: 2023-08-30 | End: 2023-08-29

## 2023-08-29 RX ORDER — SODIUM CHLORIDE 9 MG/ML
INJECTION, SOLUTION INTRAVENOUS PRN
Status: DISCONTINUED | OUTPATIENT
Start: 2023-08-29 | End: 2023-08-31 | Stop reason: ALTCHOICE

## 2023-08-29 RX ORDER — MORPHINE SULFATE 4 MG/ML
4 INJECTION, SOLUTION INTRAMUSCULAR; INTRAVENOUS ONCE
Status: COMPLETED | OUTPATIENT
Start: 2023-08-29 | End: 2023-08-29

## 2023-08-29 RX ORDER — SODIUM CHLORIDE 0.9 % (FLUSH) 0.9 %
5-40 SYRINGE (ML) INJECTION PRN
Status: DISCONTINUED | OUTPATIENT
Start: 2023-08-29 | End: 2023-08-31 | Stop reason: ALTCHOICE

## 2023-08-29 RX ORDER — ONDANSETRON 4 MG/1
4 TABLET, ORALLY DISINTEGRATING ORAL EVERY 8 HOURS PRN
Status: DISCONTINUED | OUTPATIENT
Start: 2023-08-29 | End: 2023-09-02 | Stop reason: HOSPADM

## 2023-08-29 RX ORDER — GABAPENTIN 300 MG/1
300 CAPSULE ORAL 3 TIMES DAILY
Status: DISCONTINUED | OUTPATIENT
Start: 2023-08-29 | End: 2023-09-02 | Stop reason: HOSPADM

## 2023-08-29 RX ORDER — POLYETHYLENE GLYCOL 3350 17 G/17G
17 POWDER, FOR SOLUTION ORAL DAILY PRN
Status: DISCONTINUED | OUTPATIENT
Start: 2023-08-29 | End: 2023-09-02 | Stop reason: HOSPADM

## 2023-08-29 RX ORDER — MECOBALAMIN 5000 MCG
5 TABLET,DISINTEGRATING ORAL NIGHTLY PRN
Status: DISCONTINUED | OUTPATIENT
Start: 2023-08-29 | End: 2023-08-30

## 2023-08-29 RX ORDER — SODIUM CHLORIDE 0.9 % (FLUSH) 0.9 %
5-40 SYRINGE (ML) INJECTION EVERY 12 HOURS SCHEDULED
Status: DISCONTINUED | OUTPATIENT
Start: 2023-08-29 | End: 2023-08-31 | Stop reason: ALTCHOICE

## 2023-08-29 RX ORDER — CARVEDILOL 3.12 MG/1
6.25 TABLET ORAL 2 TIMES DAILY WITH MEALS
Status: DISCONTINUED | OUTPATIENT
Start: 2023-08-30 | End: 2023-08-29

## 2023-08-29 RX ADMIN — MORPHINE SULFATE 4 MG: 4 INJECTION, SOLUTION INTRAMUSCULAR; INTRAVENOUS at 00:11

## 2023-08-29 RX ADMIN — MORPHINE SULFATE 4 MG: 4 INJECTION, SOLUTION INTRAMUSCULAR; INTRAVENOUS at 02:04

## 2023-08-29 RX ADMIN — CARVEDILOL 3.12 MG: 3.12 TABLET, FILM COATED ORAL at 08:47

## 2023-08-29 RX ADMIN — GABAPENTIN 300 MG: 300 CAPSULE ORAL at 15:19

## 2023-08-29 RX ADMIN — GABAPENTIN 300 MG: 300 CAPSULE ORAL at 08:42

## 2023-08-29 RX ADMIN — ENOXAPARIN SODIUM 80 MG: 100 INJECTION SUBCUTANEOUS at 08:42

## 2023-08-29 RX ADMIN — TAMSULOSIN HYDROCHLORIDE 0.4 MG: 0.4 CAPSULE ORAL at 08:42

## 2023-08-29 RX ADMIN — GABAPENTIN 300 MG: 300 CAPSULE ORAL at 22:31

## 2023-08-29 RX ADMIN — SODIUM CHLORIDE, PRESERVATIVE FREE 40 MG: 5 INJECTION INTRAVENOUS at 08:47

## 2023-08-29 RX ADMIN — BUDESONIDE AND FORMOTEROL FUMARATE DIHYDRATE 2 PUFF: 160; 4.5 AEROSOL RESPIRATORY (INHALATION) at 19:45

## 2023-08-29 RX ADMIN — PIPERACILLIN AND TAZOBACTAM 3375 MG: 3; .375 INJECTION, POWDER, LYOPHILIZED, FOR SOLUTION INTRAVENOUS at 17:56

## 2023-08-29 RX ADMIN — PIPERACILLIN AND TAZOBACTAM 3375 MG: 3; .375 INJECTION, POWDER, LYOPHILIZED, FOR SOLUTION INTRAVENOUS at 10:19

## 2023-08-29 RX ADMIN — BUDESONIDE AND FORMOTEROL FUMARATE DIHYDRATE 2 PUFF: 160; 4.5 AEROSOL RESPIRATORY (INHALATION) at 06:30

## 2023-08-29 RX ADMIN — ONDANSETRON 4 MG: 2 INJECTION INTRAMUSCULAR; INTRAVENOUS at 00:05

## 2023-08-29 RX ADMIN — HYDROMORPHONE HYDROCHLORIDE 1 MG: 1 INJECTION, SOLUTION INTRAMUSCULAR; INTRAVENOUS; SUBCUTANEOUS at 06:32

## 2023-08-29 RX ADMIN — ZOLPIDEM TARTRATE 5 MG: 5 TABLET ORAL at 22:31

## 2023-08-29 RX ADMIN — PIPERACILLIN AND TAZOBACTAM 3375 MG: 3; .375 INJECTION, POWDER, LYOPHILIZED, FOR SOLUTION INTRAVENOUS at 02:06

## 2023-08-29 RX ADMIN — SODIUM CHLORIDE: 9 INJECTION, SOLUTION INTRAVENOUS at 02:04

## 2023-08-29 RX ADMIN — CARVEDILOL 3.12 MG: 3.12 TABLET, FILM COATED ORAL at 17:54

## 2023-08-29 RX ADMIN — SODIUM CHLORIDE 1000 ML: 9 INJECTION, SOLUTION INTRAVENOUS at 02:03

## 2023-08-29 ASSESSMENT — PAIN SCALES - GENERAL
PAINLEVEL_OUTOF10: 10
PAINLEVEL_OUTOF10: 5

## 2023-08-29 ASSESSMENT — PAIN DESCRIPTION - LOCATION: LOCATION: ABDOMEN

## 2023-08-29 ASSESSMENT — PAIN DESCRIPTION - DESCRIPTORS: DESCRIPTORS: STABBING;SHARP

## 2023-08-29 NOTE — CONSULTS
Consult Note              Today's date  8/29/2023      Hospital day # : 0      Patient Claudette Smith     YOB: 1955     Age:68 y.o. Inpatient consult to GI  Consult performed by: Rachael Sen MD  Consult ordered by: Fredy Penn MD          Consult reason:    Gallstone pancreatitis    Elevated LFTs, bili more than 2. Dilated CBD on CAT scan, 16  mm    Choledocholithiasis by CAT scan    Hypertension    Tachycardia    He thinks he had fever and chills last night. Leukocytosis    Quit tobacco 30 years ago    No alcohol abuse. Chief Complaint       Chief Complaint   Patient presents with    Nausea & Vomiting    Abdominal Pain     Patient reports he has known gallbladder issues and was discharged from [facility] today              History Obtained From       patient, electronic medical record    History of Present Illness     The patient is 61-year-old gentleman. He was discharge from [facility] after being admitted for abdominal pain. Plan was apparently outpatient cholecystectomy. Patient came back with abdominal pain, nausea, vomiting. LFTs were elevated. He had leukocytosis. CT scan showed gallstone pancreatitis. 8/29/2023: Bun 34, creatinine 2.5, GFR 28, glucose 81, calcium 8.3.    8/29/2023: Alk phos 335, , , bili 2.4, lipase more than 3000.    8/29/2023: WBC 18.7, hemoglobin 11.7, MCV 86, platelet count 160,819.    8/29/2023: CT abdomen and pelvis without contrast: Acute pancreatitis, cholelithiasis, choledocholithiasis and dilated CBD. No liver or splenic lesions. Peripancreatic inflammation. Choledocholithiasis with largest stone in the distal common bile duct measuring 4 mm. CBD dilated measuring 16 mm. No peripancreatic fluid collection. 2.5 cm cyst in the right kidney. Colonic diverticulosis. Patient was started on antibiotics.     The patient was initially started home health until 3-4 days ago when he (THERAPEUTIC MULTIVITAMIN-MINERALS) tablet Take 1 tablet by mouth daily   Yes Historical Provider, MD   ferrous sulfate (FE TABS 325) 325 (65 Fe) MG EC tablet Take 1 tablet by mouth 2 times daily   Yes Historical Provider, MD   cetirizine (ZYRTEC) 10 MG tablet Take 1 tablet by mouth daily   Yes Historical Provider, MD   HYDROcodone-acetaminophen (NORCO)  MG per tablet Take 1 tablet by mouth every 6 hours as needed for Pain. Yes Historical Provider, MD   gabapentin (NEURONTIN) 300 MG capsule Take 1 capsule by mouth 3 times daily.    Yes Historical Provider, MD   melatonin 10 MG CAPS capsule Take 1 capsule by mouth nightly   Yes Historical Provider, MD   budesonide-formoterol (SYMBICORT) 160-4.5 MCG/ACT AERO Inhale 2 puffs into the lungs 2 times daily   Yes Historical Provider, MD   albuterol (ACCUNEB) 0.63 MG/3ML nebulizer solution Take 3 mLs by nebulization every 6 hours as needed for Wheezing   Yes Historical Provider, MD        0.9 % sodium chloride infusion, Continuous  sodium chloride flush 0.9 % injection 5-40 mL, 2 times per day  sodium chloride flush 0.9 % injection 5-40 mL, PRN  0.9 % sodium chloride infusion, PRN  ondansetron (ZOFRAN-ODT) disintegrating tablet 4 mg, Q8H PRN   Or  ondansetron (ZOFRAN) injection 4 mg, Q6H PRN  polyethylene glycol (GLYCOLAX) packet 17 g, Daily PRN  budesonide-formoterol (SYMBICORT) 160-4.5 MCG/ACT inhaler 2 puff, BID  [START ON 8/30/2023] carvedilol (COREG) tablet 6.25 mg, BID WC  gabapentin (NEURONTIN) capsule 300 mg, TID  tamsulosin (FLOMAX) capsule 0.4 mg, Daily  enoxaparin (LOVENOX) injection 80 mg, BID  HYDROmorphone HCl PF (DILAUDID) injection 1 mg, Q3H PRN        Allergies       Xopenex [levalbuterol hcl]    Social History       Social History       Tobacco History       Smoking Status  Former Quit Date  1990 Smoking Frequency  1 pack/day for 12.00 years (12.00 pk-yrs) Smoking Tobacco Type  Cigarettes quit in 1990      Smokeless Tobacco Use  Never      Tobacco

## 2023-08-29 NOTE — PROGRESS NOTES
4 Eyes Skin Assessment     NAME:  Yamileth Burnett OF BIRTH:  1955  MEDICAL RECORD NUMBER:  [id number]    The patient is being assessed for  Admission    I agree that at least one RN has performed a thorough Head to Toe Skin Assessment on the patient. ALL assessment sites listed below have been assessed. Areas assessed by both nurses:    Head, Face, Ears, Shoulders, Back, Chest, Arms, Elbows, Hands, Sacrum. Buttock, Coccyx, Ischium, Legs. Feet and Heels, and Under Medical Devices         Does the Patient have a Wound?  No noted wound(s)       Samuel Prevention initiated by RN: No  Wound Care Orders initiated by RN: No    Pressure Injury (Stage 3,4, Unstageable, DTI, NWPT, and Complex wounds) if present, place Wound referral order by RN under : No    New Ostomies, if present place, Ostomy referral order under : No     Nurse 1 eSignature: Electronically signed by Dewayne Michaels RN on 8/29/23 at 5:41 AM CDT    **SHARE this note so that the co-signing nurse can place an eSignature**    Nurse 2 eSignature: Electronically signed by Melania Dozier RN on 8/29/23 at 5:45 AM CDT

## 2023-08-29 NOTE — PROGRESS NOTES
Asked to review pt/chart for biliary pancreatitis.      Noted LFT elevation, abnormal CT and elevated Lipase    Plan   - continue Holding Xarelto  - Plan for ERCP while inpatient  - likely tomorrow   - NPO after MD

## 2023-08-29 NOTE — PROGRESS NOTES
Physician Progress Note      PATIENT:               Tae Patten  CSN #:                  [id number]  :                       1955  ADMIT DATE:       2023 11:18 PM  [address] DATE:  RESPONDING  PROVIDER #:        Jana Pederson MD          QUERY TEXT:    Patient admitted with biliary pancreatitis and noted to have WBC 18.6 with HR    and documented acute kidney injury. If possible, please document in   progress notes and discharge summary if you are evaluating and/or treating any   of the following: The medical record reflects the following:  Risk Factors: biliary pancreatitis, MULU  Clinical Indicators: WBC 18.6, HR , BP 87/55, crea 2.3  Treatment: NS 1000 ml/IV bolus, NS @ 250 ml/hr, Zosyn, GI consult, blood   culture    Thank you,  Trinidad Ramos, [facility]  [phone]  Options provided:  -- SIRS of non-infectious origin due to biliary pancreatitis with MULU  -- Other - I will add my own diagnosis  -- Disagree - Not applicable / Not valid  -- Disagree - Clinically unable to determine / Unknown  -- Refer to Clinical Documentation Reviewer    PROVIDER RESPONSE TEXT:    This patient has SIRS of non-infectious origin due to biliary pancreatitis   with MULU. Query created by: Jaki Mckay on 2023 1:06 PM      QUERY TEXT:    Pt admitted with biliary pancreatitis. Pt noted to have chronic O2 at 3l/nc. If possible, please document in the progress notes and discharge summary if   you are evaluating and/or treating any of the following:     The medical record reflects the following:  Risk Factors: Black lung, hx of smoking  Clinical Indicators: chronic O2 at 3l/nc  Treatment: continuation of home O2    Thank you,  Trinidad Ramos, [facility]  [phone]  Options provided:  -- Chronic respiratory failure with hypoxia  -- Chronic respiratory failure with hypercapnia  -- Chronic respiratory failure with hypoxia and hypercapnia  -- Other - I will add my own diagnosis  -- Disagree - Not applicable / Not valid  -- Disagree - Clinically unable to determine / Unknown  -- Refer to Clinical Documentation Reviewer    PROVIDER RESPONSE TEXT:    This patient has chronic respiratory failure with hypoxia.     Query created by: Luis Carlos Dill on 8/29/2023 1:09 PM      Electronically signed by:  Jay Chang MD 8/29/2023 4:46 PM

## 2023-08-29 NOTE — PROGRESS NOTES
Nutrition Assessment     Type and Reason for Visit: Initial, Positive Nutrition Screen    Nutrition Recommendations/Plan:   Follow for nutrition progression     Malnutrition Assessment:  Malnutrition Status: Insufficient data    Nutrition Assessment:  +NS for weight loss. Per chart review, pt has had 7% wt loss in past month. Pt is NPO for ERCP tomorrow, will follow for diet advancement after procedure. Estimated Daily Nutrient Needs:  Energy (kcal):  6143-2195 Weight Used for Energy Requirements: Admission     Protein (g):  90 Weight Used for Protein Requirements: Ideal        Fluid (ml/day):  0070-6231 Method Used for Fluid Requirements: 1 ml/kcal    Nutrition Related Findings:     Wound Type: None    Current Nutrition Therapies:    Diet NPO    Anthropometric Measures:  Height: 5' 7.5\" (171.5 cm)  Current Body Wt: 187 lb (84.8 kg)   BMI: 28.8    Nutrition Diagnosis:   Inadequate oral intake related to altered GI function as evidenced by nausea, vomiting, weight loss    Nutrition Interventions:   Food and/or Nutrient Delivery: Continue NPO  Nutrition Education/Counseling: No recommendation at this time  Coordination of Nutrition Care: Continue to monitor while inpatient       Goals:     Goals: Initiate PO diet       Nutrition Monitoring and Evaluation:   Behavioral-Environmental Outcomes: None Identified  Food/Nutrient Intake Outcomes: Diet Advancement/Tolerance  Physical Signs/Symptoms Outcomes: Biochemical Data, Nausea or Vomiting, Weight    Discharge Planning:     Too soon to determine     Bryan Garrido MS, RD, LD  Contact: [phone]

## 2023-08-29 NOTE — PROGRESS NOTES
Pt given urinal encouraged to void for needed urine sample. Pt verbalized understanding.  Electronically signed by Simon Borges RN on 8/29/2023 at 6:37 AM

## 2023-08-30 ENCOUNTER — ANESTHESIA (OUTPATIENT)
Dept: ENDOSCOPY | Age: 68
End: 2023-08-30
Payer: OTHER GOVERNMENT

## 2023-08-30 ENCOUNTER — APPOINTMENT (OUTPATIENT)
Dept: GENERAL RADIOLOGY | Age: 68
DRG: 417 | End: 2023-08-30
Payer: OTHER GOVERNMENT

## 2023-08-30 ENCOUNTER — TELEPHONE (OUTPATIENT)
Dept: GASTROENTEROLOGY | Age: 68
End: 2023-08-30

## 2023-08-30 LAB
ALBUMIN SERPL-MCNC: 2.7 G/DL (ref 3.5–5.2)
ALP SERPL-CCNC: 243 U/L (ref 40–130)
ALT SERPL-CCNC: 172 U/L (ref 5–41)
ANION GAP SERPL CALCULATED.3IONS-SCNC: 12 MMOL/L (ref 7–19)
AST SERPL-CCNC: 111 U/L (ref 5–40)
BILIRUB SERPL-MCNC: 1.3 MG/DL (ref 0.2–1.2)
BUN SERPL-MCNC: 36 MG/DL (ref 8–23)
CALCIUM SERPL-MCNC: 7.8 MG/DL (ref 8.8–10.2)
CHLORIDE SERPL-SCNC: 110 MMOL/L (ref 98–111)
CO2 SERPL-SCNC: 17 MMOL/L (ref 22–29)
CREAT SERPL-MCNC: 1.8 MG/DL (ref 0.5–1.2)
GLUCOSE SERPL-MCNC: 73 MG/DL (ref 74–109)
LIPASE SERPL-CCNC: 653 U/L (ref 13–60)
POTASSIUM SERPL-SCNC: 4.6 MMOL/L (ref 3.5–5)
PROT SERPL-MCNC: 4.8 G/DL (ref 6.6–8.7)
SODIUM SERPL-SCNC: 139 MMOL/L (ref 136–145)

## 2023-08-30 PROCEDURE — 6370000000 HC RX 637 (ALT 250 FOR IP): Performed by: HOSPITALIST

## 2023-08-30 PROCEDURE — 7100000001 HC PACU RECOVERY - ADDTL 15 MIN: Performed by: INTERNAL MEDICINE

## 2023-08-30 PROCEDURE — BF101ZZ FLUOROSCOPY OF BILE DUCTS USING LOW OSMOLAR CONTRAST: ICD-10-PCS | Performed by: INTERNAL MEDICINE

## 2023-08-30 PROCEDURE — 6370000000 HC RX 637 (ALT 250 FOR IP): Performed by: NURSE ANESTHETIST, CERTIFIED REGISTERED

## 2023-08-30 PROCEDURE — 6370000000 HC RX 637 (ALT 250 FOR IP): Performed by: ANESTHESIOLOGY

## 2023-08-30 PROCEDURE — 80053 COMPREHEN METABOLIC PANEL: CPT

## 2023-08-30 PROCEDURE — C1769 GUIDE WIRE: HCPCS | Performed by: INTERNAL MEDICINE

## 2023-08-30 PROCEDURE — 6360000002 HC RX W HCPCS: Performed by: NURSE ANESTHETIST, CERTIFIED REGISTERED

## 2023-08-30 PROCEDURE — 3700000000 HC ANESTHESIA ATTENDED CARE: Performed by: INTERNAL MEDICINE

## 2023-08-30 PROCEDURE — 2709999900 HC NON-CHARGEABLE SUPPLY: Performed by: INTERNAL MEDICINE

## 2023-08-30 PROCEDURE — 74328 X-RAY BILE DUCT ENDOSCOPY: CPT

## 2023-08-30 PROCEDURE — 0FC98ZZ EXTIRPATION OF MATTER FROM COMMON BILE DUCT, VIA NATURAL OR ARTIFICIAL OPENING ENDOSCOPIC: ICD-10-PCS | Performed by: INTERNAL MEDICINE

## 2023-08-30 PROCEDURE — 6360000002 HC RX W HCPCS: Performed by: HOSPITALIST

## 2023-08-30 PROCEDURE — 2700000000 HC OXYGEN THERAPY PER DAY

## 2023-08-30 PROCEDURE — 2580000003 HC RX 258: Performed by: HOSPITALIST

## 2023-08-30 PROCEDURE — 6360000002 HC RX W HCPCS: Performed by: INTERNAL MEDICINE

## 2023-08-30 PROCEDURE — C1874 STENT, COATED/COV W/DEL SYS: HCPCS | Performed by: INTERNAL MEDICINE

## 2023-08-30 PROCEDURE — 3700000001 HC ADD 15 MINUTES (ANESTHESIA): Performed by: INTERNAL MEDICINE

## 2023-08-30 PROCEDURE — 2720000010 HC SURG SUPPLY STERILE: Performed by: INTERNAL MEDICINE

## 2023-08-30 PROCEDURE — C9399 UNCLASSIFIED DRUGS OR BIOLOG: HCPCS | Performed by: NURSE ANESTHETIST, CERTIFIED REGISTERED

## 2023-08-30 PROCEDURE — 83690 ASSAY OF LIPASE: CPT

## 2023-08-30 PROCEDURE — 6370000000 HC RX 637 (ALT 250 FOR IP): Performed by: INTERNAL MEDICINE

## 2023-08-30 PROCEDURE — 7100000000 HC PACU RECOVERY - FIRST 15 MIN: Performed by: INTERNAL MEDICINE

## 2023-08-30 PROCEDURE — 94760 N-INVAS EAR/PLS OXIMETRY 1: CPT

## 2023-08-30 PROCEDURE — 1210000000 HC MED SURG R&B

## 2023-08-30 PROCEDURE — 94640 AIRWAY INHALATION TREATMENT: CPT

## 2023-08-30 PROCEDURE — 2580000003 HC RX 258: Performed by: INTERNAL MEDICINE

## 2023-08-30 PROCEDURE — 2580000003 HC RX 258: Performed by: NURSE ANESTHETIST, CERTIFIED REGISTERED

## 2023-08-30 PROCEDURE — 0F798DZ DILATION OF COMMON BILE DUCT WITH INTRALUMINAL DEVICE, VIA NATURAL OR ARTIFICIAL OPENING ENDOSCOPIC: ICD-10-PCS | Performed by: INTERNAL MEDICINE

## 2023-08-30 PROCEDURE — 36415 COLL VENOUS BLD VENIPUNCTURE: CPT

## 2023-08-30 PROCEDURE — 2500000003 HC RX 250 WO HCPCS: Performed by: NURSE ANESTHETIST, CERTIFIED REGISTERED

## 2023-08-30 PROCEDURE — 2580000003 HC RX 258: Performed by: SPECIALIST

## 2023-08-30 PROCEDURE — 3609015200 HC ERCP REMOVE CALCULI/DEBRIS BILIARY/PANCREAS DUCT: Performed by: INTERNAL MEDICINE

## 2023-08-30 PROCEDURE — 6360000002 HC RX W HCPCS: Performed by: SPECIALIST

## 2023-08-30 PROCEDURE — C9113 INJ PANTOPRAZOLE SODIUM, VIA: HCPCS | Performed by: SPECIALIST

## 2023-08-30 DEVICE — STENT SYSTEM RMV
Type: IMPLANTABLE DEVICE | Site: BILE DUCT | Status: FUNCTIONAL
Brand: WALLFLEX BILIARY

## 2023-08-30 RX ORDER — MECOBALAMIN 5000 MCG
20 TABLET,DISINTEGRATING ORAL NIGHTLY PRN
Status: DISCONTINUED | OUTPATIENT
Start: 2023-08-30 | End: 2023-09-02 | Stop reason: HOSPADM

## 2023-08-30 RX ORDER — METOCLOPRAMIDE HYDROCHLORIDE 5 MG/ML
10 INJECTION INTRAMUSCULAR; INTRAVENOUS
Status: DISCONTINUED | OUTPATIENT
Start: 2023-08-30 | End: 2023-08-31 | Stop reason: ALTCHOICE

## 2023-08-30 RX ORDER — FUROSEMIDE 40 MG/1
40 TABLET ORAL DAILY
Status: DISCONTINUED | OUTPATIENT
Start: 2023-08-30 | End: 2023-09-02 | Stop reason: HOSPADM

## 2023-08-30 RX ORDER — LIDOCAINE HYDROCHLORIDE 10 MG/ML
1 INJECTION, SOLUTION EPIDURAL; INFILTRATION; INTRACAUDAL; PERINEURAL
Status: DISCONTINUED | OUTPATIENT
Start: 2023-08-30 | End: 2023-08-31 | Stop reason: ALTCHOICE

## 2023-08-30 RX ORDER — FAMOTIDINE 20 MG/1
20 TABLET, FILM COATED ORAL ONCE
Status: COMPLETED | OUTPATIENT
Start: 2023-08-30 | End: 2023-08-30

## 2023-08-30 RX ORDER — ONDANSETRON 2 MG/ML
INJECTION INTRAMUSCULAR; INTRAVENOUS PRN
Status: DISCONTINUED | OUTPATIENT
Start: 2023-08-30 | End: 2023-08-30 | Stop reason: SDUPTHER

## 2023-08-30 RX ORDER — ALBUTEROL SULFATE 90 UG/1
AEROSOL, METERED RESPIRATORY (INHALATION) PRN
Status: DISCONTINUED | OUTPATIENT
Start: 2023-08-30 | End: 2023-08-30 | Stop reason: SDUPTHER

## 2023-08-30 RX ORDER — MEPERIDINE HYDROCHLORIDE 25 MG/ML
12.5 INJECTION INTRAMUSCULAR; INTRAVENOUS; SUBCUTANEOUS EVERY 5 MIN PRN
Status: DISCONTINUED | OUTPATIENT
Start: 2023-08-30 | End: 2023-08-31 | Stop reason: ALTCHOICE

## 2023-08-30 RX ORDER — MORPHINE SULFATE 2 MG/ML
2 INJECTION, SOLUTION INTRAMUSCULAR; INTRAVENOUS EVERY 5 MIN PRN
Status: DISCONTINUED | OUTPATIENT
Start: 2023-08-30 | End: 2023-08-31 | Stop reason: ALTCHOICE

## 2023-08-30 RX ORDER — IPRATROPIUM BROMIDE AND ALBUTEROL SULFATE 2.5; .5 MG/3ML; MG/3ML
1 SOLUTION RESPIRATORY (INHALATION) ONCE
Status: COMPLETED | OUTPATIENT
Start: 2023-08-30 | End: 2023-08-30

## 2023-08-30 RX ORDER — SODIUM CHLORIDE 9 MG/ML
INJECTION, SOLUTION INTRAVENOUS PRN
Status: DISCONTINUED | OUTPATIENT
Start: 2023-08-30 | End: 2023-09-01 | Stop reason: HOSPADM

## 2023-08-30 RX ORDER — LABETALOL HYDROCHLORIDE 5 MG/ML
10 INJECTION, SOLUTION INTRAVENOUS
Status: DISCONTINUED | OUTPATIENT
Start: 2023-08-30 | End: 2023-09-01 | Stop reason: HOSPADM

## 2023-08-30 RX ORDER — MORPHINE SULFATE 4 MG/ML
4 INJECTION, SOLUTION INTRAMUSCULAR; INTRAVENOUS EVERY 5 MIN PRN
Status: DISCONTINUED | OUTPATIENT
Start: 2023-08-30 | End: 2023-08-31 | Stop reason: ALTCHOICE

## 2023-08-30 RX ORDER — SODIUM CHLORIDE 0.9 % (FLUSH) 0.9 %
5-40 SYRINGE (ML) INJECTION EVERY 12 HOURS SCHEDULED
Status: DISCONTINUED | OUTPATIENT
Start: 2023-08-30 | End: 2023-09-01 | Stop reason: HOSPADM

## 2023-08-30 RX ORDER — SODIUM CHLORIDE 0.9 % (FLUSH) 0.9 %
5-40 SYRINGE (ML) INJECTION EVERY 12 HOURS SCHEDULED
Status: DISCONTINUED | OUTPATIENT
Start: 2023-08-30 | End: 2023-08-31 | Stop reason: ALTCHOICE

## 2023-08-30 RX ORDER — ZOLPIDEM TARTRATE 5 MG/1
10 TABLET ORAL NIGHTLY PRN
Status: DISCONTINUED | OUTPATIENT
Start: 2023-08-30 | End: 2023-09-02 | Stop reason: HOSPADM

## 2023-08-30 RX ORDER — SODIUM CHLORIDE 9 MG/ML
INJECTION, SOLUTION INTRAVENOUS PRN
Status: DISCONTINUED | OUTPATIENT
Start: 2023-08-30 | End: 2023-08-31 | Stop reason: ALTCHOICE

## 2023-08-30 RX ORDER — MIDAZOLAM HYDROCHLORIDE 2 MG/2ML
2 INJECTION, SOLUTION INTRAMUSCULAR; INTRAVENOUS
Status: DISCONTINUED | OUTPATIENT
Start: 2023-08-30 | End: 2023-08-31 | Stop reason: ALTCHOICE

## 2023-08-30 RX ORDER — DIPHENHYDRAMINE HYDROCHLORIDE 50 MG/ML
12.5 INJECTION INTRAMUSCULAR; INTRAVENOUS
Status: ACTIVE | OUTPATIENT
Start: 2023-08-30 | End: 2023-08-31

## 2023-08-30 RX ORDER — DROPERIDOL 2.5 MG/ML
0.62 INJECTION, SOLUTION INTRAMUSCULAR; INTRAVENOUS
Status: ACTIVE | OUTPATIENT
Start: 2023-08-30 | End: 2023-08-31

## 2023-08-30 RX ORDER — ROCURONIUM BROMIDE 10 MG/ML
INJECTION, SOLUTION INTRAVENOUS PRN
Status: DISCONTINUED | OUTPATIENT
Start: 2023-08-30 | End: 2023-08-30 | Stop reason: SDUPTHER

## 2023-08-30 RX ORDER — SODIUM CHLORIDE, SODIUM LACTATE, POTASSIUM CHLORIDE, CALCIUM CHLORIDE 600; 310; 30; 20 MG/100ML; MG/100ML; MG/100ML; MG/100ML
INJECTION, SOLUTION INTRAVENOUS CONTINUOUS PRN
Status: DISCONTINUED | OUTPATIENT
Start: 2023-08-30 | End: 2023-08-30 | Stop reason: SDUPTHER

## 2023-08-30 RX ORDER — ZOLPIDEM TARTRATE 10 MG/1
10 TABLET ORAL NIGHTLY PRN
COMMUNITY

## 2023-08-30 RX ORDER — SCOLOPAMINE TRANSDERMAL SYSTEM 1 MG/1
1 PATCH, EXTENDED RELEASE TRANSDERMAL
Status: DISCONTINUED | OUTPATIENT
Start: 2023-08-30 | End: 2023-09-01

## 2023-08-30 RX ORDER — FENTANYL CITRATE 50 UG/ML
INJECTION, SOLUTION INTRAMUSCULAR; INTRAVENOUS PRN
Status: DISCONTINUED | OUTPATIENT
Start: 2023-08-30 | End: 2023-08-30 | Stop reason: SDUPTHER

## 2023-08-30 RX ORDER — MIDAZOLAM HYDROCHLORIDE 1 MG/ML
INJECTION INTRAMUSCULAR; INTRAVENOUS PRN
Status: DISCONTINUED | OUTPATIENT
Start: 2023-08-30 | End: 2023-08-30 | Stop reason: SDUPTHER

## 2023-08-30 RX ORDER — SODIUM CHLORIDE 0.9 % (FLUSH) 0.9 %
5-40 SYRINGE (ML) INJECTION PRN
Status: DISCONTINUED | OUTPATIENT
Start: 2023-08-30 | End: 2023-09-01 | Stop reason: HOSPADM

## 2023-08-30 RX ORDER — SODIUM CHLORIDE 0.9 % (FLUSH) 0.9 %
5-40 SYRINGE (ML) INJECTION PRN
Status: DISCONTINUED | OUTPATIENT
Start: 2023-08-30 | End: 2023-08-31 | Stop reason: ALTCHOICE

## 2023-08-30 RX ORDER — PROPOFOL 10 MG/ML
INJECTION, EMULSION INTRAVENOUS PRN
Status: DISCONTINUED | OUTPATIENT
Start: 2023-08-30 | End: 2023-08-30 | Stop reason: SDUPTHER

## 2023-08-30 RX ORDER — LIDOCAINE HYDROCHLORIDE 10 MG/ML
INJECTION, SOLUTION EPIDURAL; INFILTRATION; INTRACAUDAL; PERINEURAL PRN
Status: DISCONTINUED | OUTPATIENT
Start: 2023-08-30 | End: 2023-08-30 | Stop reason: SDUPTHER

## 2023-08-30 RX ORDER — LORAZEPAM 2 MG/ML
0.5 INJECTION INTRAMUSCULAR
Status: DISCONTINUED | OUTPATIENT
Start: 2023-08-30 | End: 2023-08-31 | Stop reason: ALTCHOICE

## 2023-08-30 RX ADMIN — HYDROMORPHONE HYDROCHLORIDE 1 MG: 1 INJECTION, SOLUTION INTRAMUSCULAR; INTRAVENOUS; SUBCUTANEOUS at 09:29

## 2023-08-30 RX ADMIN — LIDOCAINE HYDROCHLORIDE 40 MG: 10 INJECTION, SOLUTION EPIDURAL; INFILTRATION; INTRACAUDAL; PERINEURAL at 13:50

## 2023-08-30 RX ADMIN — Medication 10 ML: at 08:11

## 2023-08-30 RX ADMIN — ROCURONIUM BROMIDE 50 MG: 50 INJECTION INTRAVENOUS at 13:48

## 2023-08-30 RX ADMIN — MIDAZOLAM 2 MG: 1 INJECTION INTRAMUSCULAR; INTRAVENOUS at 13:46

## 2023-08-30 RX ADMIN — FUROSEMIDE 40 MG: 40 TABLET ORAL at 16:06

## 2023-08-30 RX ADMIN — SUGAMMADEX 339 MG: 100 INJECTION, SOLUTION INTRAVENOUS at 14:26

## 2023-08-30 RX ADMIN — GABAPENTIN 300 MG: 300 CAPSULE ORAL at 08:06

## 2023-08-30 RX ADMIN — PROPOFOL 200 MG: 10 INJECTION, EMULSION INTRAVENOUS at 13:50

## 2023-08-30 RX ADMIN — BUDESONIDE AND FORMOTEROL FUMARATE DIHYDRATE 2 PUFF: 160; 4.5 AEROSOL RESPIRATORY (INHALATION) at 19:51

## 2023-08-30 RX ADMIN — ALBUTEROL SULFATE 4 PUFF: 90 AEROSOL, METERED RESPIRATORY (INHALATION) at 13:48

## 2023-08-30 RX ADMIN — SODIUM CHLORIDE, PRESERVATIVE FREE 40 MG: 5 INJECTION INTRAVENOUS at 08:06

## 2023-08-30 RX ADMIN — FENTANYL CITRATE 50 MCG: 50 INJECTION INTRAMUSCULAR; INTRAVENOUS at 13:46

## 2023-08-30 RX ADMIN — CARVEDILOL 3.12 MG: 3.12 TABLET, FILM COATED ORAL at 09:40

## 2023-08-30 RX ADMIN — IPRATROPIUM BROMIDE AND ALBUTEROL SULFATE 1 DOSE: .5; 3 SOLUTION RESPIRATORY (INHALATION) at 15:05

## 2023-08-30 RX ADMIN — Medication 20 MG: at 20:30

## 2023-08-30 RX ADMIN — TAMSULOSIN HYDROCHLORIDE 0.4 MG: 0.4 CAPSULE ORAL at 08:06

## 2023-08-30 RX ADMIN — PIPERACILLIN AND TAZOBACTAM 3375 MG: 3; .375 INJECTION, POWDER, LYOPHILIZED, FOR SOLUTION INTRAVENOUS at 17:44

## 2023-08-30 RX ADMIN — BUDESONIDE AND FORMOTEROL FUMARATE DIHYDRATE 2 PUFF: 160; 4.5 AEROSOL RESPIRATORY (INHALATION) at 07:34

## 2023-08-30 RX ADMIN — PIPERACILLIN AND TAZOBACTAM 3375 MG: 3; .375 INJECTION, POWDER, LYOPHILIZED, FOR SOLUTION INTRAVENOUS at 02:49

## 2023-08-30 RX ADMIN — FAMOTIDINE 20 MG: 20 TABLET ORAL at 16:12

## 2023-08-30 RX ADMIN — ONDANSETRON 4 MG: 2 INJECTION INTRAMUSCULAR; INTRAVENOUS at 13:49

## 2023-08-30 RX ADMIN — ENOXAPARIN SODIUM 80 MG: 100 INJECTION SUBCUTANEOUS at 20:30

## 2023-08-30 RX ADMIN — ZOLPIDEM TARTRATE 10 MG: 5 TABLET ORAL at 20:30

## 2023-08-30 RX ADMIN — CARVEDILOL 3.12 MG: 3.12 TABLET, FILM COATED ORAL at 17:46

## 2023-08-30 RX ADMIN — SODIUM CHLORIDE, SODIUM LACTATE, POTASSIUM CHLORIDE, AND CALCIUM CHLORIDE: 600; 310; 30; 20 INJECTION, SOLUTION INTRAVENOUS at 13:45

## 2023-08-30 RX ADMIN — GABAPENTIN 300 MG: 300 CAPSULE ORAL at 20:29

## 2023-08-30 RX ADMIN — SODIUM CHLORIDE: 9 INJECTION, SOLUTION INTRAVENOUS at 00:17

## 2023-08-30 RX ADMIN — PIPERACILLIN AND TAZOBACTAM 3375 MG: 3; .375 INJECTION, POWDER, LYOPHILIZED, FOR SOLUTION INTRAVENOUS at 11:15

## 2023-08-30 ASSESSMENT — LIFESTYLE VARIABLES: SMOKING_STATUS: 1

## 2023-08-30 ASSESSMENT — ENCOUNTER SYMPTOMS: SHORTNESS OF BREATH: 0

## 2023-08-30 ASSESSMENT — PAIN SCALES - GENERAL: PAINLEVEL_OUTOF10: 6

## 2023-08-30 NOTE — OP NOTE
Endoscopic Procedure Note    Patient: Leslie Schultz: 1955  [facility] Rec#: [id number] Acc#: [id number]     Primary Care Provider SANKET Cochran - CNP  Referring Provider:     Endoscopist: Krissy Liang MD    Date of Procedure:  8/30/2023     Procedure:   1. ERCP with stone removal  2. ERCP with stent placement  3. Intra procedure interpretation of biliary cholangiogram     Indications:   1. Biliary pancreatitis       Anesthesia:  General     Estimated Blood Loss: minimal    Procedure:   Prior to the procedure the patient's chart was reviewed and informed consent was obtained. Risk and Benefits (Risks including but not limited to bleeding, perforation, infection, 8 to 10% risk of post ERCP pancreatitis, and even death) were discussed with the patient. They were agreeable to continue. Patient was brought to the operating room, underwent general anesthesia, and placed in the prone position. A side-viewing duodenoscope was advanced from the oropharynx down to the distal duodenum and reduced into a short position opposite the ampulla. The ampulla appeared bulging with an impacted stone. A  film was obtained which appeared normal.     The bile duct was then cannulated using a 0.035 x 260 cm straight Dreamwire. A short nose traction sphincterotome was advanced over the wire and the bile duct was deeply cannulated. Contrast was injected. I personally interpreted the bile duct images. The flow of contrast was adequate. Contrast injection showed filling defects and narrowing at the ampulla. The pancreatic duct was not cannulated nor injected. To help discover objects an approximate 1 cm biliary sphincterotomy was made using a monofilament autotome and electrocautery. There was minimal post sphincterotomy bleeding. Amount of thick dark bile as well as 2 small biliary stones flowed through the ampulla post sphincterotomy.  The bile duct was swept multiple times starting the bifurcation

## 2023-08-30 NOTE — PROGRESS NOTES
[facility] Hospital[facility] Progress Note    Patient:  Madiha Harris  YOB: 1955  Date of Service: 8/30/2023  MRN: [id number]   Acct: [de-identified]   Primary Care Physician: SANKET Lorenzana CNP  Advance Directive: Full Code  Admit Date: 8/28/2023       Hospital Day: 1        CHIEF COMPLAINT:     Chief Complaint   Patient presents with    Nausea & Vomiting    Abdominal Pain     Patient reports he has known gallbladder issues and was discharged from [facility]         8/30/2023 9:14 AM  Subjective / Interval History:   08/30/2023  Patient seen and examined. Doing well. No new complaints. No acute changes or acute overnight event reported. Patient endorses some shortness of breath. Otherwise denies any other acute complaints or distress at this time. Review of Systems:   Review of Systems  ROS: 14 point review of systems is negative except as specifically addressed above.     Diet NPO    Intake/Output Summary (Last 24 hours) at 8/30/2023 0914  Last data filed at 8/30/2023 0142  Gross per 24 hour   Intake 3200 ml   Output 1150 ml   Net 2050 ml       Medications:   sodium chloride 250 mL/hr at 08/30/23 0017    sodium chloride       Current Facility-Administered Medications   Medication Dose Route Frequency Provider Last Rate Last Admin    0.9 % sodium chloride infusion   IntraVENous Continuous Chema Marquez  mL/hr at 08/30/23 0017 New Bag at 08/30/23 0017    sodium chloride flush 0.9 % injection 5-40 mL  5-40 mL IntraVENous 2 times per day Chema Marquez MD   10 mL at 08/30/23 5861    sodium chloride flush 0.9 % injection 5-40 mL  5-40 mL IntraVENous PRN Chema Marquez MD        0.9 % sodium chloride infusion   IntraVENous PRN Chema Marquez MD        ondansetron (ZOFRAN-ODT) disintegrating tablet 4 mg  4 mg Oral Q8H PRN Chema Marquez MD        Or    ondansetron [facility]) injection 4 mg  4 mg IntraVENous Q6H PRN

## 2023-08-30 NOTE — ANESTHESIA POSTPROCEDURE EVALUATION
Department of Anesthesiology  Postprocedure Note    Patient: Lila Braun  MRN: [id number]  YOB: 1955  Date of evaluation: 8/30/2023      Procedure Summary     Date: 08/30/23 Room / Location: [address]    Anesthesia Start: 0353 Anesthesia Stop: 4359    Procedure: ERCP STONE REMOVAL/STENT INSERTION (Abdomen) Diagnosis:       Choledocholithiasis      Acute biliary pancreatitis      (Choledocholithiasis [K80.50])      (Acute biliary pancreatitis [K85.10])    Surgeons: Parvin Hale MD Responsible Provider: SANKET Coats CRNA    Anesthesia Type: general ASA Status: 3          Anesthesia Type: No value filed.     Sang Phase I: Sang Score: 5    Sang Phase II:        Anesthesia Post Evaluation    Patient location during evaluation: PACU  Patient participation: complete - patient participated  Level of consciousness: sleepy but conscious  Pain score: 0  Airway patency: patent  Nausea & Vomiting: no nausea and no vomiting  Complications: no  Cardiovascular status: hemodynamically stable and blood pressure returned to baseline  Respiratory status: acceptable and nasal cannula  Hydration status: stable  Pain management: adequate

## 2023-08-30 NOTE — CONSULTS
[facility]    Patient ID: Gilda Renetta  79 y.o.  male  YOB: 1955    Admitting Diagnosis: Acute gallstone pancreatitis [K85.10]    Chief Complaint:  Chief Complaint   Patient presents with    Nausea & Vomiting    Abdominal Pain     Patient reports he has known gallbladder issues and was discharged from [facility] today         Subjective:    Mr. Li is a 79 y.o. male who presented to the emergency department with abdominal pain associated with nonbloody nonbilious emesis. The patient was evaluated at bedside yesterday and again this morning. He described the pain as upper abdominal and radiating from one side of his rib cage to the other across the epigastrium. This is a second attack of such pain. He had initially presented to an outside hospital with his first episode and reports being told that he needed to have his gallbladder out. Given that he experienced a second attack Monday he decided to present to the emergency department where he would be more willing to undergo a cholecystectomy if needed. He denies the presence of any fevers or chills. At present, pain has subsided and the patient feels improved, this was the case yesterday afternoon as well. CT work-up in the ER reported the presence of acute pancreatitis, cholelithiasis, and choledocholithiasis and a dilated common bile duct. Patient was seen by gastroenterology. There are plans for an ERCP likely today. Anticoagulation: Xarelto. This is currently on hold. On presentation, his lipase was over 3000 and it has trended down to about 653 this morning. His results are elevated and his total bili has trended down from 2.4-1.3.     Past Medical History:   Diagnosis Date    Black lung ([address])     GERD (gastroesophageal reflux disease), as per medication reconciliation     History of tobacco abuse     Hypertension     Other chronic back pain     back broken in [place] in

## 2023-08-30 NOTE — TELEPHONE ENCOUNTER
Armando Murguia,    Per Dr Shae Mujica today:    IMPRESSION:  1. Biliary pancreatitis due to choledocholithiasis  2. Small amount of pus in the bile duct consistent with questionable cholangitis     RECOMMENDATIONS:    1. Clear liquid diet today with advancing diet tomorrow as tolerated. 2.  Repeat ERCP in 3 months for stent removal  3. Antibiotics x7 days  4. Cystectomy timing per general surgery service     The results were discussed with the patient and family. A copy of the images obtained were given to the patient.       Abdirizak Zuniga MD  8/30/2023  2:25 PM              ED to Hosp-Admission (Current) on 8/28/2023

## 2023-08-30 NOTE — PROGRESS NOTES
Patient appears short of breath. Dr. Abdi Kappa at bedside to evaluate. Patient denies feeling short of breath. Duo neb ordered and given. Patient appears less short of breath, able to finish sentences.

## 2023-08-30 NOTE — ANESTHESIA PRE PROCEDURE
Department of Anesthesiology  Preprocedure Note       Name:  Deysi Mckinley   Age:  79 y.o.  :  1955                                          MRN:  [id number]         Date:  2023      Surgeon: Harris Galvez):  Ki Henry MD    Procedure: Procedure(s):  ERCP STONE REMOVAL    Medications prior to admission:   Prior to Admission medications    Medication Sig Start Date End Date Taking? Authorizing Provider   eszopiclone (LUNESTA) 1 MG TABS Take 1 tablet by mouth nightly. Max Daily Amount: 1 mg   Yes Historical Provider, MD   spironolactone (ALDACTONE) 25 MG tablet Take 1 tablet by mouth daily   Yes Historical Provider, MD   tamsulosin (FLOMAX) 0.4 MG capsule Take 1 capsule by mouth daily   Yes Historical Provider, MD   carvedilol (COREG) 6.25 MG tablet Take 1 tablet by mouth 2 times daily (with meals)   Yes Historical Provider, MD   omeprazole (PRILOSEC) 20 MG delayed release capsule Take 1 capsule by mouth daily   Yes Historical Provider, MD   furosemide (LASIX) 40 MG tablet Take 1 tablet by mouth daily   Yes Historical Provider, MD   atorvastatin (LIPITOR) 80 MG tablet Take 1 tablet by mouth daily   Yes Historical Provider, MD   rivaroxaban (XARELTO) 20 MG TABS tablet Take 1 tablet by mouth   Yes Historical Provider, MD   Magnesium 400 MG TABS Take 400 mg by mouth in the morning and at bedtime   Yes Historical Provider, MD   Multiple Vitamins-Minerals (THERAPEUTIC MULTIVITAMIN-MINERALS) tablet Take 1 tablet by mouth daily   Yes Historical Provider, MD   ferrous sulfate (FE TABS 325) 325 (65 Fe) MG EC tablet Take 1 tablet by mouth 2 times daily   Yes Historical Provider, MD   cetirizine (ZYRTEC) 10 MG tablet Take 1 tablet by mouth daily   Yes Historical Provider, MD   HYDROcodone-acetaminophen (NORCO)  MG per tablet Take 1 tablet by mouth every 6 hours as needed for Pain. Yes Historical Provider, MD   gabapentin (NEURONTIN) 300 MG capsule Take 1 capsule by mouth 3 times daily.    Yes Historical

## 2023-08-30 NOTE — H&P (VIEW-ONLY)
Status: He is alert and oriented to person, place, and time. Psychiatric:         Mood and Affect: Mood normal.         Behavior: Behavior normal.       Data:  CBC:   Recent Labs     08/28/23 2342 08/29/23 0407   WBC 18.6* 18.7*   RBC 4.92 4.29*   HGB 13.2* 11.7*   HCT 42.6 37.3*   MCV 86.6 86.9   RDW 18.8* 18.9*    119*     BMP:   Recent Labs     08/28/23 2342 08/29/23 0407 08/30/23  0616    143 139   K 4.5 3.7 4.6    104 110   CO2 24 23 17*   ANIONGAP 16 16 12   GLUCOSE 76 81 73*   CREATININE 2.3* 2.5* 1.8*   LABGLOM 31* 28* 41*   CALCIUM 8.9 8.3* 7.8*     LFT:   Recent Labs     08/28/23 2342 08/29/23 0407 08/30/23  0616   PROT 6.4* 5.4* 4.8*   LABALBU 3.6 3.2* 2.7*   BILITOT 2.2* 2.4* 1.3*   ALKPHOS 401* 325* 243*   * 237* 172*   * 154* 111*     PT/INR:No results for input(s): PROTIME, INR in the last 72 hours. CT-scan of the abdomen  FINDINGS:  Lung bases are free of consolidation. No acute osseous abnormalities. Cholelithiasis. No liver or splenic lesions. There is peripancreatic inflammation. Choledocholithiasis with largest stone in the distal common bile duct measuring about 4 mm. The common bile duct is dilated measuring 1.6 cm in caliber. No   peripancreatic fluid collections. 2.5 cm cyst of the right kidney. No renal stones and no hydronephrosis. No abdominal aortic aneurysm. The adrenal glands are unremarkable. No bladder wall thickening. No bowel obstruction. No free air. The   appendix is normal.  Colonic diverticulosis     IMPRESSION:  Impression:  1. Acute pancreatitis. 2.  Choledocholithiasis and dilated common bile duct. 3.  Cholelithiasis     Assessment:     Principal Problem:    Acute gallstone pancreatitis  Active Problems:    Choledocholithiasis  Resolved Problems:    * No resolved hospital problems. *      Plan:   Gallstone pancreatitis. Improving with medical treatment. Choledocholithiasis.   Plans made for ERCP by gastroenterology. Cholelithiasis and gallbladder sludge. Patient will benefit from an interval cholecystectomy, which can be arranged during his admission as his pancreatitis continues to improve and his posterior CP course stable. Thank you for the referral and opportunity to be involved in his care. I will follow along    To assess his readiness for cholecystectomy.     Electronically signed by Linda Diego MD on 8/30/2023 at 9:41 AM

## 2023-08-31 ENCOUNTER — ANESTHESIA (OUTPATIENT)
Dept: OPERATING ROOM | Age: 68
End: 2023-08-31
Payer: OTHER GOVERNMENT

## 2023-08-31 PROBLEM — E43 SEVERE MALNUTRITION (HCC): Chronic | Status: ACTIVE | Noted: 2023-08-31

## 2023-08-31 LAB
ALBUMIN SERPL-MCNC: 3 G/DL (ref 3.5–5.2)
ALP SERPL-CCNC: 327 U/L (ref 40–130)
ALT SERPL-CCNC: 216 U/L (ref 5–41)
ANION GAP SERPL CALCULATED.3IONS-SCNC: 10 MMOL/L (ref 7–19)
AST SERPL-CCNC: 129 U/L (ref 5–40)
BILIRUB SERPL-MCNC: 1.3 MG/DL (ref 0.2–1.2)
BNP BLD-MCNC: 7750 PG/ML (ref 0–124)
BUN SERPL-MCNC: 36 MG/DL (ref 8–23)
CALCIUM SERPL-MCNC: 9.2 MG/DL (ref 8.8–10.2)
CHLORIDE SERPL-SCNC: 102 MMOL/L (ref 98–111)
CO2 SERPL-SCNC: 26 MMOL/L (ref 22–29)
CREAT SERPL-MCNC: 1.7 MG/DL (ref 0.5–1.2)
ERYTHROCYTE [DISTWIDTH] IN BLOOD BY AUTOMATED COUNT: 19.4 % (ref 11.5–14.5)
GLUCOSE SERPL-MCNC: 143 MG/DL (ref 74–109)
HCT VFR BLD AUTO: 36.4 % (ref 42–52)
HGB BLD-MCNC: 11.2 G/DL (ref 14–18)
LIPASE SERPL-CCNC: 603 U/L (ref 13–60)
MCH RBC QN AUTO: 27.2 PG (ref 27–31)
MCHC RBC AUTO-ENTMCNC: 30.8 G/DL (ref 33–37)
MCV RBC AUTO: 88.3 FL (ref 80–94)
PLATELET # BLD AUTO: 100 K/UL (ref 130–400)
PMV BLD AUTO: 12.4 FL (ref 9.4–12.4)
POTASSIUM SERPL-SCNC: 4.5 MMOL/L (ref 3.5–5)
PROT SERPL-MCNC: 6.8 G/DL (ref 6.6–8.7)
RBC # BLD AUTO: 4.12 M/UL (ref 4.7–6.1)
SODIUM SERPL-SCNC: 138 MMOL/L (ref 136–145)
TROPONIN T SERPL-MCNC: 0.02 NG/ML (ref 0–0.03)
WBC # BLD AUTO: 12.7 K/UL (ref 4.8–10.8)

## 2023-08-31 PROCEDURE — 94760 N-INVAS EAR/PLS OXIMETRY 1: CPT

## 2023-08-31 PROCEDURE — 6360000002 HC RX W HCPCS: Performed by: INTERNAL MEDICINE

## 2023-08-31 PROCEDURE — 6360000002 HC RX W HCPCS: Performed by: SPECIALIST

## 2023-08-31 PROCEDURE — 36415 COLL VENOUS BLD VENIPUNCTURE: CPT

## 2023-08-31 PROCEDURE — 1210000000 HC MED SURG R&B

## 2023-08-31 PROCEDURE — 6370000000 HC RX 637 (ALT 250 FOR IP): Performed by: INTERNAL MEDICINE

## 2023-08-31 PROCEDURE — 83690 ASSAY OF LIPASE: CPT

## 2023-08-31 PROCEDURE — 85027 COMPLETE CBC AUTOMATED: CPT

## 2023-08-31 PROCEDURE — 94640 AIRWAY INHALATION TREATMENT: CPT

## 2023-08-31 PROCEDURE — 6360000002 HC RX W HCPCS: Performed by: HOSPITALIST

## 2023-08-31 PROCEDURE — 6370000000 HC RX 637 (ALT 250 FOR IP): Performed by: HOSPITALIST

## 2023-08-31 PROCEDURE — C9113 INJ PANTOPRAZOLE SODIUM, VIA: HCPCS | Performed by: SPECIALIST

## 2023-08-31 PROCEDURE — 2700000000 HC OXYGEN THERAPY PER DAY

## 2023-08-31 PROCEDURE — 83880 ASSAY OF NATRIURETIC PEPTIDE: CPT

## 2023-08-31 PROCEDURE — 80053 COMPREHEN METABOLIC PANEL: CPT

## 2023-08-31 PROCEDURE — 84484 ASSAY OF TROPONIN QUANT: CPT

## 2023-08-31 PROCEDURE — 2580000003 HC RX 258: Performed by: INTERNAL MEDICINE

## 2023-08-31 PROCEDURE — 2580000003 HC RX 258: Performed by: SPECIALIST

## 2023-08-31 RX ADMIN — CARVEDILOL 3.12 MG: 3.12 TABLET, FILM COATED ORAL at 17:11

## 2023-08-31 RX ADMIN — BUDESONIDE AND FORMOTEROL FUMARATE DIHYDRATE 2 PUFF: 160; 4.5 AEROSOL RESPIRATORY (INHALATION) at 19:43

## 2023-08-31 RX ADMIN — GABAPENTIN 300 MG: 300 CAPSULE ORAL at 08:44

## 2023-08-31 RX ADMIN — GABAPENTIN 300 MG: 300 CAPSULE ORAL at 19:58

## 2023-08-31 RX ADMIN — SODIUM CHLORIDE, PRESERVATIVE FREE 40 MG: 5 INJECTION INTRAVENOUS at 08:43

## 2023-08-31 RX ADMIN — ENOXAPARIN SODIUM 80 MG: 100 INJECTION SUBCUTANEOUS at 08:44

## 2023-08-31 RX ADMIN — PIPERACILLIN AND TAZOBACTAM 3375 MG: 3; .375 INJECTION, POWDER, LYOPHILIZED, FOR SOLUTION INTRAVENOUS at 05:11

## 2023-08-31 RX ADMIN — PIPERACILLIN AND TAZOBACTAM 3375 MG: 3; .375 INJECTION, POWDER, LYOPHILIZED, FOR SOLUTION INTRAVENOUS at 13:06

## 2023-08-31 RX ADMIN — FUROSEMIDE 40 MG: 40 TABLET ORAL at 08:43

## 2023-08-31 RX ADMIN — TAMSULOSIN HYDROCHLORIDE 0.4 MG: 0.4 CAPSULE ORAL at 08:43

## 2023-08-31 RX ADMIN — GABAPENTIN 300 MG: 300 CAPSULE ORAL at 13:05

## 2023-08-31 RX ADMIN — CARVEDILOL 3.12 MG: 3.12 TABLET, FILM COATED ORAL at 08:43

## 2023-08-31 RX ADMIN — BUDESONIDE AND FORMOTEROL FUMARATE DIHYDRATE 2 PUFF: 160; 4.5 AEROSOL RESPIRATORY (INHALATION) at 10:05

## 2023-08-31 RX ADMIN — ENOXAPARIN SODIUM 80 MG: 100 INJECTION SUBCUTANEOUS at 19:58

## 2023-08-31 RX ADMIN — PIPERACILLIN AND TAZOBACTAM 3375 MG: 3; .375 INJECTION, POWDER, LYOPHILIZED, FOR SOLUTION INTRAVENOUS at 20:01

## 2023-08-31 RX ADMIN — ZOLPIDEM TARTRATE 10 MG: 5 TABLET ORAL at 19:58

## 2023-08-31 NOTE — PROGRESS NOTES
[facility]    Patient:  Nicholas Hill  YOB: 1955  Date of Service: 8/31/2023  MRN: [id number]   Acct: [de-identified]   Primary Care Physician: SANKET Shaw CNP  Advance Directive: Full Code  Admit Date: 8/28/2023       Hospital Day: 2        CHIEF COMPLAINT:     Chief Complaint   Patient presents with    Nausea & Vomiting    Abdominal Pain     Patient reports he has known gallbladder issues and was discharged from [facility]         8/31/2023 10:30 AM  Subjective / Interval History:   08/31/2023  No acute changes or acute overnight event reported. No new complaints. Previously reported shortness of breath improved. Lying comfortably in bed in no apparent acute distress. Patient denies any acute complaints or distress. 08/30/2023  Patient seen and examined. Doing well. No new complaints. No acute changes or acute overnight event reported. Patient endorses some shortness of breath. Otherwise denies any other acute complaints or distress at this time. Review of Systems:   Review of Systems  ROS: 14 point review of systems is negative except as specifically addressed above. ADULT DIET;  Clear Liquid    Intake/Output Summary (Last 24 hours) at 8/31/2023 1030  Last data filed at 8/31/2023 1017  Gross per 24 hour   Intake 1000 ml   Output 300 ml   Net 700 ml         Medications:   sodium chloride      sodium chloride      sodium chloride 250 mL/hr at 08/30/23 0017    sodium chloride       Current Facility-Administered Medications   Medication Dose Route Frequency Provider Last Rate Last Admin    indomethacin (INDOCIN) 50 MG suppository 100 mg  100 mg Rectal Once Radha Pardo MD        sodium chloride flush 0.9 % injection 5-40 mL  5-40 mL IntraVENous 2 times per day Shannan Moseley MD        sodium chloride flush 0.9 % injection 5-40 mL  5-40 mL IntraVENous PRN Shannan Moseley MD        0.9 % sodium chloride infusion Coloration: Skin is not jaundiced or pale. Findings: No bruising, erythema, lesion or rash. Neurological:      General: No focal deficit present. Mental Status: He is alert and oriented to person, place, and time. Cranial Nerves: No cranial nerve deficit. Sensory: No sensory deficit. Motor: No weakness. Coordination: Coordination normal.   Psychiatric:         Mood and Affect: Mood normal.         Behavior: Behavior normal.         Thought Content: Thought content normal.         Judgment: Judgment normal.         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    GERD (gastroesophageal reflux disease), as per medication reconciliation     Black lung ([address])     History of tobacco abuse     Ischemia of left upper extremity 08/27/2021       Hospital Problems             Last Modified POA    * (Principal) Acute gallstone pancreatitis 8/29/2023 Yes    Ischemia of left upper extremity 8/30/2023 Yes    GERD (gastroesophageal reflux disease), as per medication reconciliation 8/30/2023 Yes    Arterial embolism and thrombosis of upper extremity ([address]) 8/30/2023 Yes    Choledocholithiasis 8/29/2023 Unknown    Overview Signed 8/29/2023  8:44 AM by Darío Davila MD     Added automatically from request for surgery [id number]          Principal Problem:    Acute gallstone pancreatitis  Active Problems:    Ischemia of left upper extremity    GERD (gastroesophageal reflux disease), as per medication reconciliation    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  *      Brief History/Summary:   Mr Jamarcus Dwyer presents to [facility] with

## 2023-08-31 NOTE — PROGRESS NOTES
Comprehensive Nutrition Assessment    Type and Reason for Visit:  Reassess    Nutrition Recommendations/Plan:   Follow for diet advancement, order ONS. Malnutrition Assessment:  Malnutrition Status:  Severe malnutrition (08/31/23 1550)    Context:  Chronic Illness     Findings of the 6 clinical characteristics of malnutrition:  Energy Intake:  75% or less estimated energy requirements for 1 month or longer  Weight Loss:  Greater than 10% over 6 months     Body Fat Loss:  Mild body fat loss Triceps   Muscle Mass Loss:  Mild muscle mass loss Temples (temporalis), Hand (interosseous)  Fluid Accumulation:  Mild     Strength:  Not Performed    Nutrition Assessment:    Pt meets criteria for severe malnutriton. Pt reports decreased intake, has had wt loss over past 6 months. Pt has hx of black lung, appears to be SOA on NC. Tolerating clear liquids, awaiting surgery tomorrow. Will follow for diet advancement and order ONS. Nutrition Related Findings:    NC. Wound Type: None       Current Nutrition Intake & Therapies:    Average Meal Intake: 1-25%     ADULT DIET; Clear Liquid  Diet NPO    Anthropometric Measures:  Height: 5' 7.5\" (171.5 cm)  Ideal Body Weight (IBW): 151 lbs (69 kg)    Admission Body Weight: 187 lb (84.8 kg)  Current Body Weight: 187 lb (84.8 kg),   IBW. Current BMI (kg/m2): 28.8  Usual Body Weight:  (14% wt loss in 6 months.)  % Weight Change (Calculated): -7                    BMI Categories: Overweight (BMI 25.0-29. 9)    Estimated Daily Nutrient Needs:  Energy Requirements Based On: Kcal/kg  Weight Used for Energy Requirements: Admission  Energy (kcal/day): 3624-7649  Weight Used for Protein Requirements: Ideal  Protein (g/day): 90  Method Used for Fluid Requirements: 1 ml/kcal  Fluid (ml/day): 5059-1828    Nutrition Diagnosis:   Severe malnutrition, In context of chronic illness related to catabolic illness, inadequate protein-energy intake as evidenced by Criteria as identified in malnutrition assessment    Nutrition Interventions:   Food and/or Nutrient Delivery: Continue Current Diet  Nutrition Education/Counseling: No recommendation at this time  Coordination of Nutrition Care: Continue to monitor while inpatient       Goals:     Goals: Meet at least 75% of estimated needs       Nutrition Monitoring and Evaluation:   Behavioral-Environmental Outcomes: None Identified  Food/Nutrient Intake Outcomes: Diet Advancement/Tolerance  Physical Signs/Symptoms Outcomes: Biochemical Data, GI Status, Weight    Discharge Planning:     Too soon to determine     Robert Dunaway MS, RD, LD  Contact: [phone]

## 2023-08-31 NOTE — PROGRESS NOTES
Patient had ERCP, stone removal and biliary stent placement by Dr. David Mina. Please schedule follow-up with Dr. David Mina in [address] [place] [address] in about 4 weeks after discharge to schedule removal of inability stent. I have signed off. Please call if further assistance needed in the management of this patient. I will not be  follow-up with patient as outpatient basis since I am locum tenens physician and I do not have an office practice. Locum tenens possibilities    To whom it may concern. I am a locum tenens physician. Therefore,  I do not have an office practice. I will not be able to follow-up on any patient as an outpatient basis. I will not be able to follow-up on the results of pending labs, pathology reports,  imaging studies. I will not be able to or do any outpatient procedures. I will not be able to provide continuity of care of any kind to any patient . I  will not be able to keep in touch with any patient by any means such as phone, text or by e-mail because of the nature of my responsibilities as a locum tenens physician. When the patient is discharged, my patient physician relationship is over. The patient will need to follow-up with other physicians in the hospital when I am not on-call. After discharge, patient will need to follow-up with their primary care doctor, gastroenterologist or liver doctor for continuity of care, check on pending labs, check on pending pathology and check on results of the pending imaging studies-such as x-rays, ultrasound, CAT scan, MRI, HIDA scan, gastric emptying scan or any other studies. For ER physicians, hospitalists and other physicians involved in patient care: If a patient needs follow-up GI care, please schedule follow-up with patient's PCP, gastroenterologist or hepatologist on their health plan. Thank you.     Magdiel Ellis MD

## 2023-09-01 ENCOUNTER — ANESTHESIA EVENT (OUTPATIENT)
Dept: OPERATING ROOM | Age: 68
End: 2023-09-01
Payer: OTHER GOVERNMENT

## 2023-09-01 LAB
ALBUMIN SERPL-MCNC: 3.2 G/DL (ref 3.5–5.2)
ALP SERPL-CCNC: 310 U/L (ref 40–130)
ALT SERPL-CCNC: 167 U/L (ref 5–41)
ANION GAP SERPL CALCULATED.3IONS-SCNC: 9 MMOL/L (ref 7–19)
AST SERPL-CCNC: 66 U/L (ref 5–40)
BASOPHILS # BLD: 0 K/UL (ref 0–0.2)
BASOPHILS NFR BLD: 0.1 % (ref 0–1)
BILIRUB DIRECT SERPL-MCNC: 0.5 MG/DL (ref 0–0.3)
BILIRUB INDIRECT SERPL-MCNC: 0.5 MG/DL (ref 0.1–1)
BILIRUB SERPL-MCNC: 1 MG/DL (ref 0.2–1.2)
BUN SERPL-MCNC: 27 MG/DL (ref 8–23)
CALCIUM SERPL-MCNC: 9.6 MG/DL (ref 8.8–10.2)
CHLORIDE SERPL-SCNC: 101 MMOL/L (ref 98–111)
CO2 SERPL-SCNC: 30 MMOL/L (ref 22–29)
CREAT SERPL-MCNC: 1.2 MG/DL (ref 0.5–1.2)
EOSINOPHIL # BLD: 0.1 K/UL (ref 0–0.6)
EOSINOPHIL NFR BLD: 0.4 % (ref 0–5)
ERYTHROCYTE [DISTWIDTH] IN BLOOD BY AUTOMATED COUNT: 19.7 % (ref 11.5–14.5)
GLUCOSE SERPL-MCNC: 82 MG/DL (ref 74–109)
HCT VFR BLD AUTO: 39.4 % (ref 42–52)
HGB BLD-MCNC: 12.2 G/DL (ref 14–18)
IMM GRANULOCYTES # BLD: 0.1 K/UL
LIPASE SERPL-CCNC: 123 U/L (ref 13–60)
LYMPHOCYTES # BLD: 0.7 K/UL (ref 1.1–4.5)
LYMPHOCYTES NFR BLD: 5.1 % (ref 20–40)
MCH RBC QN AUTO: 26.9 PG (ref 27–31)
MCHC RBC AUTO-ENTMCNC: 31 G/DL (ref 33–37)
MCV RBC AUTO: 87 FL (ref 80–94)
MONOCYTES # BLD: 0.4 K/UL (ref 0–0.9)
MONOCYTES NFR BLD: 3.1 % (ref 0–10)
NEUTROPHILS # BLD: 12.1 K/UL (ref 1.5–7.5)
NEUTS SEG NFR BLD: 90.8 % (ref 50–65)
PLATELET # BLD AUTO: 128 K/UL (ref 130–400)
PMV BLD AUTO: 11.9 FL (ref 9.4–12.4)
POTASSIUM SERPL-SCNC: 4.3 MMOL/L (ref 3.5–5)
PROT SERPL-MCNC: 7.2 G/DL (ref 6.6–8.7)
RBC # BLD AUTO: 4.53 M/UL (ref 4.7–6.1)
SODIUM SERPL-SCNC: 140 MMOL/L (ref 136–145)
WBC # BLD AUTO: 13.4 K/UL (ref 4.8–10.8)

## 2023-09-01 PROCEDURE — 7100000001 HC PACU RECOVERY - ADDTL 15 MIN: Performed by: SURGERY

## 2023-09-01 PROCEDURE — 80076 HEPATIC FUNCTION PANEL: CPT

## 2023-09-01 PROCEDURE — 2580000003 HC RX 258: Performed by: SPECIALIST

## 2023-09-01 PROCEDURE — 6360000002 HC RX W HCPCS: Performed by: SPECIALIST

## 2023-09-01 PROCEDURE — 6370000000 HC RX 637 (ALT 250 FOR IP): Performed by: INTERNAL MEDICINE

## 2023-09-01 PROCEDURE — 94760 N-INVAS EAR/PLS OXIMETRY 1: CPT

## 2023-09-01 PROCEDURE — 3600000004 HC SURGERY LEVEL 4 BASE: Performed by: SURGERY

## 2023-09-01 PROCEDURE — 88304 TISSUE EXAM BY PATHOLOGIST: CPT

## 2023-09-01 PROCEDURE — A4216 STERILE WATER/SALINE, 10 ML: HCPCS | Performed by: SPECIALIST

## 2023-09-01 PROCEDURE — 2580000003 HC RX 258: Performed by: ANESTHESIOLOGY

## 2023-09-01 PROCEDURE — 2580000003 HC RX 258: Performed by: INTERNAL MEDICINE

## 2023-09-01 PROCEDURE — 7100000000 HC PACU RECOVERY - FIRST 15 MIN: Performed by: SURGERY

## 2023-09-01 PROCEDURE — 2500000003 HC RX 250 WO HCPCS: Performed by: ANESTHESIOLOGY

## 2023-09-01 PROCEDURE — 80048 BASIC METABOLIC PNL TOTAL CA: CPT

## 2023-09-01 PROCEDURE — 94640 AIRWAY INHALATION TREATMENT: CPT

## 2023-09-01 PROCEDURE — 3700000000 HC ANESTHESIA ATTENDED CARE: Performed by: SURGERY

## 2023-09-01 PROCEDURE — 2700000000 HC OXYGEN THERAPY PER DAY

## 2023-09-01 PROCEDURE — 36415 COLL VENOUS BLD VENIPUNCTURE: CPT

## 2023-09-01 PROCEDURE — 6360000002 HC RX W HCPCS: Performed by: ANESTHESIOLOGY

## 2023-09-01 PROCEDURE — 2709999900 HC NON-CHARGEABLE SUPPLY: Performed by: SURGERY

## 2023-09-01 PROCEDURE — 1210000000 HC MED SURG R&B

## 2023-09-01 PROCEDURE — 85025 COMPLETE CBC W/AUTO DIFF WBC: CPT

## 2023-09-01 PROCEDURE — C9399 UNCLASSIFIED DRUGS OR BIOLOG: HCPCS | Performed by: ANESTHESIOLOGY

## 2023-09-01 PROCEDURE — 93005 ELECTROCARDIOGRAM TRACING: CPT | Performed by: ANESTHESIOLOGY

## 2023-09-01 PROCEDURE — 6370000000 HC RX 637 (ALT 250 FOR IP): Performed by: HOSPITALIST

## 2023-09-01 PROCEDURE — 3600000014 HC SURGERY LEVEL 4 ADDTL 15MIN: Performed by: SURGERY

## 2023-09-01 PROCEDURE — 6360000002 HC RX W HCPCS: Performed by: INTERNAL MEDICINE

## 2023-09-01 PROCEDURE — 0FT44ZZ RESECTION OF GALLBLADDER, PERCUTANEOUS ENDOSCOPIC APPROACH: ICD-10-PCS | Performed by: SURGERY

## 2023-09-01 PROCEDURE — 2500000003 HC RX 250 WO HCPCS: Performed by: SURGERY

## 2023-09-01 PROCEDURE — 3700000001 HC ADD 15 MINUTES (ANESTHESIA): Performed by: SURGERY

## 2023-09-01 PROCEDURE — 83690 ASSAY OF LIPASE: CPT

## 2023-09-01 PROCEDURE — C9113 INJ PANTOPRAZOLE SODIUM, VIA: HCPCS | Performed by: SPECIALIST

## 2023-09-01 RX ORDER — SODIUM CHLORIDE 0.9 % (FLUSH) 0.9 %
5-40 SYRINGE (ML) INJECTION PRN
Status: DISCONTINUED | OUTPATIENT
Start: 2023-09-01 | End: 2023-09-02 | Stop reason: HOSPADM

## 2023-09-01 RX ORDER — FAMOTIDINE 20 MG/1
20 TABLET, FILM COATED ORAL ONCE
Status: DISCONTINUED | OUTPATIENT
Start: 2023-09-01 | End: 2023-09-02 | Stop reason: HOSPADM

## 2023-09-01 RX ORDER — LABETALOL HYDROCHLORIDE 5 MG/ML
10 INJECTION, SOLUTION INTRAVENOUS
Status: DISCONTINUED | OUTPATIENT
Start: 2023-09-01 | End: 2023-09-01 | Stop reason: HOSPADM

## 2023-09-01 RX ORDER — METOCLOPRAMIDE HYDROCHLORIDE 5 MG/ML
10 INJECTION INTRAMUSCULAR; INTRAVENOUS
Status: DISCONTINUED | OUTPATIENT
Start: 2023-09-01 | End: 2023-09-01 | Stop reason: HOSPADM

## 2023-09-01 RX ORDER — EPHEDRINE SULFATE 50 MG/ML
INJECTION, SOLUTION INTRAVENOUS PRN
Status: DISCONTINUED | OUTPATIENT
Start: 2023-09-01 | End: 2023-09-01 | Stop reason: SDUPTHER

## 2023-09-01 RX ORDER — IPRATROPIUM BROMIDE AND ALBUTEROL SULFATE 2.5; .5 MG/3ML; MG/3ML
1 SOLUTION RESPIRATORY (INHALATION)
Status: DISCONTINUED | OUTPATIENT
Start: 2023-09-01 | End: 2023-09-01 | Stop reason: HOSPADM

## 2023-09-01 RX ORDER — PROPOFOL 10 MG/ML
INJECTION, EMULSION INTRAVENOUS PRN
Status: DISCONTINUED | OUTPATIENT
Start: 2023-09-01 | End: 2023-09-01 | Stop reason: SDUPTHER

## 2023-09-01 RX ORDER — DIPHENHYDRAMINE HYDROCHLORIDE 50 MG/ML
12.5 INJECTION INTRAMUSCULAR; INTRAVENOUS
Status: DISCONTINUED | OUTPATIENT
Start: 2023-09-01 | End: 2023-09-01 | Stop reason: HOSPADM

## 2023-09-01 RX ORDER — LORAZEPAM 2 MG/ML
0.5 INJECTION INTRAMUSCULAR
Status: DISCONTINUED | OUTPATIENT
Start: 2023-09-01 | End: 2023-09-01 | Stop reason: HOSPADM

## 2023-09-01 RX ORDER — SODIUM CHLORIDE 0.9 % (FLUSH) 0.9 %
5-40 SYRINGE (ML) INJECTION EVERY 12 HOURS SCHEDULED
Status: DISCONTINUED | OUTPATIENT
Start: 2023-09-01 | End: 2023-09-02 | Stop reason: HOSPADM

## 2023-09-01 RX ORDER — MIDAZOLAM HYDROCHLORIDE 1 MG/ML
INJECTION INTRAMUSCULAR; INTRAVENOUS PRN
Status: DISCONTINUED | OUTPATIENT
Start: 2023-09-01 | End: 2023-09-01 | Stop reason: SDUPTHER

## 2023-09-01 RX ORDER — BUPIVACAINE HYDROCHLORIDE AND EPINEPHRINE 2.5; 5 MG/ML; UG/ML
INJECTION, SOLUTION INFILTRATION; PERINEURAL PRN
Status: DISCONTINUED | OUTPATIENT
Start: 2023-09-01 | End: 2023-09-01 | Stop reason: ALTCHOICE

## 2023-09-01 RX ORDER — DROPERIDOL 2.5 MG/ML
0.62 INJECTION, SOLUTION INTRAMUSCULAR; INTRAVENOUS
Status: DISCONTINUED | OUTPATIENT
Start: 2023-09-01 | End: 2023-09-01 | Stop reason: HOSPADM

## 2023-09-01 RX ORDER — SODIUM CHLORIDE 0.9 % (FLUSH) 0.9 %
5-40 SYRINGE (ML) INJECTION EVERY 12 HOURS SCHEDULED
Status: DISCONTINUED | OUTPATIENT
Start: 2023-09-01 | End: 2023-09-01 | Stop reason: HOSPADM

## 2023-09-01 RX ORDER — MORPHINE SULFATE 2 MG/ML
2 INJECTION, SOLUTION INTRAMUSCULAR; INTRAVENOUS EVERY 5 MIN PRN
Status: DISCONTINUED | OUTPATIENT
Start: 2023-09-01 | End: 2023-09-01 | Stop reason: HOSPADM

## 2023-09-01 RX ORDER — MIDAZOLAM HYDROCHLORIDE 2 MG/2ML
2 INJECTION, SOLUTION INTRAMUSCULAR; INTRAVENOUS
Status: ACTIVE | OUTPATIENT
Start: 2023-09-01 | End: 2023-09-02

## 2023-09-01 RX ORDER — SODIUM CHLORIDE, SODIUM LACTATE, POTASSIUM CHLORIDE, CALCIUM CHLORIDE 600; 310; 30; 20 MG/100ML; MG/100ML; MG/100ML; MG/100ML
INJECTION, SOLUTION INTRAVENOUS CONTINUOUS PRN
Status: DISCONTINUED | OUTPATIENT
Start: 2023-09-01 | End: 2023-09-01 | Stop reason: SDUPTHER

## 2023-09-01 RX ORDER — GLYCOPYRROLATE 0.2 MG/ML
INJECTION INTRAMUSCULAR; INTRAVENOUS PRN
Status: DISCONTINUED | OUTPATIENT
Start: 2023-09-01 | End: 2023-09-01 | Stop reason: SDUPTHER

## 2023-09-01 RX ORDER — MEPERIDINE HYDROCHLORIDE 25 MG/ML
12.5 INJECTION INTRAMUSCULAR; INTRAVENOUS; SUBCUTANEOUS EVERY 5 MIN PRN
Status: DISCONTINUED | OUTPATIENT
Start: 2023-09-01 | End: 2023-09-01 | Stop reason: HOSPADM

## 2023-09-01 RX ORDER — LIDOCAINE HYDROCHLORIDE 10 MG/ML
INJECTION, SOLUTION EPIDURAL; INFILTRATION; INTRACAUDAL; PERINEURAL PRN
Status: DISCONTINUED | OUTPATIENT
Start: 2023-09-01 | End: 2023-09-01 | Stop reason: SDUPTHER

## 2023-09-01 RX ORDER — DEXAMETHASONE SODIUM PHOSPHATE 10 MG/ML
INJECTION, SOLUTION INTRAMUSCULAR; INTRAVENOUS PRN
Status: DISCONTINUED | OUTPATIENT
Start: 2023-09-01 | End: 2023-09-01 | Stop reason: SDUPTHER

## 2023-09-01 RX ORDER — LIDOCAINE HYDROCHLORIDE 10 MG/ML
1 INJECTION, SOLUTION EPIDURAL; INFILTRATION; INTRACAUDAL; PERINEURAL
Status: ACTIVE | OUTPATIENT
Start: 2023-09-01 | End: 2023-09-02

## 2023-09-01 RX ORDER — SODIUM CHLORIDE 9 MG/ML
INJECTION, SOLUTION INTRAVENOUS PRN
Status: DISCONTINUED | OUTPATIENT
Start: 2023-09-01 | End: 2023-09-01 | Stop reason: HOSPADM

## 2023-09-01 RX ORDER — SODIUM CHLORIDE 9 MG/ML
INJECTION, SOLUTION INTRAVENOUS PRN
Status: DISCONTINUED | OUTPATIENT
Start: 2023-09-01 | End: 2023-09-02 | Stop reason: HOSPADM

## 2023-09-01 RX ORDER — SCOLOPAMINE TRANSDERMAL SYSTEM 1 MG/1
1 PATCH, EXTENDED RELEASE TRANSDERMAL
Status: DISCONTINUED | OUTPATIENT
Start: 2023-09-01 | End: 2023-09-02 | Stop reason: HOSPADM

## 2023-09-01 RX ORDER — SODIUM CHLORIDE 0.9 % (FLUSH) 0.9 %
5-40 SYRINGE (ML) INJECTION PRN
Status: DISCONTINUED | OUTPATIENT
Start: 2023-09-01 | End: 2023-09-01 | Stop reason: HOSPADM

## 2023-09-01 RX ORDER — FENTANYL CITRATE 50 UG/ML
INJECTION, SOLUTION INTRAMUSCULAR; INTRAVENOUS PRN
Status: DISCONTINUED | OUTPATIENT
Start: 2023-09-01 | End: 2023-09-01 | Stop reason: SDUPTHER

## 2023-09-01 RX ORDER — MORPHINE SULFATE 4 MG/ML
4 INJECTION, SOLUTION INTRAMUSCULAR; INTRAVENOUS EVERY 5 MIN PRN
Status: DISCONTINUED | OUTPATIENT
Start: 2023-09-01 | End: 2023-09-01 | Stop reason: HOSPADM

## 2023-09-01 RX ORDER — ROCURONIUM BROMIDE 10 MG/ML
INJECTION, SOLUTION INTRAVENOUS PRN
Status: DISCONTINUED | OUTPATIENT
Start: 2023-09-01 | End: 2023-09-01 | Stop reason: SDUPTHER

## 2023-09-01 RX ORDER — ONDANSETRON 2 MG/ML
INJECTION INTRAMUSCULAR; INTRAVENOUS PRN
Status: DISCONTINUED | OUTPATIENT
Start: 2023-09-01 | End: 2023-09-01 | Stop reason: SDUPTHER

## 2023-09-01 RX ADMIN — MIDAZOLAM 1 MG: 1 INJECTION INTRAMUSCULAR; INTRAVENOUS at 10:26

## 2023-09-01 RX ADMIN — EPHEDRINE SULFATE 10 MG: 50 INJECTION INTRAMUSCULAR; INTRAVENOUS; SUBCUTANEOUS at 11:34

## 2023-09-01 RX ADMIN — SODIUM CHLORIDE, SODIUM LACTATE, POTASSIUM CHLORIDE, AND CALCIUM CHLORIDE: 600; 310; 30; 20 INJECTION, SOLUTION INTRAVENOUS at 10:33

## 2023-09-01 RX ADMIN — EPHEDRINE SULFATE 10 MG: 50 INJECTION INTRAMUSCULAR; INTRAVENOUS; SUBCUTANEOUS at 10:38

## 2023-09-01 RX ADMIN — FENTANYL CITRATE 25 MCG: 0.05 INJECTION, SOLUTION INTRAMUSCULAR; INTRAVENOUS at 11:29

## 2023-09-01 RX ADMIN — PROPOFOL 80 MG: 10 INJECTION, EMULSION INTRAVENOUS at 10:33

## 2023-09-01 RX ADMIN — GABAPENTIN 300 MG: 300 CAPSULE ORAL at 07:34

## 2023-09-01 RX ADMIN — FENTANYL CITRATE 50 MCG: 0.05 INJECTION, SOLUTION INTRAMUSCULAR; INTRAVENOUS at 10:26

## 2023-09-01 RX ADMIN — ROCURONIUM BROMIDE 50 MG: 10 INJECTION, SOLUTION INTRAVENOUS at 10:33

## 2023-09-01 RX ADMIN — ONDANSETRON 4 MG: 2 INJECTION INTRAMUSCULAR; INTRAVENOUS at 12:20

## 2023-09-01 RX ADMIN — FENTANYL CITRATE 25 MCG: 0.05 INJECTION, SOLUTION INTRAMUSCULAR; INTRAVENOUS at 11:46

## 2023-09-01 RX ADMIN — SUGAMMADEX 200 MG: 100 INJECTION, SOLUTION INTRAVENOUS at 12:26

## 2023-09-01 RX ADMIN — SODIUM CHLORIDE, PRESERVATIVE FREE 40 MG: 5 INJECTION INTRAVENOUS at 07:38

## 2023-09-01 RX ADMIN — PIPERACILLIN AND TAZOBACTAM 3375 MG: 3; .375 INJECTION, POWDER, LYOPHILIZED, FOR SOLUTION INTRAVENOUS at 06:04

## 2023-09-01 RX ADMIN — GABAPENTIN 300 MG: 300 CAPSULE ORAL at 20:26

## 2023-09-01 RX ADMIN — GLYCOPYRROLATE 0.2 MG: 0.2 INJECTION, SOLUTION INTRAMUSCULAR; INTRAVENOUS at 11:34

## 2023-09-01 RX ADMIN — MORPHINE SULFATE 4 MG: 4 INJECTION, SOLUTION INTRAMUSCULAR; INTRAVENOUS at 13:22

## 2023-09-01 RX ADMIN — PIPERACILLIN AND TAZOBACTAM 3375 MG: 3; .375 INJECTION, POWDER, LYOPHILIZED, FOR SOLUTION INTRAVENOUS at 20:26

## 2023-09-01 RX ADMIN — TAMSULOSIN HYDROCHLORIDE 0.4 MG: 0.4 CAPSULE ORAL at 07:34

## 2023-09-01 RX ADMIN — ROCURONIUM BROMIDE 10 MG: 10 INJECTION, SOLUTION INTRAVENOUS at 11:56

## 2023-09-01 RX ADMIN — FUROSEMIDE 40 MG: 40 TABLET ORAL at 07:34

## 2023-09-01 RX ADMIN — DEXAMETHASONE SODIUM PHOSPHATE 10 MG: 10 INJECTION, SOLUTION INTRAMUSCULAR; INTRAVENOUS at 10:45

## 2023-09-01 RX ADMIN — ENOXAPARIN SODIUM 80 MG: 100 INJECTION SUBCUTANEOUS at 20:26

## 2023-09-01 RX ADMIN — LIDOCAINE HYDROCHLORIDE 50 MG: 10 INJECTION, SOLUTION EPIDURAL; INFILTRATION; INTRACAUDAL; PERINEURAL at 10:33

## 2023-09-01 RX ADMIN — FENTANYL CITRATE 25 MCG: 0.05 INJECTION, SOLUTION INTRAMUSCULAR; INTRAVENOUS at 11:10

## 2023-09-01 RX ADMIN — PHENYLEPHRINE HYDROCHLORIDE 200 MCG: 10 INJECTION INTRAVENOUS at 11:05

## 2023-09-01 RX ADMIN — FENTANYL CITRATE 25 MCG: 0.05 INJECTION, SOLUTION INTRAMUSCULAR; INTRAVENOUS at 11:14

## 2023-09-01 RX ADMIN — BUDESONIDE AND FORMOTEROL FUMARATE DIHYDRATE 2 PUFF: 160; 4.5 AEROSOL RESPIRATORY (INHALATION) at 18:35

## 2023-09-01 RX ADMIN — Medication 20 MG: at 20:26

## 2023-09-01 RX ADMIN — EPHEDRINE SULFATE 10 MG: 50 INJECTION INTRAMUSCULAR; INTRAVENOUS; SUBCUTANEOUS at 10:53

## 2023-09-01 RX ADMIN — CARVEDILOL 3.12 MG: 3.12 TABLET, FILM COATED ORAL at 17:39

## 2023-09-01 RX ADMIN — ZOLPIDEM TARTRATE 10 MG: 5 TABLET ORAL at 20:27

## 2023-09-01 RX ADMIN — EPHEDRINE SULFATE 10 MG: 50 INJECTION INTRAMUSCULAR; INTRAVENOUS; SUBCUTANEOUS at 11:05

## 2023-09-01 RX ADMIN — BUDESONIDE AND FORMOTEROL FUMARATE DIHYDRATE 2 PUFF: 160; 4.5 AEROSOL RESPIRATORY (INHALATION) at 07:06

## 2023-09-01 ASSESSMENT — LIFESTYLE VARIABLES: SMOKING_STATUS: 0

## 2023-09-01 ASSESSMENT — PAIN DESCRIPTION - DESCRIPTORS: DESCRIPTORS: DULL;NAGGING

## 2023-09-01 ASSESSMENT — PAIN DESCRIPTION - LOCATION: LOCATION: ABDOMEN

## 2023-09-01 ASSESSMENT — PAIN SCALES - GENERAL: PAINLEVEL_OUTOF10: 8

## 2023-09-01 ASSESSMENT — ENCOUNTER SYMPTOMS: SHORTNESS OF BREATH: 0

## 2023-09-01 ASSESSMENT — PAIN DESCRIPTION - ORIENTATION: ORIENTATION: ANTERIOR

## 2023-09-01 NOTE — PROGRESS NOTES
General: No scleral icterus. Right eye: No discharge. Left eye: No discharge. Extraocular Movements: Extraocular movements intact. Conjunctiva/sclera: Conjunctivae normal.      Pupils: Pupils are equal, round, and reactive to light. Cardiovascular:      Rate and Rhythm: Normal rate and regular rhythm. Pulses: Normal pulses. Heart sounds: Normal heart sounds. No murmur heard. No friction rub. No gallop. Pulmonary:      Effort: Pulmonary effort is normal. No respiratory distress. Breath sounds: Normal breath sounds. No stridor. No wheezing, rhonchi or rales. Chest:      Chest wall: No tenderness. Abdominal:      General: Bowel sounds are normal. There is no distension. Palpations: Abdomen is soft. Tenderness: There is no abdominal tenderness. There is no guarding or rebound. Musculoskeletal:         General: No swelling, tenderness, deformity or signs of injury. Normal range of motion. Cervical back: Normal range of motion and neck supple. No rigidity. No muscular tenderness. Right lower leg: No edema. Left lower leg: No edema. Skin:     General: Skin is warm and dry. Capillary Refill: Capillary refill takes less than 2 seconds. Coloration: Skin is not jaundiced or pale. Findings: No bruising, erythema, lesion or rash. Neurological:      General: No focal deficit present. Mental Status: He is alert and oriented to person, place, and time. Cranial Nerves: No cranial nerve deficit. Sensory: No sensory deficit. Motor: No weakness. Coordination: Coordination normal.   Psychiatric:         Mood and Affect: Mood normal.         Behavior: Behavior normal.         Thought Content:  Thought content normal.         Judgment: Judgment normal.         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/28/2021    GERD (gastroesophageal reflux disease), as per medication reconciliation     Black lung ([address])     History of tobacco abuse     Ischemia of left upper extremity 08/27/2021       Hospital Problems             Last Modified POA    * (Principal) Acute gallstone pancreatitis 8/29/2023 Yes    Ischemia of left upper extremity 8/30/2023 Yes    GERD (gastroesophageal reflux disease), as per medication reconciliation 8/30/2023 Yes    Arterial embolism and thrombosis of upper extremity ([address]) 8/30/2023 Yes    Choledocholithiasis 8/29/2023 Unknown    Overview Signed 8/29/2023  8:44 AM by Romain Chavez MD     Added automatically from request for surgery [id number]         Severe malnutrition ([address]) (Chronic) 8/31/2023 Yes   Principal Problem:    Acute gallstone pancreatitis  Active Problems:    Ischemia of left upper extremity    GERD (gastroesophageal reflux disease), as per medication reconciliation    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 *      Brief History/Summary:   Mr Christine Amezquita presents to [facility] with severe LUQ abd pain, with associated with nausea and vomiting. Gallbladder ultrasound was found to have a likely sludge ball and gallstone with normal common bile duct size. Biliary  Pancreatitis   Choledocholithiasis  CT abdomen pelvis (08/29/2023): Impression:  Acute pancreatitis. Choledocholithiasis and dilated common bile duct.  Cholelithiasis   Lipase: > 3,000 --> > 3,000 --> 653 --> 123 (09/01/2023)  Gentle IV fluids, given patient becoming fluid overload  Continue symptomatic supportive management  GI consult admission  ERCP - 08/30/2023  Gen Surgery

## 2023-09-01 NOTE — PROGRESS NOTES
Discharged related information:    Patient may be observed for period of 8 to 12 hours postoperatively. I suspect he will be ready for discharge later this evening or tomorrow morning. He will require a postdischarge follow-up appointment with the general surgery clinic here in 2 weeks time. He should not require any narcotic analgesics for postoperative pain control for the purpose of his postcholecystectomy status. If needed, 5 to 6 pills of an oral opioid containing medications may be prescribed. The postoperative course was discussed with the wife at the bedside. She was educated on postoperative signs and symptoms suggesting the need for follow-up sooner. Acetaminophen was encouraged for postoperative pain control. She expressed an understanding of the plan of care and and appreciation for what we have all done for her . Understands along with the  that I am a locum surgeon and they will not likely be seeing me in follow-up here. They will be seen in the local general surgery clinic for their first postop follow-up appointment. That time, they may review the postoperative course as well as the pathology report with the provider in the clinic.

## 2023-09-01 NOTE — ANESTHESIA PRE PROCEDURE
edentulous      Pulmonary:Negative Pulmonary ROS and normal exam    (+) decreased breath sounds: bilateral     (-) shortness of breath and not a current smoker          Patient did not smoke on day of surgery. ROS comment: Black lung on 3L 02 at home   Cardiovascular:    (+) hypertension:, dysrhythmias: atrial fibrillation,     (-) pacemaker, CAD, CABG/stent,  angina and  CHF    NYHA Classification: I    Rhythm: regular  Rate: normal           Beta Blocker:  Dose within 24 Hrs         Neuro/Psych:   Negative Neuro/Psych ROS     (-) seizures, CVA and depression/anxiety            GI/Hepatic/Renal: Neg GI/Hepatic/Renal ROS  (+) GERD:, renal disease: CRI,      (-) hiatal hernia       Endo/Other: Negative Endo/Other ROS   (+) blood dyscrasia: anemia and thrombocytopenia:., .    (-) diabetes mellitus, hypothyroidism        Pt had PAT visit. Abdominal:       Abdomen: soft. Vascular: Other Findings:           Anesthesia Plan      general     ASA 3     (Iv zofran within 30 min of closing )  Induction: intravenous. BIS  MIPS: Postoperative opioids intended and Prophylactic antiemetics administered. Anesthetic plan and risks discussed with patient. Use of blood products discussed with patient whom consented to blood products.      Attending anesthesiologist reviewed and agrees with Pre Eval content                Ioana Meza MD   9/1/2023

## 2023-09-01 NOTE — ANESTHESIA POSTPROCEDURE EVALUATION
Department of Anesthesiology  Postprocedure Note    Patient: Catie Fairchild  MRN: [id number]  YOB: 1955  Date of evaluation: 9/1/2023      Procedure Summary     Date: 09/01/23 Room / Location: [address]    Anesthesia Start: 1026 Anesthesia Stop: 5588    Procedure: CHOLECYSTECTOMY LAPAROSCOPIC Diagnosis:       Gall stone pancreatitis      (Gall stone pancreatitis [K85.10])    Surgeons: Kemi Brumfield MD Responsible Provider: Tobin Mendoza MD    Anesthesia Type: general ASA Status: 3          Anesthesia Type: No value filed.     Sang Phase I: Sang Score: 9    Sang Phase II:        Anesthesia Post Evaluation    Patient location during evaluation: PACU  Patient participation: complete - patient participated  Level of consciousness: awake and alert  Pain score: 0  Airway patency: patent  Nausea & Vomiting: no nausea and no vomiting  Complications: no  Cardiovascular status: hemodynamically stable  Respiratory status: acceptable, spontaneous ventilation and nasal cannula  Hydration status: euvolemic  Pain management: adequate

## 2023-09-01 NOTE — OP NOTE
Operative Note      Patient: Sarah Redding  YOB: 1955  MRN: [id number]    Date of Procedure: 9/1/2023    Pre-Op Diagnosis Codes:     * Gall stone pancreatitis [K85.10]    Post-Op Diagnosis: Same       Procedure(s):  CHOLECYSTECTOMY LAPAROSCOPIC    Surgeon(s): Coy Crow MD    Assistant:   * No surgical staff found *    Anesthesia: General    Estimated Blood Loss (mL): Minimal    Complications: None    Specimens:   ID Type Source Tests Collected by Time Destination   A : Gallbladder Tissue Gallbladder SURGICAL PATHOLOGY Coy Crow MD 9/1/2023 1106        Implants:  * No implants in log *      Drains:   NG/OG/NJ/NE Tube Orogastric 18 fr Center mouth (Active)       Findings: Large gallbladder; bile filled; subacute cholecystitis with inflammatory stranding and Calot's triangle around cystic structures. Significant adipose tissue covering of the gallbladder and in the gallbladder mesentery. Visceral abdominal obesity. Detailed Description of Procedure: In the operating room, the patient was appropriately positioned, anesthetized, prepped and draped. A timeout was performed after general anesthesia was administered by the CRNA. Consensus was obtained. Prophylactic IV antibiotics were administered. SCDs were applied for DVT prophylaxis. Access to the abdominal cavity was gained via a 5 mm infraumbilical incision using a #11 blade and a Veress needle was inserted. The carbon oxide pneumoperitoneum was raised to 15 mmHg pressure. A 0 degree 5 mm laparoscope was loaded onto an Optiview trocar and inserted into the abdominal cavity. Under direct vision a 12 mm port was created in the subxiphoid position and 2 other 5 mm ports were placed in the right upper quadrant. The gallbladder was distended and covered with a significant amount of subserosal fat obscuring the anatomy. Exposure was somewhat challenging due to the visceral obesity.   It was grasped at its fundus and retracted

## 2023-09-01 NOTE — BRIEF OP NOTE
Brief Postoperative Note      Patient: Bebo Jimenez  YOB: 1955  MRN: [id number]    Date of Procedure: 9/1/2023    Pre-Op Diagnosis Codes:     * Gall stone pancreatitis [K85.10]    Post-Op Diagnosis: Same       Procedure(s):  CHOLECYSTECTOMY LAPAROSCOPIC    Surgeon(s): Constanza Umanzor MD    Assistant:  * No surgical staff found *    Anesthesia: General    Estimated Blood Loss (mL): Minimal    Complications: None    Specimens:   ID Type Source Tests Collected by Time Destination   A : Gallbladder Tissue Gallbladder SURGICAL PATHOLOGY Constanza Umanzor MD 9/1/2023 1106        Implants:  * No implants in log *      Drains:   NG/OG/NJ/NE Tube Orogastric 18 fr Center mouth (Active)       Findings: Large gallbladder; bile filled; subacute cholecystitis with inflammatory stranding and Calot's triangle around cystic structures. Significant adipose tissue covering of the gallbladder and in the gallbladder mesentery. Visceral abdominal obesity.       Electronically signed by Constanza Umanzor MD on 9/1/2023 at 12:53 PM

## 2023-09-02 VITALS
SYSTOLIC BLOOD PRESSURE: 130 MMHG | DIASTOLIC BLOOD PRESSURE: 72 MMHG | HEIGHT: 68 IN | TEMPERATURE: 96.8 F | OXYGEN SATURATION: 99 % | WEIGHT: 187 LBS | HEART RATE: 75 BPM | BODY MASS INDEX: 28.34 KG/M2 | RESPIRATION RATE: 16 BRPM

## 2023-09-02 LAB
EKG P AXIS: 48 DEGREES
EKG P-R INTERVAL: 142 MS
EKG Q-T INTERVAL: 444 MS
EKG QRS DURATION: 100 MS
EKG QTC CALCULATION (BAZETT): 434 MS
EKG T AXIS: 77 DEGREES
LV EF: 50 %
LVEF MODALITY: NORMAL

## 2023-09-02 PROCEDURE — 6370000000 HC RX 637 (ALT 250 FOR IP): Performed by: INTERNAL MEDICINE

## 2023-09-02 PROCEDURE — 94760 N-INVAS EAR/PLS OXIMETRY 1: CPT

## 2023-09-02 PROCEDURE — 6360000002 HC RX W HCPCS: Performed by: INTERNAL MEDICINE

## 2023-09-02 PROCEDURE — C9113 INJ PANTOPRAZOLE SODIUM, VIA: HCPCS | Performed by: INTERNAL MEDICINE

## 2023-09-02 PROCEDURE — 94640 AIRWAY INHALATION TREATMENT: CPT

## 2023-09-02 PROCEDURE — 2580000003 HC RX 258: Performed by: INTERNAL MEDICINE

## 2023-09-02 PROCEDURE — 93306 TTE W/DOPPLER COMPLETE: CPT

## 2023-09-02 RX ORDER — AMOXICILLIN AND CLAVULANATE POTASSIUM 875; 125 MG/1; MG/1
1 TABLET, FILM COATED ORAL 2 TIMES DAILY
Qty: 6 TABLET | Refills: 0 | Status: SHIPPED | OUTPATIENT
Start: 2023-09-02 | End: 2023-09-05

## 2023-09-02 RX ADMIN — SODIUM CHLORIDE, PRESERVATIVE FREE 40 MG: 5 INJECTION INTRAVENOUS at 08:30

## 2023-09-02 RX ADMIN — ENOXAPARIN SODIUM 80 MG: 100 INJECTION SUBCUTANEOUS at 08:31

## 2023-09-02 RX ADMIN — BUDESONIDE AND FORMOTEROL FUMARATE DIHYDRATE 2 PUFF: 160; 4.5 AEROSOL RESPIRATORY (INHALATION) at 06:35

## 2023-09-02 RX ADMIN — TAMSULOSIN HYDROCHLORIDE 0.4 MG: 0.4 CAPSULE ORAL at 08:30

## 2023-09-02 RX ADMIN — PIPERACILLIN AND TAZOBACTAM 3375 MG: 3; .375 INJECTION, POWDER, LYOPHILIZED, FOR SOLUTION INTRAVENOUS at 06:23

## 2023-09-02 RX ADMIN — CARVEDILOL 3.12 MG: 3.12 TABLET, FILM COATED ORAL at 08:31

## 2023-09-02 RX ADMIN — GABAPENTIN 300 MG: 300 CAPSULE ORAL at 13:47

## 2023-09-02 RX ADMIN — GABAPENTIN 300 MG: 300 CAPSULE ORAL at 08:30

## 2023-09-02 NOTE — DISCHARGE INSTR - DIET
Good nutrition is important when healing from an illness, injury, or surgery. Follow any nutrition recommendations given to you during your hospital stay. If you were given an oral nutrition supplement while in the hospital, continue to take this supplement at home. You can take it with meals, in-between meals, and/or before bedtime. These supplements can be purchased at most local grocery stores, pharmacies, and chain [employer]. If you have any questions about your diet or nutrition, call the hospital and ask for the dietitian.   Soft and Bite Sized

## 2023-09-02 NOTE — DISCHARGE SUMMARY
[facility], [address] [place] [address]  DEPARTMENT OF HOSPITALIST MEDICINE    DISCHARGE SUMMARY:        Cynthia Max  :    MRN:  [id number]    Admit date:  2023  Discharge date:  2023      Admitting Physician:  Anh Nuñez MD  Advance Directive: Full Code    Consults Made:   IP CONSULT TO GI  IP CONSULT TO GENERAL SURGERY      Primary Care Physician:  Maycol Cerrato, APRN - CNP    Discharge Diagnoses:  Principal Problem:    Acute gallstone pancreatitis  Active Problems:    Ischemia of left upper extremity    GERD (gastroesophageal reflux disease), as per medication reconciliation    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 *          Pertinent Labs:  CBC with DIFF:  Recent Labs     23  1140 23  0740   WBC 12.7* 13.4*   RBC 4.12* 4.53*   HGB 11.2* 12.2*   HCT 36.4* 39.4*   MCV 88.3 87.0   MCH 27.2 26.9*   MCHC 30.8* 31.0*   RDW 19.4* 19.7*   * 128*   MPV 12.4 11.9   NEUTOPHILPCT  --  90.8*   LYMPHOPCT  --  5.1*   MONOPCT  --  3.1   EOSRELPCT  --  0.4   BASOPCT  --  0.1   NEUTROABS  --  12.1*   LYMPHSABS  --  0.7*   MONOSABS  --  0.40   EOSABS  --  0.10   BASOSABS  --  0.00       CMP/BMP:  Recent Labs     23  1140 23  0740    140   K 4.5 4.3    101   CO2 26 30*   ANIONGAP 10 9   GLUCOSE 143* 82   BUN 36* 27*   CREATININE 1.7* 1.2   LABGLOM 44* >60   CALCIUM 9.2 9.6   PROT 6.8 7.2   LABALBU 3.0* 3.2*   BILITOT 1.3* 1.0   ALKPHOS 327* 310*   * 167*   * 66*         CRP:  No results for input(s): CRP in the last 72 hours. Sed Rate:  No results for input(s): SEDRATE in the last 72 hours.       HgBA1c:  No components found for: HGBA1C  FLP:  No results found for: TRIG, HDL, LDLCALC, LDLDIRECT, LABVLDL  TSH:  No results found for: TSH  Troponin T:   Recent Labs     23  1140   TROPONINI 0.02     Pro-BNP:

## 2023-09-02 NOTE — DISCHARGE INSTRUCTIONS
Follow up with SANKET Moore CNP in 7 days. Call Dr. Akila Blackburn office to schedule a follow-up in 4 weeks for stent removal.   Take medications as directed. Resume activity as tolerated. Soft and bite sized diet.

## 2023-09-02 NOTE — PROGRESS NOTES
[facility]    Patient:  Madiha Harris  YOB: 1955  Date of Service: 9/2/2023  MRN: [id number]   Acct: [de-identified]   Primary Care Physician: SANKET Lorenzana CNP  Advance Directive: Full Code  Admit Date: 8/28/2023       Hospital Day: 4        CHIEF COMPLAINT:     Chief Complaint   Patient presents with    Nausea & Vomiting    Abdominal Pain     Patient reports he has known gallbladder issues and was discharged from [facility]         9/2/2023 9:50 AM  Subjective / Interval History:   09/02/2023  Patient seen and examined. Doing well. No new complaints. No acute changes or acute overnight event reported. Lying comfortably in bed in no apparent acute distress. Family at bedside. 09/01/2023  No acute changes or acute overnight event reported. Patient seen and examined. Doing well. No new complaints. Laying comfortably in bed in no acute distress. Shortness of breath improved. Patient denies any acute complaints or distress at this time. 08/31/2023  No acute changes or acute overnight event reported. No new complaints. Previously reported shortness of breath improved. Lying comfortably in bed in no apparent acute distress. Patient denies any acute complaints or distress. 08/30/2023  Patient seen and examined. Doing well. No new complaints. No acute changes or acute overnight event reported. Patient endorses some shortness of breath. Otherwise denies any other acute complaints or distress at this time. Review of Systems:   Review of Systems  ROS: 14 point review of systems is negative except as specifically addressed above. ADULT DIET;  Clear Liquid  ADULT ORAL NUTRITION SUPPLEMENT; Breakfast, Lunch, Dinner; Clear Liquid Oral Supplement    Intake/Output Summary (Last 24 hours) at 9/2/2023 0950  Last data filed at 9/2/2023 0447  Gross per 24 hour   Intake 600 ml   Output 2700 ml   Net -2100 ml Abdominal:      General: Bowel sounds are normal. There is no distension. Palpations: Abdomen is soft. Tenderness: There is no abdominal tenderness. There is no guarding or rebound. Musculoskeletal:         General: No swelling, tenderness, deformity or signs of injury. Normal range of motion. Cervical back: Normal range of motion and neck supple. No rigidity. No muscular tenderness. Right lower leg: No edema. Left lower leg: No edema. Skin:     General: Skin is warm and dry. Capillary Refill: Capillary refill takes less than 2 seconds. Coloration: Skin is not jaundiced or pale. Findings: No bruising, erythema, lesion or rash. Neurological:      General: No focal deficit present. Mental Status: He is alert and oriented to person, place, and time. Cranial Nerves: No cranial nerve deficit. Sensory: No sensory deficit. Motor: No weakness. Coordination: Coordination normal.   Psychiatric:         Mood and Affect: Mood normal.         Behavior: Behavior normal.         Thought Content:  Thought content normal.         Judgment: Judgment normal.         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

## 2023-09-02 NOTE — PROGRESS NOTES
Comprehensive Nutrition Assessment    Type and Reason for Visit:  Reassess    Nutrition Recommendations/Plan:   Start ONS     Malnutrition Assessment:  Malnutrition Status:  Severe malnutrition (09/02/23 0848)    Context:  Chronic Illness     Findings of the 6 clinical characteristics of malnutrition:  Energy Intake:  75% or less estimated energy requirements for 1 month or longer  Weight Loss:  Greater than 10% over 6 months     Body Fat Loss:  Mild body fat loss Triceps   Muscle Mass Loss:  Mild muscle mass loss Temples (temporalis), Hand (interosseous)  Fluid Accumulation:  Mild Extremities   Strength:  Not Performed    Nutrition Assessment:    Diet advanced back to clear liquids after surgery. PO intake last nigh decreased d/t surgery--intake 0 to 25%. Aware of possible discharge. New wt NA. Will start OONS to help with increased calorie and protein needs    Nutrition Related Findings:    NC. Wound Type: Surgical Incision, Skin Tears       Current Nutrition Intake & Therapies:    Average Meal Intake: 1-25%  Average Supplements Intake: None Ordered  ADULT DIET; Clear Liquid    Anthropometric Measures:  Height: 5' 7.5\" (171.5 cm)  Ideal Body Weight (IBW): 151 lbs (69 kg)    Admission Body Weight: 187 lb (84.8 kg)  Current Body Weight: 187 lb (84.8 kg),   IBW. Current BMI (kg/m2): 28.8  Usual Body Weight:  (14% wt loss in 6 months.)  % Weight Change (Calculated): -7  BMI Categories: Overweight (BMI 25.0-29. 9)    Estimated Daily Nutrient Needs:  Energy Requirements Based On: Kcal/kg  Weight Used for Energy Requirements: Admission  Energy (kcal/day): 6213-1247  Weight Used for Protein Requirements: Ideal  Protein (g/day): 90  Method Used for Fluid Requirements: 1 ml/kcal  Fluid (ml/day): 1983-4289    Nutrition Diagnosis:   Severe malnutrition, In context of chronic illness related to catabolic illness, inadequate protein-energy intake as evidenced by Criteria as identified in malnutrition

## 2023-09-02 NOTE — PLAN OF CARE
Problem: Nutrition Deficit:  Goal: Optimize nutritional status  9/2/2023 0849 by Kaylan Haines, MS, RD, LD  Outcome: Progressing  Flowsheets (Taken 9/2/2023 0845)  Nutrient intake appropriate for improving, restoring, or maintaining nutritional needs:   Assess nutritional status and recommend course of action   Monitor oral intake, labs, and treatment plans   Recommend appropriate diets, oral nutritional supplements, and vitamin/mineral supplements  9/1/2023 4079 by Harjinder Cartwright RN  Outcome: Progressing

## 2023-09-03 LAB — BACTERIA BLD CULT: NORMAL

## 2023-09-05 ENCOUNTER — TELEPHONE (OUTPATIENT)
Dept: GASTROENTEROLOGY | Age: 68
End: 2023-09-05

## 2023-09-05 NOTE — TELEPHONE ENCOUNTER
Patient is scheduled for repeat ERCP for STENT REMOVAL with Dr. Jaun Resendiz at [facility] on [address]. Patient is aware of appointment date and all instructions have been scanned into chart & mailed to patient

## 2023-09-05 NOTE — TELEPHONE ENCOUNTER
Patient spouse calling to get him scheduled for stent removal. Please return call to discuss.     Thank you

## 2023-09-12 ENCOUNTER — OFFICE VISIT (OUTPATIENT)
Dept: SURGERY | Age: 68
End: 2023-09-12

## 2023-09-12 VITALS
TEMPERATURE: 98 F | HEIGHT: 68 IN | WEIGHT: 184 LBS | OXYGEN SATURATION: 99 % | HEART RATE: 68 BPM | BODY MASS INDEX: 27.89 KG/M2

## 2023-09-12 DIAGNOSIS — Z90.49 S/P LAPAROSCOPIC CHOLECYSTECTOMY: Primary | ICD-10-CM

## 2023-09-12 PROCEDURE — 99024 POSTOP FOLLOW-UP VISIT: CPT | Performed by: PHYSICIAN ASSISTANT

## 2023-09-21 NOTE — PROGRESS NOTES
Nohemy White     :  1955      HPI:  Altaf Bright  comes today in follow-up from a laparoscopic cholecystectomy. He had ERCP and stent placement by Dr Hannah Valdes. Current Outpatient Medications   Medication Sig Dispense Refill    zolpidem (AMBIEN) 10 MG tablet Take 1 tablet by mouth nightly as needed for Sleep.      eszopiclone (LUNESTA) 1 MG TABS Take 1 tablet by mouth nightly. spironolactone (ALDACTONE) 25 MG tablet Take 1 tablet by mouth daily      tamsulosin (FLOMAX) 0.4 MG capsule Take 1 capsule by mouth daily      carvedilol (COREG) 6.25 MG tablet Take 1 tablet by mouth 2 times daily (with meals)      omeprazole (PRILOSEC) 20 MG delayed release capsule Take 1 capsule by mouth daily      furosemide (LASIX) 40 MG tablet Take 1 tablet by mouth daily      atorvastatin (LIPITOR) 80 MG tablet Take 1 tablet by mouth daily      rivaroxaban (XARELTO) 20 MG TABS tablet Take 1 tablet by mouth      Magnesium 400 MG TABS Take 400 mg by mouth in the morning and at bedtime      Multiple Vitamins-Minerals (THERAPEUTIC MULTIVITAMIN-MINERALS) tablet Take 1 tablet by mouth daily      ferrous sulfate (FE TABS 325) 325 (65 Fe) MG EC tablet Take 1 tablet by mouth 2 times daily      HYDROcodone-acetaminophen (NORCO)  MG per tablet Take 1 tablet by mouth every 6 hours as needed for Pain.      gabapentin (NEURONTIN) 300 MG capsule Take 1 capsule by mouth 3 times daily. melatonin 10 MG CAPS capsule Take 2 capsules by mouth nightly      budesonide-formoterol (SYMBICORT) 160-4.5 MCG/ACT AERO Inhale 2 puffs into the lungs 2 times daily      albuterol (ACCUNEB) 0.63 MG/3ML nebulizer solution Take 3 mLs by nebulization every 6 hours as needed for Wheezing      cetirizine (ZYRTEC) 10 MG tablet Take 1 tablet by mouth daily (Patient not taking: Reported on 2023)       No current facility-administered medications for this visit.         Allergies   Allergen Reactions    Levalbuterol     Xopenex [Levalbuterol Hcl]

## 2023-10-17 ENCOUNTER — HOSPITAL ENCOUNTER (OUTPATIENT)
Dept: CT IMAGING | Facility: HOSPITAL | Age: 68
Discharge: HOME | End: 2023-10-17
Payer: OTHER GOVERNMENT

## 2023-10-25 ENCOUNTER — TELEPHONE (OUTPATIENT)
Dept: PULMONOLOGY | Facility: CLINIC | Age: 68
End: 2023-10-25
Payer: OTHER GOVERNMENT

## 2023-10-26 ENCOUNTER — TELEPHONE (OUTPATIENT)
Dept: GASTROENTEROLOGY | Age: 68
End: 2023-10-26

## 2023-10-26 NOTE — TELEPHONE ENCOUNTER
Patient: Richard Gill    YOB: 1955      Clearance was received on October 26, 2023. for Endoscopy/ERCP scheduled for: 11/28/23    Patient may discontinue the use of Xarelto for 5  days prior to the procedure. IS Lovenox required:  No    PATIENT NOTIFIED ON:  10/26/23, info given to his wife who answered the phone.        Clearance scanned into media

## 2023-11-14 ENCOUNTER — HOSPITAL ENCOUNTER (OUTPATIENT)
Dept: CT IMAGING | Facility: HOSPITAL | Age: 68
Discharge: HOME | End: 2023-11-14
Admitting: INTERNAL MEDICINE
Payer: OTHER GOVERNMENT

## 2023-11-14 DIAGNOSIS — J60 BLACK LUNG DISEASE: ICD-10-CM

## 2023-11-14 DIAGNOSIS — J44.9 CHRONIC OBSTRUCTIVE PULMONARY DISEASE, UNSPECIFIED COPD TYPE: ICD-10-CM

## 2023-11-14 PROCEDURE — 71250 CT THORAX DX C-: CPT

## 2023-11-28 ENCOUNTER — HOSPITAL ENCOUNTER (OUTPATIENT)
Age: 68
Setting detail: OUTPATIENT SURGERY
Discharge: HOME | End: 2023-11-28
Attending: INTERNAL MEDICINE | Admitting: INTERNAL MEDICINE
Payer: OTHER GOVERNMENT

## 2023-11-28 ENCOUNTER — ANESTHESIA EVENT (OUTPATIENT)
Dept: ENDOSCOPY | Age: 68
End: 2023-11-28
Payer: OTHER GOVERNMENT

## 2023-11-28 ENCOUNTER — ANESTHESIA (OUTPATIENT)
Dept: ENDOSCOPY | Age: 68
End: 2023-11-28
Payer: OTHER GOVERNMENT

## 2023-11-28 ENCOUNTER — APPOINTMENT (OUTPATIENT)
Dept: GENERAL RADIOLOGY | Age: 68
End: 2023-11-28
Attending: INTERNAL MEDICINE
Payer: OTHER GOVERNMENT

## 2023-11-28 VITALS
HEART RATE: 57 BPM | TEMPERATURE: 97 F | HEIGHT: 68 IN | BODY MASS INDEX: 27.89 KG/M2 | DIASTOLIC BLOOD PRESSURE: 79 MMHG | WEIGHT: 184 LBS | RESPIRATION RATE: 16 BRPM | SYSTOLIC BLOOD PRESSURE: 157 MMHG | OXYGEN SATURATION: 100 %

## 2023-11-28 PROCEDURE — 7100000010 HC PHASE II RECOVERY - FIRST 15 MIN: Performed by: INTERNAL MEDICINE

## 2023-11-28 PROCEDURE — 2720000010 HC SURG SUPPLY STERILE: Performed by: INTERNAL MEDICINE

## 2023-11-28 PROCEDURE — 6370000000 HC RX 637 (ALT 250 FOR IP): Performed by: INTERNAL MEDICINE

## 2023-11-28 PROCEDURE — 43275 ERCP REMOVE FORGN BODY DUCT: CPT | Performed by: INTERNAL MEDICINE

## 2023-11-28 PROCEDURE — 74328 X-RAY BILE DUCT ENDOSCOPY: CPT

## 2023-11-28 PROCEDURE — C1769 GUIDE WIRE: HCPCS | Performed by: INTERNAL MEDICINE

## 2023-11-28 PROCEDURE — 7100000011 HC PHASE II RECOVERY - ADDTL 15 MIN: Performed by: INTERNAL MEDICINE

## 2023-11-28 PROCEDURE — 2580000003 HC RX 258: Performed by: INTERNAL MEDICINE

## 2023-11-28 PROCEDURE — 3700000000 HC ANESTHESIA ATTENDED CARE: Performed by: INTERNAL MEDICINE

## 2023-11-28 PROCEDURE — 6360000002 HC RX W HCPCS

## 2023-11-28 PROCEDURE — 74328 X-RAY BILE DUCT ENDOSCOPY: CPT | Performed by: INTERNAL MEDICINE

## 2023-11-28 PROCEDURE — 3700000001 HC ADD 15 MINUTES (ANESTHESIA): Performed by: INTERNAL MEDICINE

## 2023-11-28 PROCEDURE — 3609015000 HC ERCP REMOVE FOREIGN BODY/STENT BILIARY/PANC DUCT: Performed by: INTERNAL MEDICINE

## 2023-11-28 PROCEDURE — 2500000003 HC RX 250 WO HCPCS

## 2023-11-28 PROCEDURE — 2709999900 HC NON-CHARGEABLE SUPPLY: Performed by: INTERNAL MEDICINE

## 2023-11-28 PROCEDURE — 43264 ERCP REMOVE DUCT CALCULI: CPT | Performed by: INTERNAL MEDICINE

## 2023-11-28 RX ORDER — FLUTICASONE PROPIONATE 220 UG/1
1 AEROSOL, METERED RESPIRATORY (INHALATION) 2 TIMES DAILY
COMMUNITY

## 2023-11-28 RX ORDER — SODIUM CHLORIDE, SODIUM LACTATE, POTASSIUM CHLORIDE, CALCIUM CHLORIDE 600; 310; 30; 20 MG/100ML; MG/100ML; MG/100ML; MG/100ML
INJECTION, SOLUTION INTRAVENOUS CONTINUOUS
Status: DISCONTINUED | OUTPATIENT
Start: 2023-11-28 | End: 2023-11-28 | Stop reason: HOSPADM

## 2023-11-28 RX ORDER — INDOMETHACIN 50 MG/1
100 SUPPOSITORY RECTAL ONCE
Status: DISCONTINUED | OUTPATIENT
Start: 2023-11-28 | End: 2023-11-28 | Stop reason: HOSPADM

## 2023-11-28 RX ORDER — LIDOCAINE HYDROCHLORIDE 10 MG/ML
INJECTION, SOLUTION EPIDURAL; INFILTRATION; INTRACAUDAL; PERINEURAL PRN
Status: DISCONTINUED | OUTPATIENT
Start: 2023-11-28 | End: 2023-11-28 | Stop reason: SDUPTHER

## 2023-11-28 RX ORDER — ALLOPURINOL 100 MG/1
100 TABLET ORAL DAILY
COMMUNITY

## 2023-11-28 RX ORDER — TIOTROPIUM BROMIDE 18 UG/1
18 CAPSULE ORAL; RESPIRATORY (INHALATION) DAILY
COMMUNITY

## 2023-11-28 RX ORDER — PROPOFOL 10 MG/ML
INJECTION, EMULSION INTRAVENOUS PRN
Status: DISCONTINUED | OUTPATIENT
Start: 2023-11-28 | End: 2023-11-28 | Stop reason: SDUPTHER

## 2023-11-28 RX ORDER — INDOMETHACIN 50 MG/1
SUPPOSITORY RECTAL PRN
Status: DISCONTINUED | OUTPATIENT
Start: 2023-11-28 | End: 2023-11-28 | Stop reason: HOSPADM

## 2023-11-28 RX ADMIN — LIDOCAINE HYDROCHLORIDE 50 MG: 10 INJECTION, SOLUTION EPIDURAL; INFILTRATION; INTRACAUDAL; PERINEURAL at 10:48

## 2023-11-28 RX ADMIN — PROPOFOL 50 MG: 10 INJECTION, EMULSION INTRAVENOUS at 11:05

## 2023-11-28 RX ADMIN — PROPOFOL 50 MG: 10 INJECTION, EMULSION INTRAVENOUS at 10:55

## 2023-11-28 RX ADMIN — PROPOFOL 50 MG: 10 INJECTION, EMULSION INTRAVENOUS at 11:01

## 2023-11-28 RX ADMIN — SODIUM CHLORIDE, POTASSIUM CHLORIDE, SODIUM LACTATE AND CALCIUM CHLORIDE: 600; 310; 30; 20 INJECTION, SOLUTION INTRAVENOUS at 10:24

## 2023-11-28 RX ADMIN — PROPOFOL 100 MG: 10 INJECTION, EMULSION INTRAVENOUS at 10:48

## 2023-11-28 ASSESSMENT — ENCOUNTER SYMPTOMS: SHORTNESS OF BREATH: 0

## 2023-11-28 ASSESSMENT — PAIN - FUNCTIONAL ASSESSMENT: PAIN_FUNCTIONAL_ASSESSMENT: 0-10

## 2023-11-28 ASSESSMENT — LIFESTYLE VARIABLES: SMOKING_STATUS: 0

## 2023-11-28 NOTE — OP NOTE
Endoscopic Procedure Note    Patient: Renato Comer: 1955  Med Rec#: [id number] Acc#: [id number]     Primary Care Provider SANKET Ding CNP  Referring Provider:     Endoscopist: Srinath Rodriguez MD    Date of Procedure:  11/28/2023     Procedure:   1. ERCP with stent removal  2. ERCP with stone removal    Indications:   1. Recent ERCP with stone/sludge removal and cholangitis. 2. S/p interval cholecystectomy. Anesthesia:  General     Estimated Blood Loss: minimal    Procedure:   Prior to the procedure the patient's chart was reviewed and informed consent was obtained. Risk and Benefits (Risks including but not limited to bleeding, perforation, infection, 8 to 10% risk of post ERCP pancreatitis, and even death) were discussed with the patient. They were agreeable to continue. Patient was brought to the operating room, underwent general anesthesia, and placed in the prone position. A side-viewing duodenoscope was advanced from the oropharynx down to the distal duodenum and reduced into a short position opposite the ampulla. The ampulla appeared c/w previous stent placement. A  film was obtained which showed stent in proper position. Stent removal:  An existing stent was seen emanating from the ampulla. This was grasped and removed with rat-toothed forceps in its entirety through the scope. The bile duct was then cannulated using a 0.035 x 260 cm straight Dreamwire. A short nose traction sphincterotome was advanced over the wire and the bile duct was deeply cannulated. Contrast was injected. I personally interpreted the bile duct images. The flow of contrast was adequate. Contrast injection showed filling defects in the distal CBD. The pancreatic duct was not cannulated nor injected. The bile duct was swept multiple times starting the bifurcation with a 15mm biliary extraction balloon. Multiple small stones and sludge was removed.   Occlusion cholangiogram showed no

## 2023-11-28 NOTE — ANESTHESIA POSTPROCEDURE EVALUATION
Department of Anesthesiology  Postprocedure Note    Patient: Ene Dominguez  MRN: [id number]  YOB: 1955  Date of evaluation: 11/28/2023      Procedure Summary     Date: 11/28/23 Room / Location: [address]    Anesthesia Start: 7267 Anesthesia Stop: 1069    Procedure: ERCP STENT REMOVAL (Abdomen) Diagnosis:       Encounter for removal of biliary stent      (Encounter for removal of biliary stent [Z46.89])    Surgeons: Liam Torre MD Responsible Provider: SANKET Carrasquillo CRNA    Anesthesia Type: general, TIVA ASA Status: 3          Anesthesia Type: No value filed.     Sang Phase I: Sang Score: 10    Sang Phase II:        Anesthesia Post Evaluation    Patient location during evaluation: PACU  Patient participation: complete - patient participated  Level of consciousness: sleepy but conscious  Pain score: 0  Airway patency: patent  Nausea & Vomiting: no nausea and no vomiting  Complications: no  Cardiovascular status: blood pressure returned to baseline  Respiratory status: acceptable  Pain management: adequate

## 2023-11-28 NOTE — DISCHARGE INSTRUCTIONS
RECOMMENDATIONS:    1. Clear liquid diet today with advancing diet tomorrow as tolerated. 2.  Please call with any questions/concerns. #[phone]    Endoscopic Retrograde Cholangiopancreatogram (ERCP): What to Expect at [address]  After you have an endoscopic retrograde cholangiopancreatogram (ERCP), you probably will stay at the hospital or clinic for 1 to 2 hours. This will allow the medicine to wear off. You will be able to go home after your doctor or a nurse checks to make sure you are not having any problems. If you stay in the hospital overnight, you may go home the next day. You may have a sore throat for a day or two after the procedure. This care sheet gives you a general idea about how long it will take for you to recover. But each person recovers at a different pace. Follow the steps below to get better as quickly as possible. How can you care for yourself at home? Activity    Rest as much as you need to after you go home. You should be able to go back to your usual activities the day after the procedure. Diet    Follow your doctor's directions for eating after the procedure. Drink plenty of fluids (unless your doctor tells you not to). Medicines    Your doctor will tell you if and when you can restart your medicines. He or she will also give you instructions about taking any new medicines. If you stopped taking aspirin or some other blood thinner, your doctor will tell you when to start taking it again. If you have a sore throat the next day, use an over-the-counter spray to numb your throat. Be safe with medicines. Read and follow all instructions on the label. Follow-up care is a key part of your treatment and safety. Be sure to make and go to all appointments, and call your doctor if you are having problems. It's also a good idea to know your test results and keep a list of the medicines you take. When should you call for help?    Call [phone] anytime you

## 2023-11-28 NOTE — ANESTHESIA PRE PROCEDURE
13.4 09/01/2023 07:40 AM    RBC 4.53 09/01/2023 07:40 AM    HGB 12.2 09/01/2023 07:40 AM    HCT 39.4 09/01/2023 07:40 AM    MCV 87.0 09/01/2023 07:40 AM    RDW 19.7 09/01/2023 07:40 AM     09/01/2023 07:40 AM       CMP:   Lab Results   Component Value Date/Time     09/01/2023 07:40 AM    K 4.3 09/01/2023 07:40 AM    K 5.0 09/01/2021 04:46 AM     09/01/2023 07:40 AM    CO2 30 09/01/2023 07:40 AM    BUN 27 09/01/2023 07:40 AM    CREATININE 1.2 09/01/2023 07:40 AM    GFRAA >59 09/01/2021 04:46 AM    LABGLOM >60 09/01/2023 07:40 AM    GLUCOSE 82 09/01/2023 07:40 AM    PROT 7.2 09/01/2023 07:40 AM    CALCIUM 9.6 09/01/2023 07:40 AM    BILITOT 1.0 09/01/2023 07:40 AM    ALKPHOS 310 09/01/2023 07:40 AM    AST 66 09/01/2023 07:40 AM     09/01/2023 07:40 AM       POC Tests: No results for input(s): \"POCGLU\", \"POCNA\", \"POCK\", \"POCCL\", \"POCBUN\", \"POCHEMO\", \"POCHCT\" in the last 72 hours. Coags:   Lab Results   Component Value Date/Time    PROTIME 13.3 08/27/2021 09:25 PM    INR 0.99 08/27/2021 09:25 PM    APTT 51.0 09/01/2021 10:10 AM       HCG (If Applicable): No results found for: \"PREGTESTUR\", \"PREGSERUM\", \"HCG\", \"HCGQUANT\"     ABGs: No results found for: \"PHART\", \"PO2ART\", \"NSD5RBI\", \"HAA6MVC\", \"BEART\", \"U3DGFAAW\"     Type & Screen (If Applicable):  No results found for: \"LABABO\", \"LABRH\"    Drug/Infectious Status (If Applicable):  No results found for: \"HIV\", \"HEPCAB\"    COVID-19 Screening (If Applicable): No results found for: \"COVID19\"        Anesthesia Evaluation  Patient summary reviewed and Nursing notes reviewed no history of anesthetic complications:   Airway: Mallampati: II  TM distance: >3 FB   Neck ROM: full  Mouth opening: > = 3 FB   Dental:    (+) edentulous      Pulmonary:Negative Pulmonary ROS and normal exam    (+) decreased breath sounds: bilateral     (-) shortness of breath and not a current smoker          Patient did not smoke on day of surgery.                 ROS comment:

## 2023-11-28 NOTE — H&P
Patient Name: Arnold Loredo  : [birth year]  MRN: [id number]  DATE: 23    Allergies: Allergies   Allergen Reactions    Levalbuterol     Xopenex [Levalbuterol Hcl] Anxiety        ENDOSCOPY  History and Physical    Procedure:    [] Diagnostic Colonoscopy       [] Screening Colonoscopy  [] EGD      [x] ERCP      [] EUS       [] Other    [x] Previous office notes/History and Physical reviewed from the patients chart. Please see EMR for further details of HPI. I have examined the patient's status immediately prior to the procedure and:      Indications/HPI:    []Abdominal Pain   []Barretts  []Screening/Surveillance   []History of Polyps  []Dysphagia            [] +Cologard/DNA testing  []Abnormal Imaging              []EOE Hx              [] Family Hx of CRC/Polyps  []Anemia                            []Food Impaction       []Recent Poor Prep  []GI Bleed             []Lymphadenopathy  []History of Polyps  []Change in bowel habits []Heartburn/Reflux  []Cancer- GI/Lung  []Chest Pain - Non Cardiac []Heme (+) Stool []Ulcers  []Constipation  []Hemoptysis  []Incontinence    []Diarrhea  []Hypoxemia  []Rectal Bleed (BRBPR)  []Nausea/Vomiting   [] Varices  []Crohns/Colitis  []Pancreatic Cyst   [] Cirrhosis   []Pancreatitis    []Abnormal MRCP  []Elevated LFT [x] Stent Removal, Previous ERCP  []Other:     Anesthesia:   [x] MAC [] Moderate Sedation   [] General   [] None     ROS: 12 pt Review of Symptoms was negative unless mentioned above    Medications:   Prior to Admission medications    Medication Sig Start Date End Date Taking?  Authorizing Provider   allopurinol (ZYLOPRIM) 100 MG tablet Take 1 tablet by mouth daily   Yes Lakia Christianson MD   fluticasone (FLOVENT HFA) 220 MCG/ACT inhaler Inhale 1 puff into the lungs 2 times daily 250   Yes Lakia Christianson MD   tiotropium (SPIRIVA) 18 MCG inhalation capsule Inhale 1 capsule into the lungs daily   Yes Lakia Christianson MD   zolpidem (AMBIEN) 10 MG

## 2023-12-04 ENCOUNTER — OFFICE VISIT (OUTPATIENT)
Dept: PULMONOLOGY | Facility: CLINIC | Age: 68
End: 2023-12-04
Payer: OTHER GOVERNMENT

## 2023-12-04 VITALS
WEIGHT: 182.4 LBS | DIASTOLIC BLOOD PRESSURE: 82 MMHG | BODY MASS INDEX: 27.65 KG/M2 | SYSTOLIC BLOOD PRESSURE: 134 MMHG | OXYGEN SATURATION: 96 % | HEIGHT: 68 IN | HEART RATE: 93 BPM

## 2023-12-04 DIAGNOSIS — R09.02 HYPOXEMIA REQUIRING SUPPLEMENTAL OXYGEN: ICD-10-CM

## 2023-12-04 DIAGNOSIS — Z99.81 HYPOXEMIA REQUIRING SUPPLEMENTAL OXYGEN: ICD-10-CM

## 2023-12-04 DIAGNOSIS — J84.10 GRANULOMATOUS LUNG DISEASE: ICD-10-CM

## 2023-12-04 DIAGNOSIS — J47.9 BRONCHIECTASIS WITHOUT ACUTE EXACERBATION: ICD-10-CM

## 2023-12-04 DIAGNOSIS — Z87.01 HISTORY OF PNEUMONIA: ICD-10-CM

## 2023-12-04 DIAGNOSIS — J84.9 ILD (INTERSTITIAL LUNG DISEASE): ICD-10-CM

## 2023-12-04 DIAGNOSIS — J60 BLACK LUNG DISEASE: ICD-10-CM

## 2023-12-04 DIAGNOSIS — Z92.89 HISTORY OF MECHANICAL VENTILATION: ICD-10-CM

## 2023-12-04 DIAGNOSIS — Z87.891 FORMER SMOKER: ICD-10-CM

## 2023-12-04 DIAGNOSIS — J30.89 NON-SEASONAL ALLERGIC RHINITIS, UNSPECIFIED TRIGGER: ICD-10-CM

## 2023-12-04 DIAGNOSIS — G47.33 OSA (OBSTRUCTIVE SLEEP APNEA): ICD-10-CM

## 2023-12-04 DIAGNOSIS — Z86.16 HISTORY OF COVID-19: ICD-10-CM

## 2023-12-04 DIAGNOSIS — J44.9 CHRONIC OBSTRUCTIVE PULMONARY DISEASE, UNSPECIFIED COPD TYPE: Primary | ICD-10-CM

## 2023-12-04 DIAGNOSIS — J44.9 CHRONIC OBSTRUCTIVE PULMONARY DISEASE, UNSPECIFIED COPD TYPE: ICD-10-CM

## 2023-12-04 PROCEDURE — 99214 OFFICE O/P EST MOD 30 MIN: CPT | Performed by: INTERNAL MEDICINE

## 2023-12-04 PROCEDURE — 94375 RESPIRATORY FLOW VOLUME LOOP: CPT | Performed by: INTERNAL MEDICINE

## 2023-12-04 PROCEDURE — 94727 GAS DIL/WSHOT DETER LNG VOL: CPT | Performed by: INTERNAL MEDICINE

## 2023-12-04 PROCEDURE — 94729 DIFFUSING CAPACITY: CPT | Performed by: INTERNAL MEDICINE

## 2023-12-04 RX ORDER — FERROUS GLUCONATE 324(38)MG
324 TABLET ORAL
COMMUNITY

## 2023-12-04 RX ORDER — FLUTICASONE PROPIONATE AND SALMETEROL 250; 50 UG/1; UG/1
1 POWDER RESPIRATORY (INHALATION)
COMMUNITY
Start: 2023-11-14

## 2023-12-04 RX ORDER — ALBUTEROL SULFATE 90 UG/1
2 AEROSOL, METERED RESPIRATORY (INHALATION) EVERY 4 HOURS PRN
Qty: 6.7 G | Refills: 5 | Status: SHIPPED | OUTPATIENT
Start: 2023-12-04

## 2023-12-04 RX ORDER — MULTIPLE VITAMINS W/ MINERALS TAB 9MG-400MCG
1 TAB ORAL DAILY
COMMUNITY

## 2023-12-04 RX ORDER — MELATONIN 10 MG
20 CAPSULE ORAL DAILY
COMMUNITY

## 2023-12-04 RX ORDER — ALLOPURINOL 100 MG/1
1 TABLET ORAL DAILY
COMMUNITY
Start: 2023-10-26

## 2024-05-11 PROCEDURE — 88305 TISSUE EXAM BY PATHOLOGIST: CPT | Performed by: GENERAL PRACTICE

## 2024-05-11 PROCEDURE — 88112 CYTOPATH CELL ENHANCE TECH: CPT | Performed by: GENERAL PRACTICE

## 2024-05-13 ENCOUNTER — LAB REQUISITION (OUTPATIENT)
Dept: LAB | Facility: HOSPITAL | Age: 69
End: 2024-05-13

## 2024-05-13 DIAGNOSIS — J44.89 OTHER SPECIFIED CHRONIC OBSTRUCTIVE PULMONARY DISEASE: ICD-10-CM

## 2024-05-15 LAB
CYTO UR: NORMAL
LAB AP CASE REPORT: NORMAL
LAB AP DIAGNOSIS COMMENT: NORMAL
Lab: NORMAL
PATH REPORT.FINAL DX SPEC: NORMAL
PATH REPORT.GROSS SPEC: NORMAL

## 2024-06-04 ENCOUNTER — OFFICE VISIT (OUTPATIENT)
Dept: PULMONOLOGY | Facility: CLINIC | Age: 69
End: 2024-06-04
Payer: OTHER GOVERNMENT

## 2024-06-04 VITALS
DIASTOLIC BLOOD PRESSURE: 80 MMHG | HEIGHT: 68 IN | BODY MASS INDEX: 29.46 KG/M2 | HEART RATE: 75 BPM | SYSTOLIC BLOOD PRESSURE: 130 MMHG | WEIGHT: 194.4 LBS | OXYGEN SATURATION: 98 %

## 2024-06-04 DIAGNOSIS — Z99.81 HYPOXEMIA REQUIRING SUPPLEMENTAL OXYGEN: ICD-10-CM

## 2024-06-04 DIAGNOSIS — G47.33 OSA (OBSTRUCTIVE SLEEP APNEA): ICD-10-CM

## 2024-06-04 DIAGNOSIS — J44.9 CHRONIC OBSTRUCTIVE PULMONARY DISEASE, UNSPECIFIED COPD TYPE: Primary | ICD-10-CM

## 2024-06-04 DIAGNOSIS — J60 BLACK LUNG DISEASE: ICD-10-CM

## 2024-06-04 DIAGNOSIS — J47.9 BRONCHIECTASIS WITHOUT ACUTE EXACERBATION: ICD-10-CM

## 2024-06-04 DIAGNOSIS — Z92.89 HISTORY OF MECHANICAL VENTILATION: ICD-10-CM

## 2024-06-04 DIAGNOSIS — Z87.01 HISTORY OF PNEUMONIA: ICD-10-CM

## 2024-06-04 DIAGNOSIS — R09.02 HYPOXEMIA REQUIRING SUPPLEMENTAL OXYGEN: ICD-10-CM

## 2024-06-04 DIAGNOSIS — Z86.16 HISTORY OF COVID-19: ICD-10-CM

## 2024-06-04 DIAGNOSIS — J84.9 ILD (INTERSTITIAL LUNG DISEASE): ICD-10-CM

## 2024-06-04 DIAGNOSIS — J30.89 NON-SEASONAL ALLERGIC RHINITIS, UNSPECIFIED TRIGGER: ICD-10-CM

## 2024-06-04 PROCEDURE — 99214 OFFICE O/P EST MOD 30 MIN: CPT | Performed by: INTERNAL MEDICINE

## 2024-06-04 RX ORDER — CYCLOBENZAPRINE HCL 5 MG
5 TABLET ORAL 2 TIMES DAILY PRN
COMMUNITY
Start: 2024-05-16

## 2024-06-04 RX ORDER — ALBUTEROL SULFATE 2.5 MG/3ML
2.5 SOLUTION RESPIRATORY (INHALATION) EVERY 4 HOURS PRN
Qty: 360 ML | Refills: 5 | Status: SHIPPED | OUTPATIENT
Start: 2024-06-04

## 2024-06-04 RX ORDER — NALOXONE HYDROCHLORIDE 4 MG/.1ML
SPRAY NASAL
COMMUNITY
Start: 2024-05-07

## 2024-06-04 RX ORDER — IPRATROPIUM BROMIDE AND ALBUTEROL SULFATE 2.5; .5 MG/3ML; MG/3ML
3 SOLUTION RESPIRATORY (INHALATION)
COMMUNITY
Start: 2024-03-29 | End: 2024-06-04 | Stop reason: ALTCHOICE

## 2024-12-13 ENCOUNTER — HOSPITAL ENCOUNTER (OUTPATIENT)
Dept: CT IMAGING | Facility: HOSPITAL | Age: 69
Discharge: HOME | End: 2024-12-13
Admitting: INTERNAL MEDICINE
Payer: OTHER GOVERNMENT

## 2024-12-18 ENCOUNTER — TELEPHONE (OUTPATIENT)
Dept: PULMONOLOGY | Facility: CLINIC | Age: 69
End: 2024-12-18
Payer: OTHER GOVERNMENT

## 2024-12-18 NOTE — TELEPHONE ENCOUNTER
Called patient.  No answer.  Left message for them to call me back, need to get his CAT SCAN chest done before his appointment this Friday .

## 2024-12-18 NOTE — TELEPHONE ENCOUNTER
Relayed message to patient's wife. She is going to call scheduling and try to reschedule his CT before his appointment.

## 2024-12-19 ENCOUNTER — HOSPITAL ENCOUNTER (OUTPATIENT)
Dept: CT IMAGING | Facility: HOSPITAL | Age: 69
Discharge: HOME | End: 2024-12-19
Admitting: INTERNAL MEDICINE
Payer: OTHER GOVERNMENT

## 2024-12-19 ENCOUNTER — TELEPHONE (OUTPATIENT)
Dept: PULMONOLOGY | Facility: CLINIC | Age: 69
End: 2024-12-19
Payer: OTHER GOVERNMENT

## 2024-12-19 DIAGNOSIS — J44.9 CHRONIC OBSTRUCTIVE PULMONARY DISEASE, UNSPECIFIED COPD TYPE: ICD-10-CM

## 2024-12-19 DIAGNOSIS — J84.9 ILD (INTERSTITIAL LUNG DISEASE): ICD-10-CM

## 2024-12-19 DIAGNOSIS — J47.9 BRONCHIECTASIS WITHOUT ACUTE EXACERBATION: ICD-10-CM

## 2024-12-19 PROCEDURE — 71250 CT THORAX DX C-: CPT

## 2024-12-19 NOTE — TELEPHONE ENCOUNTER
Name: RACHEL ALAS    Relationship: Emergency Contact    Best call back number: [phone]     Chief complaint: PATIENT IS GETTING CT SCAN TODAY 12/19 AND HIS APPT WITH DR. CAMARILLO IS TOMORROW 12/20. PATIENT NEEDS TO RESCHEDULE APPT SO RESULTS WILL BE AVAILABLE, BUT DR. CAMARILLO'S NEXT AVAILABLE APPOINTMENT IS IN MARCH. PATIENT'S WIFE IS ALSO HAVING SURGERY ON 12/20/2024. PLEASE CALL TO ADVISE     Type of visit: FOLLOW UP      Patient would like to Schedule a Follow-up. Unable to schedule within the 3 week timeframe.

## 2024-12-20 ENCOUNTER — TELEPHONE (OUTPATIENT)
Dept: PULMONOLOGY | Facility: CLINIC | Age: 69
End: 2024-12-20

## 2024-12-20 NOTE — TELEPHONE ENCOUNTER
----- Message from [employer] sent at 12/19/2024  1:24 PM CST -----  Please let him know he has an elevated right hemidiaphragm and did not have any significant pulmonary fibrosis noted.  I will see him back in the office as scheduled.  Thank you.   Pt ambulatory to the restroom.

## 2024-12-23 ENCOUNTER — OFFICE VISIT (OUTPATIENT)
Dept: PULMONOLOGY | Facility: CLINIC | Age: 69
End: 2024-12-23
Payer: OTHER GOVERNMENT

## 2024-12-23 VITALS
WEIGHT: 189 LBS | SYSTOLIC BLOOD PRESSURE: 115 MMHG | OXYGEN SATURATION: 97 % | HEIGHT: 68 IN | DIASTOLIC BLOOD PRESSURE: 70 MMHG | HEART RATE: 83 BPM | BODY MASS INDEX: 28.64 KG/M2

## 2024-12-23 DIAGNOSIS — J30.89 NON-SEASONAL ALLERGIC RHINITIS, UNSPECIFIED TRIGGER: ICD-10-CM

## 2024-12-23 DIAGNOSIS — J84.9 ILD (INTERSTITIAL LUNG DISEASE): ICD-10-CM

## 2024-12-23 DIAGNOSIS — Z86.16 HISTORY OF COVID-19: ICD-10-CM

## 2024-12-23 DIAGNOSIS — Z87.01 HISTORY OF PNEUMONIA: ICD-10-CM

## 2024-12-23 DIAGNOSIS — R09.02 HYPOXEMIA REQUIRING SUPPLEMENTAL OXYGEN: ICD-10-CM

## 2024-12-23 DIAGNOSIS — J84.10 GRANULOMATOUS LUNG DISEASE: ICD-10-CM

## 2024-12-23 DIAGNOSIS — J60 BLACK LUNG DISEASE: ICD-10-CM

## 2024-12-23 DIAGNOSIS — J47.9 BRONCHIECTASIS WITHOUT ACUTE EXACERBATION: ICD-10-CM

## 2024-12-23 DIAGNOSIS — G47.33 OSA (OBSTRUCTIVE SLEEP APNEA): ICD-10-CM

## 2024-12-23 DIAGNOSIS — J44.9 CHRONIC OBSTRUCTIVE PULMONARY DISEASE, UNSPECIFIED COPD TYPE: Primary | ICD-10-CM

## 2024-12-23 DIAGNOSIS — Z99.81 HYPOXEMIA REQUIRING SUPPLEMENTAL OXYGEN: ICD-10-CM

## 2024-12-23 DIAGNOSIS — Z87.891 FORMER SMOKER: ICD-10-CM

## 2024-12-23 PROCEDURE — 99214 OFFICE O/P EST MOD 30 MIN: CPT | Performed by: INTERNAL MEDICINE

## 2024-12-23 RX ORDER — METOPROLOL TARTRATE 25 MG/1
25 TABLET, FILM COATED ORAL DAILY
COMMUNITY

## 2024-12-23 NOTE — PROGRESS NOTES
[facility] OUTPATIENT PROGRESS NOTE    Patient: Deshaun Mena  : 1955  Age: 69 y.o.  Date of Service: 2024    REASON FOR CLINIC VISIT:  Chief Complaint   Patient presents with    Chronic obstructive pulmonary disease, unspecified COPD type       Subjective:    History of Present Illness:  Deshaun Mena is a 69 y.o. male who presents to the office today to be seen for    Diagnosis Plan   1. Chronic obstructive pulmonary disease, unspecified COPD type        2. Granulomatous lung disease        3. History of pneumonia        4. Hypoxemia requiring supplemental oxygen        5. ILD (interstitial lung disease)        6. Bronchiectasis without acute exacerbation        7. Black lung disease        8. MARIA ALEJANDRA (obstructive sleep apnea)        9. Former smoker        10. History of COVID-19        11. Non-seasonal allergic rhinitis, unspecified trigger        .  Other problems per record.    History:    Patient is a very pleasant middle-aged  gentleman was seen in the pulmonary clinic for a follow-up visit.  Attended the clinic with his wife.    He is a former smoker and has chronic obstructive pulmonary disease with granulomatous lung disease and also had history of pneumonia and he also has interstitial lung disease and bronchiectasis.  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  Patient has obstructive sleep apnea as well but does not use any CPAP use only oxygen at home.  He has nasal allergy and allergic rhinitis.  For her underlying lung disease he is using Advair and also uses albuterol rescue inhaler as needed.  He has nasal allergy and currently using fluticasone nasal spray and loratadine but not using those regularly.  His allergy symptoms are little better in the winter but they did bother him during the summertime.    He appears to be stable at his baseline and staying with his  wife.  He did not have any recent hospital admissions and ER visit and urgent care with any other new complaints and he does not take any vaccinations.  His last pulmonary function test done in December showed moderate obstructive airway dysfunction with some restrictive dysfunction.  He had a recent CT scan of the chest done last week with the clinic visit which shows stable chronic groundglass changes and no new lung nodules there is elevated right hemidiaphragm noted.    PFT done today:  Not done today    PFT Values          2023    14:00   Pre Drug PFT Results   FVC 66   FEV1 61   FEF 25-75% 51   FEV1/FVC 71   Other Tests PFT Results   TLC 79      DLCO 71   D/VAsb 96     Results for orders placed in visit on 23    Pulmonary Function Test    Narrative  Pulmonary Function Test    Performed by: Cathi Guerra, RRT  Authorized by: Kathryn Anaya MD  Pre Drug % Predicted  FVC: 66%  FEV1: 61%  FEF 25-75%: 51%  FEV1/FVC: 71%  T%  RV: 103%  DLCO: 71%  D/VAsb: 96%    Interpretation  Overall comments:  Above test results are acceptable and reports will bite his criteria  Balance of the above test results the patient showed evidence of moderate obstructive and mild to moderate restrictive dysfunction.  No bronchodilator challenge was done.  The patient shows no FEV1 FEV V1/FVC ratio and FEF 25 to 75% suggesting obstructive dysfunction into the lung consistent decreased suggesting restrictive dysfunction.  Diffusion capacity corrected for alveolar volume is within normal limits.    Patient had pulmonary function test done in the past but it was done [facility] or outside facility and is not available for comparison.  Clinical correlation is indicated.    Kathryn Anaya MD  Pulmonologist/Intensivist  2023 17:27 CST         Bronchodilator therapy: Advair , Albuterol nebulizer and rescue inhaler      Smoking Status:   Social History     Tobacco Use   Smoking Status Former     Current packs/day: 0.00    Average packs/day: 1 pack/day for 15.0 years (15.0 ttl pk-yrs)    Types: Cigarettes    Start date: 1978    Quit date: 1993    Years since quittin.9    Passive exposure: Past   Smokeless Tobacco Never     Pulm Rehab: no  Sleep: yes. On 3 LPM oxygen all the time and not on CPAP     Support System: lives with their spouse    Code Status:   There are no questions and answers to display.        Review of Systems:  A complete review of systems is performed and all other systems were reviewed and negative as note above in the HPI.  Review of Systems   Constitutional:  Positive for fatigue.   HENT:  Positive for congestion, postnasal drip and sinus pressure.    Eyes: Negative.    Respiratory:  Positive for cough, chest tightness and shortness of breath.    Cardiovascular: Negative.    Gastrointestinal: Negative.    Endocrine: Negative.    Genitourinary: Negative.    Musculoskeletal:  Positive for arthralgias and back pain.   Skin: Negative.    Allergic/Immunologic: Positive for environmental allergies.   Neurological: Negative.    Hematological: Negative.    Psychiatric/Behavioral: Negative.         CAT/ACT Score:  Not done today    Medications:  Outpatient Encounter Medications as of 2024   Medication Sig Dispense Refill    albuterol (PROVENTIL) (2.5 MG/3ML) 0.083% nebulizer solution Take 2.5 mg by nebulization Every 4 (Four) Hours As Needed for Wheezing. 360 mL 5    albuterol sulfate  (90 Base) MCG/ACT inhaler Inhale 2 puffs Every 4 (Four) Hours As Needed for Wheezing. 6.7 g 5    allopurinol (ZYLOPRIM) 100 MG tablet Take 1 tablet by mouth Daily.      atorvastatin (LIPITOR) 80 MG tablet Take 1 tablet by mouth Every Night.      cholecalciferol (VITAMIN D3) 25 MCG (1000 UT) tablet Take 2 tablets by mouth Daily. 30 tablet 0    cyclobenzaprine (FLEXERIL) 5 MG tablet Take 1 tablet by mouth 2 (Two) Times a Day As Needed. (Patient taking differently: Take 1 tablet by mouth As  Needed.)      fluticasone (Flonase Allergy Relief) 50 MCG/ACT nasal spray 2 sprays into the nostril(s) as directed by provider Daily. (Patient taking differently: Administer 2 sprays into the nostril(s) as directed by provider As Needed.) 16 g 11    Fluticasone-Salmeterol (ADVAIR/WIXELA) 250-50 MCG/ACT DISKUS Inhale 1 puff 2 (Two) Times a Day.      furosemide (LASIX) 40 MG tablet Take 1 tablet by mouth Daily.      HYDROcodone-acetaminophen (NORCO)  MG per tablet Take 1 tablet by mouth 2 (Two) Times a Day.      magnesium oxide (MAG-OX) 400 MG tablet Take 1 tablet by mouth Daily.      Melatonin 10 MG capsule Take 2 capsules by mouth Daily.      metoprolol tartrate (LOPRESSOR) 25 MG tablet Take 1 tablet by mouth Daily. Started medication 12/22/24 -trail run to see if it works for dizziness.      Mucinex 600 MG 12 hr tablet Take 2 tablets by mouth. (Patient taking differently: Take 2 tablets by mouth As Needed.)      O2 (OXYGEN) Inhale 3 L/min Continuous.      omeprazole (priLOSEC) 20 MG capsule Take 1 capsule by mouth Every Night.      rivaroxaban (XARELTO) 20 MG tablet Take 1 tablet by mouth Every Night.      spironolactone (ALDACTONE) 25 MG tablet Take 1 tablet by mouth Daily.      tamsulosin (FLOMAX) 0.4 MG capsule 24 hr capsule Take 1 capsule by mouth Daily.      tiotropium bromide monohydrate (SPIRIVA RESPIMAT) 2.5 MCG/ACT aerosol solution inhaler Inhale 2 puffs Daily.      zolpidem (AMBIEN) 10 MG tablet Take 1 tablet by mouth At Night As Needed for Sleep.      [DISCONTINUED] albuterol sulfate  (90 Base) MCG/ACT inhaler Inhale 2 puffs Every 4 (Four) Hours As Needed for Wheezing.      ascorbic acid (VITAMIN C) 500 MG tablet Take 1 tablet by mouth Daily. (Patient not taking: Reported on 12/23/2024) 15 tablet 0    carvedilol (COREG) 6.25 MG tablet Take 1 tablet by mouth 2 (Two) Times a Day With Meals. (Patient not taking: Reported on 12/23/2024)      ELDERBERRY PO Take 1 tablet by mouth Daily. (Patient  "not taking: Reported on 12/23/2024)      ferrous gluconate (FERGON) 324 MG tablet Take 1 tablet by mouth Daily With Breakfast. (Patient not taking: Reported on 12/23/2024)      ferrous sulfate 324 (65 Fe) MG tablet delayed-release EC tablet Take 1 tablet by mouth Daily With Breakfast. (Patient not taking: Reported on 12/23/2024)      gabapentin (NEURONTIN) 300 MG capsule Take 1 capsule by mouth 3 (Three) Times a Day. (Patient not taking: Reported on 12/23/2024)      lisinopril (PRINIVIL,ZESTRIL) 10 MG tablet Take 1 tablet by mouth Daily. (Patient not taking: Reported on 6/4/2024)      loratadine (CLARITIN) 10 MG tablet Take 1 tablet by mouth Daily. (Patient not taking: Reported on 12/23/2024) 90 tablet 3    multivitamin with minerals (therapeutic multivitamin-minerals) tablet tablet Take 1 tablet by mouth Daily. (Patient not taking: Reported on 12/23/2024)      naloxone (NARCAN) 4 MG/0.1ML nasal spray into the nostril(s) as directed by provider. (Patient not taking: Reported on 12/23/2024)      zinc sulfate (ZINCATE) 220 (50 Zn) MG capsule Take 1 capsule by mouth Daily. (Patient not taking: Reported on 12/23/2024) 30 capsule 0     No facility-administered encounter medications on file as of 12/23/2024.       Allergies:  Allergies   Allergen Reactions    Levalbuterol Other (See Comments)       Immunizations:  Immunization History   Administered Date(s) Administered    ABRYSVO (RSV, 60+ or pregnant women 32-36 wks) 11/14/2023    COVID-19 (MODERNA) 1st,2nd,3rd Dose Monovalent 03/11/2021, 04/15/2021    Fluzone (or Fluarix & Flulaval for VFC) >6mos 11/03/2021    Fluzone High-Dose 65+yrs 11/14/2023    Influenza Seasonal Injectable 11/14/2023    Pneumococcal Conjugate 13-Valent (PCV13) 04/24/2019, 11/03/2021    Pneumococcal Conjugate 20-Valent (PCV20) 05/18/2022    Shingrix 03/29/2018, 08/10/2018    Td, Unspecified 08/27/2021    Tdap 02/13/2018       Objective:    Vitals:  /70   Pulse 83   Ht 171.5 cm (67.5\")   Wt " 85.7 kg (189 lb)   SpO2 97% Comment: 3 L  BMI 29.16 kg/m²     Physical Exam:  General: Patient is a 69 y.o. pleasant middle aged  male. Looks stated age. Appears to be in no acute distress.  Eyes: EOMI. PERRLA. Vision intact. No scleral icterus.  Ear, Nose, Mouth and Throat: Hearing is grossly intact. No Leukoplakia, pharyngitis, stomatitis or thrush. Swollen nasal mucosa with post nasal drop.  Neck: Range of motion of neck normal. No thyromegaly or masses. Mallampati Class 3  Respiratory: Clear to auscultation bilaterally. No use of accessory muscles. Decreased breath sounds.  Cardiovascular: Normal heart sounds. Regularly regular rhythm without murmur.  Gastrointestinal: Non tender, non distended, soft. Bowel sounds positive in all four quadrants. No organomegaly.  Skin: No obvious rashes, lesions, ulcers or large amount of bruising. No edema.   Neurological: No new motor deficits. Cranial nerves appear intact.  Psychiatric: Patient is alert and oriented to person, place and time.    Chest Imaging:      Study Result    Narrative & Impression   EXAMINATION: CT CHEST WO CONTRAST DIAGNOSTIC-      12/19/2024 10:07 AM     HISTORY: ILD; J44.9-Chronic obstructive pulmonary disease, unspecified;  J47.9-Bronchiectasis, uncomplicated; J84.9-Interstitial pulmonary  disease, unspecified     In order to have a CT radiation dose as low as reasonably achievable  Automated Exposure Control was utilized for adjustment of the mA and/or  KV according to patient size.     Total DLP = 582.72 mGy.cm     CT scan of the chest is performed without intravenous contrast  enhancement.     Images are acquired in axial plane with subsequent reconstruction in  coronal and sagittal planes.     Comparison is made with the previous study dated 11/14/2023.     An area of consolidation/scarring in the right upper middle and to a  lesser extent lower lobe are similar to the previous study.  3. Represent a chronic process and have not  significantly aren't visibly  changed since the previous study.     There is some linear interstitial change or scarring in the left lower  lobe.     There is persistent significant elevation of the right diaphragm similar  to the previous study with displacement of cardiomediastinal structures  into the left chest as in the previous study.     Limited visualized soft tissues of the neck are partly obscured by the  cervical fusion hardware. Limited visualized thyroid gland appears  unremarkable. No visible nodules.     No axillary lymphadenopathy.     Atheromatous change of thoracic aorta noted. No aneurysmal dilatation.  Left subclavian artery stent is seen in place. Patency is not evaluated  due to lack of intravenous contrast enhancement.     Central pulmonary arteries are of normal size and shape.     Limited visualized coronary arteries appears unremarkable with mild  atheromatous changes.     A nonspecific enlarged subcarinal lymph node is similar to the previous  study. No other enlarged mediastinal lymph nodes. Several calcified  granulomas are seen in the mediastinum and esvin bilaterally.     Limited visualized abdomen is unremarkable. There is fatty infiltration  of the liver.     Images reviewed in bone window show chronic degenerative changes of the  thoracic spine. Compression deformity of vertebra L1 is similar to the  previous study and represent a chronic process.     IMPRESSION:  1. A stable and unchanged, unenhanced, CT scan of the chest. No acute  abnormality noted.  2. Marked elevation of right diaphragm and resultant marked atelectatic  changes of the right lung and displacement of the cardiomediastinal  structures to the left chest. This is similar to the previous study. No  obstructive lesion noted. No finding to suggest chronic interstitial  pneumonia/fibrosis.           This report was signed and finalized on 12/19/2024 1:01 PM by Dr. Daniela Rivera MD.          Assessment:  1. Chronic  obstructive pulmonary disease, unspecified COPD type    2. Granulomatous lung disease    3. History of pneumonia    4. Hypoxemia requiring supplemental oxygen    5. ILD (interstitial lung disease)    6. Bronchiectasis without acute exacerbation    7. Black lung disease    8. MARIA ALEJANDRA (obstructive sleep apnea)    9. Former smoker    10. History of COVID-19    11. Non-seasonal allergic rhinitis, unspecified trigger        Plan/Recommendations:    1.  I reviewed the CT scan of the chest and explained the results to the patient.  Further follow-up CT scan will be done next year.  2.  He had stable COPD  and the symptoms are well-controlled with Advair and albuterol rescue inhaler.  We will continue the same medication for now and he did not need any refills at this time.  3.  Patient is requiring 2 L of oxygen all the time for his chronic hypoxemic respiratory failure which should be continued.  He sometimes needs 3 L oxygen at the time of activity.  He also uses oxygen at night.  He did not have any CPAP but may also have underlying sleep apnea.  4.  He has nasal allergy but he is not using the fluticasone nasal spray and loratadine which she can use as needed.  He did not take any vaccinations.  He will continue follow-up with the primary care provider and return to pulmonary clinic for a follow-up visit in 6 months time or earlier if needed.    Follow up:  6 Months    Time Spent:  30 minutes    I appreciate the opportunity of participating in this patient's care. I would like to thank the PCP for the referral.  Please feel free to contact me with any other questions.    Kathryn Anaya MD   Pulmonologist/Intensivist     Electronically signed by: Kathryn Anaya MD, 12/23/2024 15:11 CST

## 2024-12-27 ENCOUNTER — TELEPHONE (OUTPATIENT)
Dept: PULMONOLOGY | Facility: CLINIC | Age: 69
End: 2024-12-27

## 2024-12-27 NOTE — TELEPHONE ENCOUNTER
Caller: RACHEL ALAS    Relationship: Emergency Contact    Best call back number: [phone]     What is the best time to reach you: ANY    Who are you requesting to speak with (clinical staff, provider,  specific staff member):   JANNY LOWE    Do you know the name of the person who called: JANNY LOWE    What was the call regarding: UNKNOWN    Is it okay if the provider responds through MyChart:     DELETE AFTER REVIEWING: Send this encounter to the appropriate pool. See your Call Action Grid or Workflows for direction.

## 2025-07-03 ENCOUNTER — OFFICE VISIT (OUTPATIENT)
Dept: PULMONOLOGY | Facility: CLINIC | Age: 70
End: 2025-07-03
Payer: OTHER GOVERNMENT

## 2025-07-03 VITALS
OXYGEN SATURATION: 98 % | HEART RATE: 92 BPM | BODY MASS INDEX: 29.25 KG/M2 | DIASTOLIC BLOOD PRESSURE: 84 MMHG | WEIGHT: 193 LBS | SYSTOLIC BLOOD PRESSURE: 134 MMHG | HEIGHT: 68 IN

## 2025-07-03 DIAGNOSIS — Z87.891 FORMER SMOKER: ICD-10-CM

## 2025-07-03 DIAGNOSIS — J30.89 NON-SEASONAL ALLERGIC RHINITIS, UNSPECIFIED TRIGGER: ICD-10-CM

## 2025-07-03 DIAGNOSIS — G47.33 OSA (OBSTRUCTIVE SLEEP APNEA): ICD-10-CM

## 2025-07-03 DIAGNOSIS — J84.9 ILD (INTERSTITIAL LUNG DISEASE): ICD-10-CM

## 2025-07-03 DIAGNOSIS — J47.9 BRONCHIECTASIS WITHOUT ACUTE EXACERBATION: ICD-10-CM

## 2025-07-03 DIAGNOSIS — R09.02 HYPOXEMIA REQUIRING SUPPLEMENTAL OXYGEN: ICD-10-CM

## 2025-07-03 DIAGNOSIS — J44.9 CHRONIC OBSTRUCTIVE PULMONARY DISEASE, UNSPECIFIED COPD TYPE: Primary | ICD-10-CM

## 2025-07-03 DIAGNOSIS — Z99.81 HYPOXEMIA REQUIRING SUPPLEMENTAL OXYGEN: ICD-10-CM

## 2025-07-03 DIAGNOSIS — J60 BLACK LUNG DISEASE: ICD-10-CM

## 2025-07-03 DIAGNOSIS — Z86.16 HISTORY OF COVID-19: ICD-10-CM

## 2025-07-03 DIAGNOSIS — Z87.01 HISTORY OF PNEUMONIA: ICD-10-CM

## 2025-07-03 PROCEDURE — 99214 OFFICE O/P EST MOD 30 MIN: CPT | Performed by: INTERNAL MEDICINE

## 2025-07-03 RX ORDER — METOPROLOL SUCCINATE 25 MG/1
25 TABLET, EXTENDED RELEASE ORAL DAILY
COMMUNITY
Start: 2025-01-07

## 2025-07-03 NOTE — PROGRESS NOTES
RESPIRATORY DISEASE CLINIC OUTPATIENT PROGRESS NOTE    Patient: Deshaun Mena  : 1955  Age: 69 y.o.  Date of Service: July 3, 2025    REASON FOR CLINIC VISIT:  Chief Complaint   Patient presents with    COPD       Subjective:    History of Present Illness:  Deshaun Mena is a 69 y.o. male who presents to the office today to be seen for    Diagnosis Plan   1. Chronic obstructive pulmonary disease, unspecified COPD type        2. Bronchiectasis without acute exacerbation        3. Black lung disease        4. MARIA ALEJANDRA (obstructive sleep apnea)        5. Former smoker        6. History of COVID-19        7. History of pneumonia        8. Hypoxemia requiring supplemental oxygen        9. ILD (interstitial lung disease)        10. Non-seasonal allergic rhinitis, unspecified trigger        .  Other problems per record.    History:    Patient is a very pleasant middle-aged  gentleman was seen in the pulmonary clinic for follow-up visit.  Today the clinic with his wife.    Patient is a former smoker and did quit smoking few years ago.  He has history of moderate chronic obstructive pulmonary disease but the last PFT was done in  through December.  He is currently using Advair and Spiriva Respimat with albuterol nebulizer and rescue inhaler questionable short of breath.  He is Advair and Spiriva was not replaced with Breztri and a sample was given from the office.  He is still short of breath on minimal exertion but no significant shortness of breath at rest.  He is on 3 L of oxygen chronically he also has obstructive sleep apnea but unable to tolerate the CPAP.  He lives at home with his wife.  He is a  and gets his medications from the [facility].  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    He had his last CT scan of the chest done in 2024 which showed elevated hemidiaphragm low lung volumes atelectasis and some chronic  changes and a follow-up CT scan is ordered in 6 months time.  The patient also will have another PFT in 6 months.  He is apparently up-to-date on his vaccinations and did not have any acute complaints and did not need any new medication refills.  He had no recent hospital admissions any ER visits any urgent care facility or any other respiratory issues.      PFT done today:  Not done today    PFT Values          2023    14:00   Pre Drug PFT Results   FVC 66   FEV1 61   FEF 25-75% 51   FEV1/FVC 71   Other Tests PFT Results   TLC 79      DLCO 71   D/VAsb 96     Results for orders placed in visit on 23    Pulmonary Function Test    Narrative  Pulmonary Function Test    Performed by: Cathi Guerra, RRT  Authorized by: Kathryn Anaya MD  Pre Drug % Predicted  FVC: 66%  FEV1: 61%  FEF 25-75%: 51%  FEV1/FVC: 71%  T%  RV: 103%  DLCO: 71%  D/VAsb: 96%    Interpretation  Overall comments:  Above test results are acceptable and reports will bite his criteria  Balance of the above test results the patient showed evidence of moderate obstructive and mild to moderate restrictive dysfunction.  No bronchodilator challenge was done.  The patient shows no FEV1 FEV V1/FVC ratio and FEF 25 to 75% suggesting obstructive dysfunction into the lung consistent decreased suggesting restrictive dysfunction.  Diffusion capacity corrected for alveolar volume is within normal limits.    Patient had pulmonary function test done in the past but it was done in the [facility] or outside facility and is not available for comparison.  Clinical correlation is indicated.    Kathryn Anaya MD  Pulmonologist/Intensivist  2023 17:27 CST         Bronchodilator therapy: Advair and Spiriva Respimat and Albuterol nebulizer and rescue inhaler    Smoking Status:   Social History     Tobacco Use   Smoking Status Former    Current packs/day: 0.00    Average packs/day: 1 pack/day for 15.0 years (15.0 ttl pk-yrs)    Types:  Cigarettes    Start date: 1978    Quit date: 1993    Years since quittin.5    Passive exposure: Past   Smokeless Tobacco Never     Pulm Rehab: no  Sleep: yes    Support System: lives with their spouse    Code Status:   There are no questions and answers to display.        Review of Systems:  A complete review of systems is performed and all other systems were reviewed and negative as note above in the HPI.  Review of Systems   Constitutional:  Positive for fatigue. Negative for unexpected weight gain and unexpected weight loss.   HENT:  Positive for congestion, postnasal drip and sinus pressure.    Eyes: Negative.    Respiratory:  Positive for cough, chest tightness and shortness of breath.    Cardiovascular: Negative.    Gastrointestinal: Negative.    Endocrine: Negative.    Genitourinary: Negative.    Musculoskeletal:  Positive for arthralgias and back pain.   Skin: Negative.    Allergic/Immunologic: Positive for environmental allergies.   Neurological: Negative.    Hematological: Negative.    Psychiatric/Behavioral: Negative.         CAT/ACT Score:  Not done today    Medications:  Outpatient Encounter Medications as of 7/3/2025   Medication Sig Dispense Refill    albuterol (PROVENTIL) (2.5 MG/3ML) 0.083% nebulizer solution Take 2.5 mg by nebulization Every 4 (Four) Hours As Needed for Wheezing. 360 mL 5    albuterol sulfate  (90 Base) MCG/ACT inhaler Inhale 2 puffs Every 4 (Four) Hours As Needed for Wheezing. 6.7 g 5    allopurinol (ZYLOPRIM) 100 MG tablet Take 1 tablet by mouth Daily.      amoxicillin-clavulanate (AUGMENTIN) 875-125 MG per tablet Take 1 tablet by mouth 2 (Two) Times a Day.      ascorbic acid (VITAMIN C) 500 MG tablet Take 1 tablet by mouth Daily. 15 tablet 0    atorvastatin (LIPITOR) 80 MG tablet Take 1 tablet by mouth Every Night.      carvedilol (COREG) 6.25 MG tablet Take 1 tablet by mouth 2 (Two) Times a Day With Meals.      cholecalciferol (VITAMIN D3) 25 MCG (1000 UT)  tablet Take 2 tablets by mouth Daily. 30 tablet 0    cyclobenzaprine (FLEXERIL) 5 MG tablet Take 1 tablet by mouth 2 (Two) Times a Day As Needed. (Patient taking differently: Take 1 tablet by mouth As Needed.)      ELDERBERRY PO Take 1 tablet by mouth Daily.      ferrous gluconate (FERGON) 324 MG tablet Take 1 tablet by mouth Daily With Breakfast.      ferrous sulfate 324 (65 Fe) MG tablet delayed-release EC tablet Take 1 tablet by mouth Daily With Breakfast.      fluticasone (Flonase Allergy Relief) 50 MCG/ACT nasal spray 2 sprays into the nostril(s) as directed by provider Daily. (Patient taking differently: Administer 2 sprays into the nostril(s) as directed by provider As Needed.) 16 g 11    Fluticasone-Salmeterol (ADVAIR/WIXELA) 250-50 MCG/ACT DISKUS Inhale 1 puff 2 (Two) Times a Day.      furosemide (LASIX) 40 MG tablet Take 1 tablet by mouth Daily.      gabapentin (NEURONTIN) 300 MG capsule Take 1 capsule by mouth 3 (Three) Times a Day.      HYDROcodone-acetaminophen (NORCO)  MG per tablet Take 1 tablet by mouth 2 (Two) Times a Day.      lisinopril (PRINIVIL,ZESTRIL) 10 MG tablet Take 1 tablet by mouth Daily.      loratadine (CLARITIN) 10 MG tablet Take 1 tablet by mouth Daily. 90 tablet 3    magnesium oxide (MAG-OX) 400 MG tablet Take 1 tablet by mouth Daily.      Melatonin 10 MG capsule Take 2 capsules by mouth Daily.      metoprolol succinate XL (TOPROL-XL) 25 MG 24 hr tablet Take 1 tablet by mouth Daily.      metoprolol tartrate (LOPRESSOR) 25 MG tablet Take 1 tablet by mouth Daily. Started medication 12/22/24 -trail run to see if it works for dizziness.      Mucinex 600 MG 12 hr tablet Take 2 tablets by mouth. (Patient taking differently: Take 2 tablets by mouth As Needed.)      multivitamin with minerals (therapeutic multivitamin-minerals) tablet tablet Take 1 tablet by mouth Daily.      naloxone (NARCAN) 4 MG/0.1ML nasal spray Administer  into the nostril(s) as directed by provider.      O2  "(OXYGEN) Inhale 3 L/min Continuous.      omeprazole (priLOSEC) 20 MG capsule Take 1 capsule by mouth Every Night.      rivaroxaban (XARELTO) 20 MG tablet Take 1 tablet by mouth Every Night.      spironolactone (ALDACTONE) 25 MG tablet Take 1 tablet by mouth Daily.      tamsulosin (FLOMAX) 0.4 MG capsule 24 hr capsule Take 1 capsule by mouth Daily.      tiotropium bromide monohydrate (SPIRIVA RESPIMAT) 2.5 MCG/ACT aerosol solution inhaler Inhale 2 puffs Daily.      zinc sulfate (ZINCATE) 220 (50 Zn) MG capsule Take 1 capsule by mouth Daily. 30 capsule 0    zolpidem (AMBIEN) 10 MG tablet Take 1 tablet by mouth At Night As Needed for Sleep.       No facility-administered encounter medications on file as of 7/3/2025.       Allergies:  Allergies   Allergen Reactions    Levalbuterol Other (See Comments)       Immunizations:  Immunization History   Administered Date(s) Administered    ABRYSVO (RSV, 60+ or pregnant women 32-36 wks) 11/14/2023    COVID-19 (MODERNA) 1st,2nd,3rd Dose Monovalent 03/11/2021, 04/15/2021    Fluzone (or Fluarix & Flulaval for VFC) >6mos 11/03/2021    Fluzone High-Dose 65+yrs 11/14/2023    Influenza Seasonal Injectable 11/14/2023    Pneumococcal Conjugate 13-Valent (PCV13) 04/24/2019, 11/03/2021    Pneumococcal Conjugate 20-Valent (PCV20) 05/18/2022    Shingrix 03/29/2018, 08/10/2018    Td, Unspecified 08/27/2021    Tdap 02/13/2018       Objective:    Vitals:  /84   Pulse 92   Ht 171.5 cm (67.5\")   Wt 87.5 kg (193 lb)   SpO2 98% Comment: 3 LT O2  BMI 29.78 kg/m²     Physical Exam:  General: Patient is a 69 y.o. pleasant middle aged  male. Looks stated age. Appears to be in no acute distress.  Eyes: EOMI. PERRLA. Vision intact. No scleral icterus.  Ear, Nose, Mouth and Throat: Hearing is grossly intact. No Leukoplakia, pharyngitis, stomatitis or thrush. Swollen nasal mucosa with post nasal drop.  Neck: Range of motion of neck normal. No thyromegaly or masses. Mallampati Class " 3  Respiratory: Clear to auscultation bilaterally. No use of accessory muscles. Decreased breath sounds.  Cardiovascular: Normal heart sounds. Regularly regular rhythm without murmur.  Gastrointestinal: Non tender, non distended, soft. Bowel sounds positive in all four quadrants. No organomegaly.  Skin: No obvious rashes, lesions, ulcers or large amount of bruising. No edema.   Neurological: No new motor deficits. Cranial nerves appear intact.  Psychiatric: Patient is alert and oriented to person, place and time.    Chest Imaging:      Study Result    Narrative & Impression   EXAMINATION: CT CHEST WO CONTRAST DIAGNOSTIC-      12/19/2024 10:07 AM     HISTORY: ILD; J44.9-Chronic obstructive pulmonary disease, unspecified;  J47.9-Bronchiectasis, uncomplicated; J84.9-Interstitial pulmonary  disease, unspecified     In order to have a CT radiation dose as low as reasonably achievable  Automated Exposure Control was utilized for adjustment of the mA and/or  KV according to patient size.     Total DLP = 582.72 mGy.cm     CT scan of the chest is performed without intravenous contrast  enhancement.     Images are acquired in axial plane with subsequent reconstruction in  coronal and sagittal planes.     Comparison is made with the previous study dated 11/14/2023.     An area of consolidation/scarring in the right upper middle and to a  lesser extent lower lobe are similar to the previous study.  3. Represent a chronic process and have not significantly aren't visibly  changed since the previous study.     There is some linear interstitial change or scarring in the left lower  lobe.     There is persistent significant elevation of the right diaphragm similar  to the previous study with displacement of cardiomediastinal structures  into the left chest as in the previous study.     Limited visualized soft tissues of the neck are partly obscured by the  cervical fusion hardware. Limited visualized thyroid gland  appears  unremarkable. No visible nodules.     No axillary lymphadenopathy.     Atheromatous change of thoracic aorta noted. No aneurysmal dilatation.  Left subclavian artery stent is seen in place. Patency is not evaluated  due to lack of intravenous contrast enhancement.     Central pulmonary arteries are of normal size and shape.     Limited visualized coronary arteries appears unremarkable with mild  atheromatous changes.     A nonspecific enlarged subcarinal lymph node is similar to the previous  study. No other enlarged mediastinal lymph nodes. Several calcified  granulomas are seen in the mediastinum and esvin bilaterally.     Limited visualized abdomen is unremarkable. There is fatty infiltration  of the liver.     Images reviewed in bone window show chronic degenerative changes of the  thoracic spine. Compression deformity of vertebra L1 is similar to the  previous study and represent a chronic process.     IMPRESSION:  1. A stable and unchanged, unenhanced, CT scan of the chest. No acute  abnormality noted.  2. Marked elevation of right diaphragm and resultant marked atelectatic  changes of the right lung and displacement of the cardiomediastinal  structures to the left chest. This is similar to the previous study. No  obstructive lesion noted. No finding to suggest chronic interstitial  pneumonia/fibrosis     This report was signed and finalized on 12/19/2024 1:01 PM by Dr. Daniela Rivera MD.         Assessment:  1. Chronic obstructive pulmonary disease, unspecified COPD type    2. Bronchiectasis without acute exacerbation    3. Black lung disease    4. MARIA ALEJANDRA (obstructive sleep apnea)    5. Former smoker    6. History of COVID-19    7. History of pneumonia    8. Hypoxemia requiring supplemental oxygen    9. ILD (interstitial lung disease)    10. Non-seasonal allergic rhinitis, unspecified trigger        Plan/Recommendations:    I reviewed the last CT scan of the chest and explained the results to the  patient.  He had chronic changes and elevated right hemidiaphragm noted.  He also had chronic changes in the lung noted and he had history of COPD and left lung.  He will have a follow-up CT scan of the chest done in 6 months time.  For his underlying COPD and bronchiectasis he will continue using the albuterol nebulizer and rescue inhaler.  As he is getting short of breath even after using Advair and Spiriva I replaced it with the Breztri.  If it works we will continue on the Breztri.  He is also on home oxygen 3 L all the time and is unable to tolerate the CPAP for his sleep apnea.  He will continue using fluticasone nasal spray which he is not using routinely and I told him to use the fluticasone and also advised him to continue loratadine for nasal allergy routinely rather than using on and off.  He did not need any refill of any medications.  He has a new primary care provider in the [facility] with whom he will continue follow-up and I advised him to get his annual vaccinations through her primary care provider.  He will be getting into the pulmonary clinic for follow-up visit in 6 months time or earlier if needed.    Follow up:  6 Months    Time Spent:  30 minutes    I appreciate the opportunity of participating in this patient's care. I would like to thank the PCP for the referral.  Please feel free to contact me with any other questions.    Kathryn Anaya MD   Pulmonologist/Intensivist     Electronically signed by: Kathryn Anaya MD, 7/3/2025 11:37 CDT

## 2025-07-09 ENCOUNTER — TELEPHONE (OUTPATIENT)
Dept: PULMONOLOGY | Facility: CLINIC | Age: 70
End: 2025-07-09
Payer: OTHER GOVERNMENT

## 2025-07-09 NOTE — TELEPHONE ENCOUNTER
[facility] received a fax that requires your attention. The document has been indexed to the patient’s chart for your review.      Reason for sending: MEDICAL RECORDS REQUEST    Name of Sender:   [facility] - [id number] [place] [address]  [phone]  PHONE [phone]    Date Indexed: 07-    Notes (if needed): INDEXED UNDER PATIENT CORRESPONDENCE.

## 2025-07-21 ENCOUNTER — TELEPHONE (OUTPATIENT)
Dept: PULMONOLOGY | Facility: CLINIC | Age: 70
End: 2025-07-21
Payer: OTHER GOVERNMENT

## 2025-07-21 NOTE — TELEPHONE ENCOUNTER
Please send to [facility]  Requested Prescriptions     Signed Prescriptions Disp Refills    Budeson-Glycopyrrol-Formoterol (BREZTRI) 160-9-4.8 MCG/ACT aerosol inhaler 1 each 5     Sig: Inhale 2 puffs 2 (Two) Times a Day.     Authorizing Provider: THOMAS CAMARILLO     Ordering User: JANNY CASTREJON      Last office visit with prescribing clinician: 7/3/2025   Last telemedicine visit with prescribing clinician: Visit date not found   Next office visit with prescribing clinician: 1/16/2026                         Would you like a call back once the refill request has been completed: [] Yes [] No    If the office needs to give you a call back, can they leave a voicemail: [] Yes [] No    Janny Castrejon MA  07/21/25, 14:59 CDT

## 2025-07-24 ENCOUNTER — TELEPHONE (OUTPATIENT)
Dept: PULMONOLOGY | Facility: CLINIC | Age: 70
End: 2025-07-24
Payer: OTHER GOVERNMENT

## 2025-07-30 ENCOUNTER — TELEPHONE (OUTPATIENT)
Dept: PULMONOLOGY | Facility: CLINIC | Age: 70
End: 2025-07-30
Payer: OTHER GOVERNMENT

## (undated) DEVICE — TOTAL TRAY, 16FR 10ML SIL FOLEY, URN: Brand: MEDLINE

## (undated) DEVICE — CATHETER KIT 5 FR 21 GAX7 CM MICROINTRODUCER GUIDEWIRE STIFF

## (undated) DEVICE — SUTURE NONABSORBABLE MONOFILAMENT 4-0 PS-2 18 IN BLK ETHILON 1667G

## (undated) DEVICE — 3M™ STERI-DRAPE™ INSTRUMENT POUCH 1018: Brand: STERI-DRAPE™

## (undated) DEVICE — TUBE ET 8MM NSL ORAL BASIC CUF INTMED MURPHY EYE RADPQ MRK

## (undated) DEVICE — CANNULA PERF L1.3IN TIP L2MM S STL POLYUR TB ARTOTMY BLB

## (undated) DEVICE — FLEXOR, CHECK-FLO, INTRODUCER RAABE MODIFICATION: Brand: FLEXOR

## (undated) DEVICE — GLOVE SURG SZ 75 L12IN FNGR THK79MIL GRN LTX FREE

## (undated) DEVICE — GLOVE SURG SZ 7 CRM LTX FREE POLYISOPRENE POLYMER BEAD ANTI

## (undated) DEVICE — GLV SURG SENSICARE POLYISPRN W/ALOE PF LF 6.5 GRN STRL

## (undated) DEVICE — SOL IRR H2O BTL 1000ML STRL

## (undated) DEVICE — TOWEL,OR,DSP,ST,BLUE,DLX,4/PK,20PK/CS: Brand: MEDLINE

## (undated) DEVICE — GOWN,PREVENTION PLUS,XL,ST,24/CS: Brand: MEDLINE

## (undated) DEVICE — SYSTEM BX CAP BILI RAP EXCHG CAP LOK DEV COMPATIBLE W/ OLY

## (undated) DEVICE — SUTURE NONABSORBABLE MONOFILAMENT 7-0 BV-1 1X24 IN PROLENE 8702H

## (undated) DEVICE — GLV SURG SENSICARE MICRO PF LF 7.5 STRL

## (undated) DEVICE — ENDO KIT,LOURDES HOSPITAL: Brand: MEDLINE INDUSTRIES, INC.

## (undated) DEVICE — STRIP,CLOSURE,WOUND,MEDI-STRIP,1/2X4: Brand: MEDLINE

## (undated) DEVICE — BAG SPEC REM 224ML W4XL6IN DIA10MM 1 HND GYN DISP ENDOPCH

## (undated) DEVICE — DRESSING FOAM SELF ADH 20X10 CM ABSORBENT MEPILEX BORDER

## (undated) DEVICE — ANESTHESIA CIRCUIT ADULT-LF: Brand: MEDLINE INDUSTRIES, INC.

## (undated) DEVICE — MAX-A-L MAX ADULT LONG PROBE: Brand: MEDLINE

## (undated) DEVICE — SOLUTION IV IRRIG POUR BRL 0.9% SODIUM CHL 2F7124

## (undated) DEVICE — FOGARTY EMBOLECTOMY CATHETER: Brand: FOGARTY

## (undated) DEVICE — TUBE ET 7.5MM NSL ORAL BASIC CUF INTMED MURPHY EYE RADPQ

## (undated) DEVICE — Device

## (undated) DEVICE — GARMENT,MEDLINE,DVT,INT,CALF,MED, GEN2: Brand: MEDLINE

## (undated) DEVICE — Z DISCONTINUED USE 2272117 DRAPE SURG 3 QTR N INVASIVE 2 LAYR DISP

## (undated) DEVICE — RADIFOCUS GLIDECATH: Brand: GLIDECATH

## (undated) DEVICE — SOLUTION IV 100ML 0.9% SOD CHL PLAS CONT USP VIAFLX 1 PER

## (undated) DEVICE — DRESSING TRNSPAR W5XL4.5IN FLM SHT SEMIPERMEABLE WIND

## (undated) DEVICE — GLOVE ORTHO 8   MSG9480

## (undated) DEVICE — 3F 80CM OVER-THE-WIRE EMBOLECTOMY CATHETER, EIFU: Brand: LEMAITRE EMBOLECTOMY CATHETER

## (undated) DEVICE — PROVE COVER: Brand: UNBRANDED

## (undated) DEVICE — STERILE POLYISOPRENE POWDER-FREE SURGICAL GLOVES WITH EMOLLIENT COATING: Brand: PROTEXIS

## (undated) DEVICE — DRAPE KEYBOARD W26XL36IN ADH

## (undated) DEVICE — PTA BALLOON DILATATION CATHETER: Brand: CHARGER™

## (undated) DEVICE — LARYNGOSCOPE VID MILLER 2 MTL BLADE M HNDL CURAPLEX

## (undated) DEVICE — FG-44NR-1 ROTATABLE RAT TOOTH GRASP FORC: Brand: ROTATABLE GRASPING FORCEPS

## (undated) DEVICE — SUTURE PROL SZ 6-0 L24IN NONABSORBABLE BLU L9.3MM BV-1 3/8 8805H

## (undated) DEVICE — SOLUTION IV 500ML 0.9% SOD CHL PH 5 INJ USP VIAFLX PLAS

## (undated) DEVICE — RETRIEVAL BALLOON CATHETER: Brand: EXTRACTOR™ PRO RX

## (undated) DEVICE — SUTURE NONABSORBABLE MONOFILAMENT 6-0 BV-1 1X30 IN PROLENE 8709H

## (undated) DEVICE — SUTURE SZ 0 27IN 5/8 CIR UR-6  TAPER PT VIOLET ABSRB VICRYL J603H

## (undated) DEVICE — GLOVE SURG SZ 65 L12IN FNGR THK79MIL GRN LTX FREE

## (undated) DEVICE — SUTURE PROL SZ 7-0 L24IN NONABSORBABLE BLU L9.3MM BV-1 3/8 M8702

## (undated) DEVICE — CLIP LIG M BLU TI HRT SHP WIRE HORZ 180 PER BX

## (undated) DEVICE — FOGARTY ARTERIAL EMBOLECTOMY CATHETER 2F 60CM: Brand: FOGARTY

## (undated) DEVICE — SUTURE PERMAHAND SZ 4-0 L12X30IN NONABSORBABLE BLK SILK A303H

## (undated) DEVICE — SURGICAL PROCEDURE PACK VASC LOURDES HOSP

## (undated) DEVICE — CONTRAST IOTHALAMATE MEGLUMINE 60% 50 ML INJ CONRAY 60

## (undated) DEVICE — GUIDEWIRE VASC L260CM DIA0.035IN L10CM DIA3MM J TIP PTFE S

## (undated) DEVICE — GLIDESHEATH BASIC HYDROPHILIC COATED INTRODUCER SHEATH: Brand: GLIDESHEATH

## (undated) DEVICE — SOLUTION ANTIFOG VIS SYS CLEARIFY LAPSCP

## (undated) DEVICE — APPLIER CLP M L L11.4IN DIA10MM ENDOSCP ROT MULT FOR LIG

## (undated) DEVICE — PUMP SUC IRR TBNG L10FT W/ HNDPC ASSEMB STRYKEFLOW 2

## (undated) DEVICE — CLIP INT SM WIDE RED TI TRNSVRS GRV CHEVRON SHP W/ PRECIS

## (undated) DEVICE — SINGLE USE DISTAL COVER MAJ-2315: Brand: SINGLE USE DISTAL COVER

## (undated) DEVICE — INFLATION DEVICE: Brand: ENCORE™ 26

## (undated) DEVICE — SUTURE MNCRYL 0 UNDYED CT1 Y496H

## (undated) DEVICE — SUTURE VCRL SZ 3-0 L27IN ABSRB UD L26MM SH 1/2 CIR J416H

## (undated) DEVICE — 150 ML FASTURN SYRINGE WITH QUICK FILL TUBE(SET,PKG,150ML FT,W/QFT,RAD,JAPAN,MC)(60729679): Brand: MEDRAD® MARK V PROVIS STERILE DISPOSABLE SYRINGE 150ML & QFT

## (undated) DEVICE — GOWN,AURORA,NOREINF,RAGLAN,XL,STERILE: Brand: MEDLINE

## (undated) DEVICE — MEDIA CONTRAST INJ VISIPAQUE 150ML 320MG

## (undated) DEVICE — SOLUTION INJ VISIPAQUE 50ML

## (undated) DEVICE — FOGARTY ARTERIAL EMBOLECTOMY CATHETER 3F 40CM: Brand: FOGARTY

## (undated) DEVICE — LO-CONTOUR ORAL/NASAL TRACHEAL TUBE CUFFED,MURPHY EYE: Brand: MALLINCKRODT

## (undated) DEVICE — CATHETER ANGIO 5FR L110CM GWIRE 0.035IN PGTL W/O SIDE H

## (undated) DEVICE — GLV SURG SENSICARE PI ORTHO SZ6.5 LF STRL

## (undated) DEVICE — LARYNGOSCOPE BLDE MAC HNDL M SZ 35 ST CURAPLEX CURAVIEW LED

## (undated) DEVICE — BLADE LARYNGOSCOPE MILLER 2

## (undated) DEVICE — Z DISCONTINUED USE 2429233 DRESSING FOAM W10XL10CM 5 LAYR SELF ADH VERSATILE SAFETAC

## (undated) DEVICE — GLOVE SURG SZ 7 L12IN FNGR THK79MIL GRN LTX FREE

## (undated) DEVICE — SENSOR OXMTR AD NSL ADH FRM FIT SGL PT USE NELLCOR

## (undated) DEVICE — LOOP VES W1.3MM THK0.9MM MINI WHT SIL FLD REPELLENT

## (undated) DEVICE — SUTURE MCRYL SZ 4-0 L18IN ABSRB UD L19MM PS-2 3/8 CIR PRIM Y496G

## (undated) DEVICE — MASK VENTILATOR MED AD SUPERNOVA ET

## (undated) DEVICE — GUIDEWIRE VASC L260CM DIA0.035IN L15CM STR TIP PTFE S STL

## (undated) DEVICE — GLOVE SURG SZ 75 CRM LTX FREE POLYISOPRENE POLYMER BEAD ANTI

## (undated) DEVICE — C-ARM: Brand: UNBRANDED

## (undated) DEVICE — 4F 80CM OVER-THE-WIRE EMBOLECTOMY CATHETER, EIFU: Brand: LEMAITRE EMBOLECTOMY CATHETER

## (undated) DEVICE — TROCAR: Brand: KII® SLEEVE

## (undated) DEVICE — PINNACLE INTRODUCER SHEATH: Brand: PINNACLE

## (undated) DEVICE — GUIDEWIRE WITH ICE™ HYDROPHILIC COATING: Brand: V-18™ CONTROL WIRE™

## (undated) DEVICE — TROCAR: Brand: KII FIOS FIRST ENTRY

## (undated) DEVICE — SPHINCTEROTOME: Brand: DREAMTOME™ RX 44

## (undated) DEVICE — DRESSING HEMSTAT W1X2IN ABSRB SURGCEL SNOW

## (undated) DEVICE — TUBING ANGIO L72IN 900PSI CLR PVC M TO FEM AIRLESS ROT ADPT

## (undated) DEVICE — RADIFOCUS GLIDEWIRE ADVANTAGE GUIDEWIRE: Brand: GLIDEWIRE ADVANTAGE

## (undated) DEVICE — LIQUIBAND RAPID ADHESIVE 36/CS 0.8ML: Brand: MEDLINE

## (undated) DEVICE — SUTURE PERMAHAND SZ 3-0 L30IN NONABSORBABLE BLK SILK BRAID A304H

## (undated) DEVICE — GLOVE SURG SZ 75 L12IN FNGR THK94MIL TRNSLUC YEL LTX

## (undated) DEVICE — 5F PLUS 40CM OVER-THE-WIRE EMBOLECTOMY CATHETER, EIFU: Brand: LEMAITRE EMBOLECTOMY CATHETER